# Patient Record
Sex: FEMALE | Race: OTHER | NOT HISPANIC OR LATINO | URBAN - METROPOLITAN AREA
[De-identification: names, ages, dates, MRNs, and addresses within clinical notes are randomized per-mention and may not be internally consistent; named-entity substitution may affect disease eponyms.]

---

## 2018-01-01 ENCOUNTER — OUTPATIENT (OUTPATIENT)
Dept: OUTPATIENT SERVICES | Facility: HOSPITAL | Age: 83
LOS: 1 days | End: 2018-01-01
Payer: MEDICAID

## 2018-01-01 PROCEDURE — G9001: CPT

## 2018-01-02 ENCOUNTER — INPATIENT (INPATIENT)
Facility: HOSPITAL | Age: 83
LOS: 6 days | Discharge: HOME | DRG: 202 | End: 2018-01-09
Attending: GENERAL PRACTICE | Admitting: GENERAL PRACTICE
Payer: MEDICARE

## 2018-01-02 VITALS
HEART RATE: 71 BPM | HEIGHT: 62 IN | OXYGEN SATURATION: 97 % | DIASTOLIC BLOOD PRESSURE: 77 MMHG | TEMPERATURE: 98 F | SYSTOLIC BLOOD PRESSURE: 133 MMHG | WEIGHT: 149.91 LBS | RESPIRATION RATE: 17 BRPM

## 2018-01-02 LAB
ALBUMIN SERPL ELPH-MCNC: 3.8 G/DL — SIGNIFICANT CHANGE UP (ref 3.5–5)
ALP SERPL-CCNC: 93 U/L — SIGNIFICANT CHANGE UP (ref 40–120)
ALT FLD-CCNC: 21 U/L DA — SIGNIFICANT CHANGE UP (ref 10–60)
ANION GAP SERPL CALC-SCNC: 12 MMOL/L — SIGNIFICANT CHANGE UP (ref 5–17)
APPEARANCE UR: CLEAR — SIGNIFICANT CHANGE UP
AST SERPL-CCNC: 19 U/L — SIGNIFICANT CHANGE UP (ref 10–40)
BACTERIA # UR AUTO: ABNORMAL /HPF
BASOPHILS # BLD AUTO: 0.1 K/UL — SIGNIFICANT CHANGE UP (ref 0–0.2)
BASOPHILS NFR BLD AUTO: 1 % — SIGNIFICANT CHANGE UP (ref 0–2)
BILIRUB SERPL-MCNC: 0.3 MG/DL — SIGNIFICANT CHANGE UP (ref 0.2–1.2)
BILIRUB UR-MCNC: NEGATIVE — SIGNIFICANT CHANGE UP
BUN SERPL-MCNC: 18 MG/DL — SIGNIFICANT CHANGE UP (ref 7–18)
CALCIUM SERPL-MCNC: 9.3 MG/DL — SIGNIFICANT CHANGE UP (ref 8.4–10.5)
CHLORIDE SERPL-SCNC: 100 MMOL/L — SIGNIFICANT CHANGE UP (ref 96–108)
CO2 SERPL-SCNC: 25 MMOL/L — SIGNIFICANT CHANGE UP (ref 22–31)
COLOR SPEC: SIGNIFICANT CHANGE UP
CREAT SERPL-MCNC: 0.82 MG/DL — SIGNIFICANT CHANGE UP (ref 0.5–1.3)
DIFF PNL FLD: ABNORMAL
EOSINOPHIL # BLD AUTO: 0.2 K/UL — SIGNIFICANT CHANGE UP (ref 0–0.5)
EOSINOPHIL NFR BLD AUTO: 3.3 % — SIGNIFICANT CHANGE UP (ref 0–6)
EPI CELLS # UR: ABNORMAL /HPF
GLUCOSE SERPL-MCNC: 127 MG/DL — HIGH (ref 70–99)
GLUCOSE UR QL: NEGATIVE — SIGNIFICANT CHANGE UP
HCT VFR BLD CALC: 41.3 % — SIGNIFICANT CHANGE UP (ref 34.5–45)
HGB BLD-MCNC: 12.8 G/DL — SIGNIFICANT CHANGE UP (ref 11.5–15.5)
KETONES UR-MCNC: NEGATIVE — SIGNIFICANT CHANGE UP
LACTATE SERPL-SCNC: 1.8 MMOL/L — SIGNIFICANT CHANGE UP (ref 0.7–2)
LEUKOCYTE ESTERASE UR-ACNC: ABNORMAL
LYMPHOCYTES # BLD AUTO: 2.9 K/UL — SIGNIFICANT CHANGE UP (ref 1–3.3)
LYMPHOCYTES # BLD AUTO: 40.1 % — SIGNIFICANT CHANGE UP (ref 13–44)
MCHC RBC-ENTMCNC: 26.1 PG — LOW (ref 27–34)
MCHC RBC-ENTMCNC: 31 GM/DL — LOW (ref 32–36)
MCV RBC AUTO: 84.2 FL — SIGNIFICANT CHANGE UP (ref 80–100)
MONOCYTES # BLD AUTO: 0.4 K/UL — SIGNIFICANT CHANGE UP (ref 0–0.9)
MONOCYTES NFR BLD AUTO: 5.7 % — SIGNIFICANT CHANGE UP (ref 2–14)
NEUTROPHILS # BLD AUTO: 3.6 K/UL — SIGNIFICANT CHANGE UP (ref 1.8–7.4)
NEUTROPHILS NFR BLD AUTO: 49.9 % — SIGNIFICANT CHANGE UP (ref 43–77)
NITRITE UR-MCNC: NEGATIVE — SIGNIFICANT CHANGE UP
PH UR: 7 — SIGNIFICANT CHANGE UP (ref 5–8)
PLATELET # BLD AUTO: 236 K/UL — SIGNIFICANT CHANGE UP (ref 150–400)
POTASSIUM SERPL-MCNC: 3.6 MMOL/L — SIGNIFICANT CHANGE UP (ref 3.5–5.3)
POTASSIUM SERPL-SCNC: 3.6 MMOL/L — SIGNIFICANT CHANGE UP (ref 3.5–5.3)
PROT SERPL-MCNC: 8.6 G/DL — HIGH (ref 6–8.3)
PROT UR-MCNC: NEGATIVE — SIGNIFICANT CHANGE UP
RBC # BLD: 4.91 M/UL — SIGNIFICANT CHANGE UP (ref 3.8–5.2)
RBC # FLD: 13.7 % — SIGNIFICANT CHANGE UP (ref 10.3–14.5)
RBC CASTS # UR COMP ASSIST: SIGNIFICANT CHANGE UP /HPF (ref 0–2)
SODIUM SERPL-SCNC: 137 MMOL/L — SIGNIFICANT CHANGE UP (ref 135–145)
SP GR SPEC: 1 — LOW (ref 1.01–1.02)
UROBILINOGEN FLD QL: NEGATIVE — SIGNIFICANT CHANGE UP
WBC # BLD: 7.1 K/UL — SIGNIFICANT CHANGE UP (ref 3.8–10.5)
WBC # FLD AUTO: 7.1 K/UL — SIGNIFICANT CHANGE UP (ref 3.8–10.5)
WBC UR QL: >50 /HPF (ref 0–5)

## 2018-01-02 PROCEDURE — 71045 X-RAY EXAM CHEST 1 VIEW: CPT | Mod: 26

## 2018-01-02 RX ORDER — SODIUM CHLORIDE 9 MG/ML
1000 INJECTION INTRAMUSCULAR; INTRAVENOUS; SUBCUTANEOUS ONCE
Qty: 0 | Refills: 0 | Status: COMPLETED | OUTPATIENT
Start: 2018-01-02 | End: 2018-01-02

## 2018-01-02 RX ORDER — ALBUTEROL 90 UG/1
2.5 AEROSOL, METERED ORAL ONCE
Qty: 0 | Refills: 0 | Status: COMPLETED | OUTPATIENT
Start: 2018-01-02 | End: 2018-01-02

## 2018-01-02 RX ORDER — IPRATROPIUM/ALBUTEROL SULFATE 18-103MCG
3 AEROSOL WITH ADAPTER (GRAM) INHALATION ONCE
Qty: 0 | Refills: 0 | Status: COMPLETED | OUTPATIENT
Start: 2018-01-02 | End: 2018-01-02

## 2018-01-02 RX ADMIN — ALBUTEROL 2.5 MILLIGRAM(S): 90 AEROSOL, METERED ORAL at 23:27

## 2018-01-02 RX ADMIN — SODIUM CHLORIDE 1000 MILLILITER(S): 9 INJECTION INTRAMUSCULAR; INTRAVENOUS; SUBCUTANEOUS at 22:10

## 2018-01-02 RX ADMIN — Medication 3 MILLILITER(S): at 23:27

## 2018-01-02 RX ADMIN — Medication 125 MILLIGRAM(S): at 22:10

## 2018-01-02 NOTE — ED PROVIDER NOTE - MEDICAL DECISION MAKING DETAILS
pt with cough and wheezing and feels dizzy / no focal neurological deficits   labs , cxr , hydration

## 2018-01-02 NOTE — ED PROVIDER NOTE - CARE PLAN
Principal Discharge DX:	Moderate persistent asthma with exacerbation Principal Discharge DX:	Moderate persistent asthma with exacerbation  Secondary Diagnosis:	NSTEMI (non-ST elevated myocardial infarction)

## 2018-01-02 NOTE — ED PROVIDER NOTE - PROGRESS NOTE DETAILS
patient signed out to me by Dr. Hutchinson at 10pm. labs show trop 0.37-given ASA, EKG nonischemic, proBNP 10K, CXR shows mild interstitial markings at bases. On reeval pt denies chest pain, wheezing resolved after nebs. Patient stable for admission. OTONIEL Delgadillo MD

## 2018-01-02 NOTE — ED ADULT NURSE NOTE - ED STAT RN HANDOFF DETAILS
Handover report given to Nurse Rosa. Patient resting quietly. Is being nursed on Cardiac Monitor. Awaiting bed availability.

## 2018-01-03 DIAGNOSIS — J45.41 MODERATE PERSISTENT ASTHMA WITH (ACUTE) EXACERBATION: ICD-10-CM

## 2018-01-03 DIAGNOSIS — N39.0 URINARY TRACT INFECTION, SITE NOT SPECIFIED: ICD-10-CM

## 2018-01-03 DIAGNOSIS — Z29.9 ENCOUNTER FOR PROPHYLACTIC MEASURES, UNSPECIFIED: ICD-10-CM

## 2018-01-03 DIAGNOSIS — I10 ESSENTIAL (PRIMARY) HYPERTENSION: ICD-10-CM

## 2018-01-03 LAB
CK MB BLD-MCNC: 2.3 % — SIGNIFICANT CHANGE UP (ref 0–3.5)
CK MB BLD-MCNC: 3.3 % — SIGNIFICANT CHANGE UP (ref 0–3.5)
CK MB CFR SERPL CALC: 1 NG/ML — SIGNIFICANT CHANGE UP (ref 0–3.6)
CK MB CFR SERPL CALC: 1.4 NG/ML — SIGNIFICANT CHANGE UP (ref 0–3.6)
CK SERPL-CCNC: 43 U/L — SIGNIFICANT CHANGE UP (ref 21–215)
CK SERPL-CCNC: 44 U/L — SIGNIFICANT CHANGE UP (ref 21–215)
HBA1C BLD-MCNC: 6.4 % — HIGH (ref 4–5.6)
LACTATE SERPL-SCNC: 3.3 MMOL/L — HIGH (ref 0.7–2)
LACTATE SERPL-SCNC: 3.8 MMOL/L — HIGH (ref 0.7–2)
LACTATE SERPL-SCNC: 4.6 MMOL/L — CRITICAL HIGH (ref 0.7–2)
NT-PROBNP SERPL-SCNC: 173 PG/ML — SIGNIFICANT CHANGE UP (ref 0–450)
RAPID RVP RESULT: SIGNIFICANT CHANGE UP
TROPONIN I SERPL-MCNC: <0.015 NG/ML — SIGNIFICANT CHANGE UP (ref 0–0.04)
TROPONIN I SERPL-MCNC: <0.015 NG/ML — SIGNIFICANT CHANGE UP (ref 0–0.04)

## 2018-01-03 PROCEDURE — 99285 EMERGENCY DEPT VISIT HI MDM: CPT | Mod: 25

## 2018-01-03 RX ORDER — ATORVASTATIN CALCIUM 80 MG/1
20 TABLET, FILM COATED ORAL AT BEDTIME
Qty: 0 | Refills: 0 | Status: DISCONTINUED | OUTPATIENT
Start: 2018-01-03 | End: 2018-01-09

## 2018-01-03 RX ORDER — LOSARTAN POTASSIUM 100 MG/1
25 TABLET, FILM COATED ORAL DAILY
Qty: 0 | Refills: 0 | Status: DISCONTINUED | OUTPATIENT
Start: 2018-01-03 | End: 2018-01-03

## 2018-01-03 RX ORDER — IPRATROPIUM/ALBUTEROL SULFATE 18-103MCG
3 AEROSOL WITH ADAPTER (GRAM) INHALATION EVERY 6 HOURS
Qty: 0 | Refills: 0 | Status: DISCONTINUED | OUTPATIENT
Start: 2018-01-03 | End: 2018-01-09

## 2018-01-03 RX ORDER — ASPIRIN/CALCIUM CARB/MAGNESIUM 324 MG
81 TABLET ORAL DAILY
Qty: 0 | Refills: 0 | Status: DISCONTINUED | OUTPATIENT
Start: 2018-01-03 | End: 2018-01-09

## 2018-01-03 RX ORDER — HYDROCHLOROTHIAZIDE 25 MG
25 TABLET ORAL DAILY
Qty: 0 | Refills: 0 | Status: DISCONTINUED | OUTPATIENT
Start: 2018-01-03 | End: 2018-01-03

## 2018-01-03 RX ORDER — AMLODIPINE BESYLATE 2.5 MG/1
10 TABLET ORAL DAILY
Qty: 0 | Refills: 0 | Status: DISCONTINUED | OUTPATIENT
Start: 2018-01-03 | End: 2018-01-05

## 2018-01-03 RX ORDER — SODIUM CHLORIDE 9 MG/ML
1000 INJECTION INTRAMUSCULAR; INTRAVENOUS; SUBCUTANEOUS ONCE
Qty: 0 | Refills: 0 | Status: COMPLETED | OUTPATIENT
Start: 2018-01-03 | End: 2018-01-03

## 2018-01-03 RX ORDER — ASPIRIN/CALCIUM CARB/MAGNESIUM 324 MG
325 TABLET ORAL ONCE
Qty: 0 | Refills: 0 | Status: COMPLETED | OUTPATIENT
Start: 2018-01-03 | End: 2018-01-03

## 2018-01-03 RX ORDER — SODIUM CHLORIDE 9 MG/ML
1000 INJECTION INTRAMUSCULAR; INTRAVENOUS; SUBCUTANEOUS ONCE
Qty: 0 | Refills: 0 | Status: DISCONTINUED | OUTPATIENT
Start: 2018-01-03 | End: 2018-01-03

## 2018-01-03 RX ORDER — CEFTRIAXONE 500 MG/1
1 INJECTION, POWDER, FOR SOLUTION INTRAMUSCULAR; INTRAVENOUS ONCE
Qty: 0 | Refills: 0 | Status: COMPLETED | OUTPATIENT
Start: 2018-01-03 | End: 2018-01-03

## 2018-01-03 RX ORDER — INFLUENZA VIRUS VACCINE 15; 15; 15; 15 UG/.5ML; UG/.5ML; UG/.5ML; UG/.5ML
0.5 SUSPENSION INTRAMUSCULAR ONCE
Qty: 0 | Refills: 0 | Status: COMPLETED | OUTPATIENT
Start: 2018-01-03 | End: 2018-01-08

## 2018-01-03 RX ADMIN — Medication 50 MILLIGRAM(S): at 12:47

## 2018-01-03 RX ADMIN — Medication 3 MILLILITER(S): at 16:48

## 2018-01-03 RX ADMIN — CEFTRIAXONE 100 GRAM(S): 500 INJECTION, POWDER, FOR SOLUTION INTRAMUSCULAR; INTRAVENOUS at 04:43

## 2018-01-03 RX ADMIN — Medication 325 MILLIGRAM(S): at 04:41

## 2018-01-03 RX ADMIN — Medication 3 MILLILITER(S): at 10:51

## 2018-01-03 RX ADMIN — Medication 81 MILLIGRAM(S): at 14:53

## 2018-01-03 RX ADMIN — SODIUM CHLORIDE 500 MILLILITER(S): 9 INJECTION INTRAMUSCULAR; INTRAVENOUS; SUBCUTANEOUS at 12:47

## 2018-01-03 NOTE — H&P ADULT - NSHPLABSRESULTS_GEN_ALL_CORE
Vital Signs Last 24 Hrs  T(C): 36.7 (03 Jan 2018 10:56), Max: 36.8 (02 Jan 2018 18:50)  T(F): 98.1 (03 Jan 2018 10:56), Max: 98.2 (02 Jan 2018 18:50)  HR: 80 (03 Jan 2018 10:56) (71 - 95)  BP: 128/67 (03 Jan 2018 10:56) (124/57 - 137/58)  BP(mean): --  RR: 18 (03 Jan 2018 10:56) (16 - 18)  SpO2: 100% (03 Jan 2018 10:56) (96% - 100%)

## 2018-01-03 NOTE — H&P ADULT - PROBLEM SELECTOR PLAN 4
-Continue Norvasc. Holding HCTZ and losartan secondary to elevated lactate likely secondary to dehydration.

## 2018-01-03 NOTE — H&P ADULT - PROBLEM SELECTOR PLAN 3
-Patient presented with SOB. BNP is elevated to 10, 891 on previous labs ([facility] lab error as repeat BNP is 173) but patient is not clinically fluid overload. Repeat BNP is 173. Will not started Lasix. Follow ECHO and cardio recommendation Dr astudillo. -Patient presented with SOB. BNP is elevated to 10, 891 on previous labs ([facility] lab error as repeat BNP is 173) but patient is not clinically fluid overload. Repeat BNP is 173. Will not started Lasix. T 1 was initially elevated trended down to normal. EKG: NSR at 84 bpm with no st t wave changes. Follow ECHO and cardio recommendation Dr astudillo.

## 2018-01-03 NOTE — H&P ADULT - ASSESSMENT
89 F from home walks with walker with PMH of Asthma, DM and Htn presented with shortness of breath associated with subjective fever and productive cough producing yellow phlegm.

## 2018-01-03 NOTE — ED ADULT NURSE REASSESSMENT NOTE - NS ED NURSE REASSESS COMMENT FT1
patient awake and alert, breathing not distressed. Is being nursed on cardiac monitor. Vitals charted. Patient is being admitted, awaiting bed availability.

## 2018-01-03 NOTE — H&P ADULT - PROBLEM SELECTOR PLAN 1
-Patient presented with shortness of breath associated with productive cough, fevers and chills. Patient is mildly wheezing on exam. Likely secondary to Asthma exacerbation. RVP negative.  Started Prednisone 50 for 5 days. -Patient presented with shortness of breath associated with productive cough, fevers and chills. Patient is mildly wheezing on exam. Likely secondary to Asthma exacerbation secondary to bronchitis. RVP negative. Started Prednisone 50 for 5 days. -Patient presented with shortness of breath associated with productive cough, fevers and chills. Patient is mildly wheezing on exam. Likely secondary to Asthma exacerbation secondary to Bronchopneumonia. RVP negative. Started Prednisone 50 for 5 days.

## 2018-01-03 NOTE — H&P ADULT - PROBLEM SELECTOR PLAN 2
-UA is positive for WBC >50 with moderate leucocyte esterase. Concern for sepsis. qSofa is 0/4. Lactate is elevated 4.6. S/p 1L bolus. Follow repeat lactate. Continue Levaquin.   -Follow urine cultures -UA is positive for WBC >50 with moderate leucocyte esterase.    Lactate is elevated 4.6. S/p 1L bolus. Follow repeat lactate.   Continue Levaquin.   -Follow urine cultures

## 2018-01-03 NOTE — H&P ADULT - PROBLEM SELECTOR PLAN 5
IMPROVE VTE Individual Risk Assessment          RISK                                                          Points    [  ] Previous VTE                                                3  [  ] Thrombophilia                                             2  [  ] Lower limb paralysis                                    2        (unable to hold up >15 seconds)    [  ] Current Cancer                                             2         (within 6 months)  [x] Immobilization > 24 hrs                              1  [  ] ICU/CCU stay > 24 hours                            1  [x] Age > 60                                                    1    IMPROVE VTE Score 2    -Lovenox for DVt

## 2018-01-03 NOTE — H&P ADULT - HISTORY OF PRESENT ILLNESS
89 F with pmh of Asthma, DM and Htn presented with shortness of breath associated with subjective fever and productive cough producing yellow phlegm. Patient went to PCP and was prescribed Levaquin. Patient also reports associated headache. Denies chest pain, nausea, vomiting, constipation and diarrhea.   SH: Denies smoking, denies drinking, denies illicit drug use. FH: DM in father. 89 F from home walks with walker with PMH of Asthma, DM and Htn presented with shortness of breath associated with subjective fever and productive cough producing yellow phlegm. Patient went to PCP and was prescribed Levaquin. Patient also reports associated headache. Denies chest pain, nausea, vomiting, constipation and diarrhea. Patient denies h/o CHF.   SH: Denies smoking, denies drinking, denies illicit drug use. FH: DM in father. 89 F from home walks with walker with PMH of Asthma, DM and Htn presented with shortness of breath associated with subjective fever and productive cough producing yellow phlegm. Patient went to PCP and was prescribed Levaquin. Patient also reports associated headache. Denies chest pain, nausea, vomiting, constipation and diarrhea. Patient denies h/o CHF.   SH: Denies smoking, denies drinking, denies illicit drug use. FH: DM in father.   Goals of care: Patient is DNR/DNI, dont want to get intubated even in worse case scenario. 89 F from home walks with walker with PMH of Asthma, DM and Htn presented with shortness of breath associated with subjective fever and productive cough producing yellow phlegm. Patient went to PCP and was prescribed Levaquin. Patient also reports associated headache. Denies chest pain, nausea, vomiting, constipation and diarrhea. Patient denies h/o CHF.     SH: Denies smoking, denies drinking, denies illicit drug use. FH: DM in father.   Goals of care: Patient is DNR/DNI, dont want to get intubated even in worse case scenario.

## 2018-01-04 ENCOUNTER — TRANSCRIPTION ENCOUNTER (OUTPATIENT)
Age: 83
End: 2018-01-04

## 2018-01-04 DIAGNOSIS — E11.9 TYPE 2 DIABETES MELLITUS WITHOUT COMPLICATIONS: ICD-10-CM

## 2018-01-04 LAB
24R-OH-CALCIDIOL SERPL-MCNC: 42.4 NG/ML — SIGNIFICANT CHANGE UP (ref 30–80)
ALBUMIN SERPL ELPH-MCNC: 3.3 G/DL — LOW (ref 3.5–5)
ALP SERPL-CCNC: 70 U/L — SIGNIFICANT CHANGE UP (ref 40–120)
ALT FLD-CCNC: 17 U/L DA — SIGNIFICANT CHANGE UP (ref 10–60)
ANION GAP SERPL CALC-SCNC: 10 MMOL/L — SIGNIFICANT CHANGE UP (ref 5–17)
AST SERPL-CCNC: 14 U/L — SIGNIFICANT CHANGE UP (ref 10–40)
BILIRUB SERPL-MCNC: 0.3 MG/DL — SIGNIFICANT CHANGE UP (ref 0.2–1.2)
BUN SERPL-MCNC: 24 MG/DL — HIGH (ref 7–18)
CALCIUM SERPL-MCNC: 9.3 MG/DL — SIGNIFICANT CHANGE UP (ref 8.4–10.5)
CHLORIDE SERPL-SCNC: 107 MMOL/L — SIGNIFICANT CHANGE UP (ref 96–108)
CHOLEST SERPL-MCNC: 218 MG/DL — HIGH (ref 10–199)
CO2 SERPL-SCNC: 24 MMOL/L — SIGNIFICANT CHANGE UP (ref 22–31)
CREAT SERPL-MCNC: 0.8 MG/DL — SIGNIFICANT CHANGE UP (ref 0.5–1.3)
GLUCOSE BLDC GLUCOMTR-MCNC: 128 MG/DL — HIGH (ref 70–99)
GLUCOSE BLDC GLUCOMTR-MCNC: 145 MG/DL — HIGH (ref 70–99)
GLUCOSE BLDC GLUCOMTR-MCNC: 171 MG/DL — HIGH (ref 70–99)
GLUCOSE BLDC GLUCOMTR-MCNC: 233 MG/DL — HIGH (ref 70–99)
GLUCOSE SERPL-MCNC: 141 MG/DL — HIGH (ref 70–99)
HBA1C BLD-MCNC: 6.4 % — HIGH (ref 4–5.6)
HCT VFR BLD CALC: 36.5 % — SIGNIFICANT CHANGE UP (ref 34.5–45)
HDLC SERPL-MCNC: 89 MG/DL — SIGNIFICANT CHANGE UP (ref 40–125)
HGB BLD-MCNC: 11.4 G/DL — LOW (ref 11.5–15.5)
LACTATE SERPL-SCNC: 2.4 MMOL/L — HIGH (ref 0.7–2)
LIPID PNL WITH DIRECT LDL SERPL: 115 MG/DL — SIGNIFICANT CHANGE UP
MAGNESIUM SERPL-MCNC: 2.4 MG/DL — SIGNIFICANT CHANGE UP (ref 1.6–2.6)
MCHC RBC-ENTMCNC: 25.8 PG — LOW (ref 27–34)
MCHC RBC-ENTMCNC: 31.1 GM/DL — LOW (ref 32–36)
MCV RBC AUTO: 83 FL — SIGNIFICANT CHANGE UP (ref 80–100)
PHOSPHATE SERPL-MCNC: 3.2 MG/DL — SIGNIFICANT CHANGE UP (ref 2.5–4.5)
PLATELET # BLD AUTO: 210 K/UL — SIGNIFICANT CHANGE UP (ref 150–400)
POTASSIUM SERPL-MCNC: 3.9 MMOL/L — SIGNIFICANT CHANGE UP (ref 3.5–5.3)
POTASSIUM SERPL-SCNC: 3.9 MMOL/L — SIGNIFICANT CHANGE UP (ref 3.5–5.3)
PROT SERPL-MCNC: 7.3 G/DL — SIGNIFICANT CHANGE UP (ref 6–8.3)
RBC # BLD: 4.4 M/UL — SIGNIFICANT CHANGE UP (ref 3.8–5.2)
RBC # FLD: 13.9 % — SIGNIFICANT CHANGE UP (ref 10.3–14.5)
SODIUM SERPL-SCNC: 141 MMOL/L — SIGNIFICANT CHANGE UP (ref 135–145)
TOTAL CHOLESTEROL/HDL RATIO MEASUREMENT: 2.4 RATIO — LOW (ref 3.3–7.1)
TRIGL SERPL-MCNC: 70 MG/DL — SIGNIFICANT CHANGE UP (ref 10–149)
TSH SERPL-MCNC: 0.84 UU/ML — SIGNIFICANT CHANGE UP (ref 0.34–4.82)
VIT B12 SERPL-MCNC: 1719 PG/ML — HIGH (ref 232–1245)
WBC # BLD: 9.8 K/UL — SIGNIFICANT CHANGE UP (ref 3.8–10.5)
WBC # FLD AUTO: 9.8 K/UL — SIGNIFICANT CHANGE UP (ref 3.8–10.5)

## 2018-01-04 PROCEDURE — 71045 X-RAY EXAM CHEST 1 VIEW: CPT | Mod: 26

## 2018-01-04 PROCEDURE — 71250 CT THORAX DX C-: CPT | Mod: 26

## 2018-01-04 RX ORDER — INSULIN LISPRO 100/ML
VIAL (ML) SUBCUTANEOUS
Qty: 0 | Refills: 0 | Status: DISCONTINUED | OUTPATIENT
Start: 2018-01-04 | End: 2018-01-09

## 2018-01-04 RX ORDER — DOCUSATE SODIUM 100 MG
100 CAPSULE ORAL DAILY
Qty: 0 | Refills: 0 | Status: DISCONTINUED | OUTPATIENT
Start: 2018-01-04 | End: 2018-01-09

## 2018-01-04 RX ORDER — ENOXAPARIN SODIUM 100 MG/ML
40 INJECTION SUBCUTANEOUS DAILY
Qty: 0 | Refills: 0 | Status: DISCONTINUED | OUTPATIENT
Start: 2018-01-04 | End: 2018-01-09

## 2018-01-04 RX ORDER — SENNA PLUS 8.6 MG/1
2 TABLET ORAL AT BEDTIME
Qty: 0 | Refills: 0 | Status: DISCONTINUED | OUTPATIENT
Start: 2018-01-04 | End: 2018-01-09

## 2018-01-04 RX ORDER — ZOLPIDEM TARTRATE 10 MG/1
5 TABLET ORAL AT BEDTIME
Qty: 0 | Refills: 0 | Status: DISCONTINUED | OUTPATIENT
Start: 2018-01-04 | End: 2018-01-09

## 2018-01-04 RX ADMIN — ATORVASTATIN CALCIUM 20 MILLIGRAM(S): 80 TABLET, FILM COATED ORAL at 01:17

## 2018-01-04 RX ADMIN — ENOXAPARIN SODIUM 40 MILLIGRAM(S): 100 INJECTION SUBCUTANEOUS at 12:31

## 2018-01-04 RX ADMIN — Medication 3 MILLILITER(S): at 15:07

## 2018-01-04 RX ADMIN — Medication 81 MILLIGRAM(S): at 12:32

## 2018-01-04 RX ADMIN — AMLODIPINE BESYLATE 10 MILLIGRAM(S): 2.5 TABLET ORAL at 05:38

## 2018-01-04 RX ADMIN — Medication 100 MILLIGRAM(S): at 18:35

## 2018-01-04 RX ADMIN — Medication 50 MILLIGRAM(S): at 05:39

## 2018-01-04 RX ADMIN — Medication 2: at 12:32

## 2018-01-04 RX ADMIN — SENNA PLUS 2 TABLET(S): 8.6 TABLET ORAL at 21:49

## 2018-01-04 RX ADMIN — Medication 20 MILLIGRAM(S): at 16:53

## 2018-01-04 RX ADMIN — ZOLPIDEM TARTRATE 5 MILLIGRAM(S): 10 TABLET ORAL at 21:49

## 2018-01-04 RX ADMIN — Medication 1: at 21:49

## 2018-01-04 NOTE — PROGRESS NOTE ADULT - PROBLEM SELECTOR PLAN 2
UA positive  Lactate still elevated s/p 3L bolus... given h/o CHF... will monitor clinically  c/w PO levaquin  f/u UCx

## 2018-01-04 NOTE — PROGRESS NOTE ADULT - PROBLEM SELECTOR PLAN 1
Likely secondary to Asthma exacerbation secondary to Bronchopneumonia.   RVP negative.   c/w PO Pred 50mg for 5 days.  f/u CT Chest w/o Cont - abnormal CXR

## 2018-01-04 NOTE — DISCHARGE NOTE ADULT - MEDICATION SUMMARY - MEDICATIONS TO STOP TAKING
I will STOP taking the medications listed below when I get home from the hospital:    hydroCHLOROthiazide 25 mg oral tablet  -- 1 tab(s) by mouth once a day    Norvasc 10 mg oral tablet  -- 1 tab(s) by mouth once a day    Levaquin 500 mg oral tablet  -- 1 tab(s) by mouth every 24 hours

## 2018-01-04 NOTE — DISCHARGE NOTE ADULT - NS AS DC HF EDUCATION INSTRUCTIONS
Low salt diet/Call Primary Care Provider for follow-up after discharge/Activities as tolerated/Monitor Weight Daily/Report weight gain of 2 or more pounds in one day or 3 or more pounds in one week, worsening shortness of breath, fatigue, weakness, increased swelling of hands and feet to primary care provider

## 2018-01-04 NOTE — DISCHARGE NOTE ADULT - CARE PLAN
Principal Discharge DX:	Moderate persistent asthma with exacerbation  Secondary Diagnosis:	UTI (urinary tract infection)  Secondary Diagnosis:	CHF (congestive heart failure)  Secondary Diagnosis:	Hypertension Principal Discharge DX:	Moderate persistent asthma with exacerbation  Goal:	avoid future episode of asthma exacerbation  Instructions for follow-up, activity and diet:	You were seen and examined for your asthma exacerbation. You have clinically improved. You finished a course of antibiotics and on a tapering dose of prednisone.     PLEASE CONTINUE PRESCRIBED MEDS, AND FOLLOW UP WITH PCP IN 5 DAYS.  Secondary Diagnosis:	UTI (urinary tract infection)  Instructions for follow-up, activity and diet:	Finished antibiotics course  Secondary Diagnosis:	CHF (congestive heart failure)  Instructions for follow-up, activity and diet:	Cardiology Dr. Patel consulted. Echocardiogram Ejection Fraction 55%  Nuclear stress testing normal.  Secondary Diagnosis:	Hypertension  Goal:	BP< 150/90  Instructions for follow-up, activity and diet:	Stable. Continue prescribed meds Principal Discharge DX:	Moderate persistent asthma with exacerbation  Goal:	avoid future episode of asthma exacerbation  Assessment and plan of treatment:	You were seen and examined for your asthma exacerbation. You have clinically improved. You finished a course of antibiotics and on a tapering dose of prednisone.     PLEASE CONTINUE PRESCRIBED MEDS, AND FOLLOW UP WITH PCP IN 5 DAYS.  Secondary Diagnosis:	UTI (urinary tract infection)  Assessment and plan of treatment:	Finished antibiotics course  Secondary Diagnosis:	CHF (congestive heart failure)  Assessment and plan of treatment:	Cardiology Dr. Patel consulted. Echocardiogram Ejection Fraction 55%  Nuclear stress testing normal.  Secondary Diagnosis:	Hypertension  Goal:	BP< 150/90  Assessment and plan of treatment:	Stable. Continue prescribed meds

## 2018-01-04 NOTE — DISCHARGE NOTE ADULT - CARE PROVIDER_API CALL
Tony Chavez (), Internal Medicine  [address] [address]  Phone: [phone]  Fax: [phone]    Sully Patel), Cardiology; Internal Medicine  [address] [address]  Phone: [phone]  Fax: [phone]

## 2018-01-04 NOTE — DISCHARGE NOTE ADULT - MEDICATION SUMMARY - MEDICATIONS TO TAKE
I will START or STAY ON the medications listed below when I get home from the hospital:    predniSONE 5 mg oral tablet  -- 1 tab(s) by mouth once a day   -- It is very important that you take or use this exactly as directed.  Do not skip doses or discontinue unless directed by your doctor.  Obtain medical advice before taking any non-prescription drugs as some may affect the action of this medication.  Take with food or milk.    -- Indication: For Moderate persistent asthma with acute exacerbation    aspirin 81 mg oral tablet, chewable  -- 1 tab(s) by mouth once a day  -- Indication: For Cardioprotective    losartan 25 mg oral tablet  -- 1 tab(s) by mouth once a day  -- Indication: For HTN (hypertension)    metFORMIN 500 mg oral tablet, extended release  -- 1 tab(s) by mouth once a day  -- Indication: For DM (diabetes mellitus)    atorvastatin 20 mg oral tablet  -- 1 tab(s) by mouth once a day (at bedtime)  -- Indication: For Cardioprotective

## 2018-01-04 NOTE — DISCHARGE NOTE ADULT - HOSPITAL COURSE
89 F from home walks with walker with PMH of Asthma, DM and Htn presented with shortness of breath associated with subjective fever and productive cough producing yellow phlegm. Patient went to PCP and was prescribed Levaquin. Patient also reports associated headache. Denies chest pain, nausea, vomiting, constipation and diarrhea. Patient denies h/o CHF.     SH: Denies smoking, denies drinking, denies illicit drug use. FH: DM in father.   Goals of care: Patient is DNR/DNI, dont want to get intubated even in worse case scenario.         Pt is now medically stable for discharge, continue prescribed meds, and follow up with PCP in 5 days for eval. 89 F from home walks with walker with PMH of Asthma, DM and Htn presented with shortness of breath associated with subjective fever and productive cough producing yellow phlegm. Patient went to PCP and was prescribed Levaquin. Patient also reports associated headache. Denies chest pain, nausea, vomiting, constipation and diarrhea. Patient denies h/o CHF.     SH: Denies smoking, denies drinking, denies illicit drug use. FH: DM in father.   Goals of care: Patient is DNR/DNI, dont want to get intubated even in worse case scenario.     Pt has clinically improved. Pt finished a course of antibiotics and on a tapering dose of prednisone.   UTI treated with levaquin.  CHF - Cardiology Dr. Patel consulted. Echocardiogram Ejection Fraction 55%  Nuclear stress testing normal.  HTN - medically managed and dose adjusted as clinically indicated    Pt is now medically stable for discharge, continue prescribed meds, and follow up with PCP in 5 days for eval.

## 2018-01-04 NOTE — PHYSICAL THERAPY INITIAL EVALUATION ADULT - CRITERIA FOR SKILLED THERAPEUTIC INTERVENTIONS
anticipated discharge recommendation/therapy frequency/predicted duration of therapy intervention/impairments found

## 2018-01-04 NOTE — CONSULT NOTE ADULT - SUBJECTIVE AND OBJECTIVE BOX
CHIEF COMPLAINT:Patient is a 89y old  Female who presents with a chief complaint of Shortness of breath with fever and cough.       HPI:89 yr old  Female  from home walks with walker with PMH of Asthma, DM and HTN presented with shortness of breath associated with subjective fever and productive cough producing yellow phlegm. Patient went to PCP and was prescribed Levaquin. Patient also reports associated headache. Denies chest pain, nausea, vomiting, constipation and diarrhea. Patient denies h/o CHF.         PAST MEDICAL & SURGICAL HISTORY:  DM (diabetes mellitus)  HTN (hypertension)  Asthma      MEDICATIONS  (STANDING):  ALBUTerol/ipratropium for Nebulization 3 milliLiter(s) Nebulizer every 6 hours  amLODIPine   Tablet 10 milliGRAM(s) Oral daily  aspirin  chewable 81 milliGRAM(s) Oral daily  atorvastatin 20 milliGRAM(s) Oral at bedtime  influenza   Vaccine 0.5 milliLiter(s) IntraMuscular once  levoFLOXacin  Tablet 500 milliGRAM(s) Oral every 24 hours  predniSONE   Tablet 50 milliGRAM(s) Oral daily    MEDICATIONS  (PRN):      FAMILY HISTORY: DM in father.       SOCIAL HISTORY:    [X ] Non-smoker  [X ] Alcohol    Allergies    No Known Allergies    Intolerances    	    REVIEW OF SYSTEMS:  CONSTITUTIONAL: + fever, No weight loss, or fatigue  EYES: No eye pain, visual disturbances, or discharge  ENT:  No difficulty hearing, tinnitus, vertigo; No sinus or throat pain  NECK: No pain or stiffness  RESPIRATORY: + cough,+ wheezing, No chills or hemoptysis; + Shortness of Breath  CARDIOVASCULAR: No chest pain, palpitations, passing out, dizziness, or leg swelling  GASTROINTESTINAL: No abdominal or epigastric pain. No nausea, vomiting, or hematemesis; No diarrhea or constipation. No melena or hematochezia.  GENITOURINARY: No dysuria, frequency, hematuria, or incontinence  NEUROLOGICAL: No headaches, memory loss, loss of strength, numbness, or tremors  SKIN: No itching, burning, rashes, or lesions   LYMPH Nodes: No enlarged glands  ENDOCRINE: No heat or cold intolerance; No hair loss  MUSCULOSKELETAL: No joint pain or swelling; No muscle, back, or extremity pain  PSYCHIATRIC: No depression, anxiety, mood swings, or difficulty sleeping  HEME/LYMPH: No easy bruising, or bleeding gums  ALLERGY AND IMMUNOLOGIC: No hives or eczema	      PHYSICAL EXAM:  T(C): 36.6 (01-04-18 @ 07:54), Max: 37.1 (01-04-18 @ 05:28)  HR: 70 (01-04-18 @ 07:54) (58 - 95)  BP: 138/51 (01-04-18 @ 07:54) (126/43 - 154/67)  RR: 18 (01-04-18 @ 07:54) (16 - 19)  SpO2: 98% (01-04-18 @ 07:54) (95% - 100%)    Appearance: Normal	  HEENT:   Normal oral mucosa, PERRL, EOMI	  Lymphatic: No lymphadenopathy  Cardiovascular: Normal S1 S2, No JVD, No murmurs, No edema  Respiratory: B/L ronchi	  Psychiatry: A & O x 3, Mood & affect appropriate  Gastrointestinal:  Soft, Non-tender, + BS	  Skin: No rashes, No ecchymoses, No cyanosis	  Neurologic: Non-focal  Extremities: Normal range of motion, No clubbing, cyanosis or edema  Vascular: Peripheral pulses palpable 2+ bilaterally     	    ECG:  	NSR ,first degree av block,LAD,Poor R wave progression    	  LABS:	 	    CARDIAC MARKERS:  CARDIAC MARKERS ( 03 Jan 2018 10:37 )  <0.015 ng/mL / x     / 43 U/L / x     / 1.4 ng/mL  CARDIAC MARKERS ( 03 Jan 2018 05:01 )  <0.015 ng/mL / x     / 44 U/L / x     / 1.0 ng/mL  CARDIAC MARKERS ( 02 Jan 2018 23:11 )  0.371 ng/mL / x     / x     / x     / x                             12.8   7.1   )-----------( 236      ( 02 Jan 2018 23:11 )             41.3     01-02    137  |  100  |  18  ----------------------------<  127<H>  3.6   |  25  |  0.82    Ca    9.3      02 Jan 2018 23:11    TPro  8.6<H>  /  Alb  3.8  /  TBili  0.3  /  DBili  x   /  AST  19  /  ALT  21  /  AlkPhos  93  01-02    proBNP: Serum Pro-Brain Natriuretic Peptide: 173 pg/mL (01-03 @ 10:37)      HgA1c: Hemoglobin A1C, Whole Blood: 6.4 % (01-03 @ 09:46)        EXAM:  XR CHEST PORTABLE IMMED 1V                            PROCEDURE DATE:  01/02/2018          INTERPRETATION:  CLINICAL STATEMENT: Chest Pain.    TECHNIQUE: AP view of the chest.    COMPARISON: None available.    FINDINGS/  IMPRESSION:  Study limited due to rotation.    Nonspecific density retrocardiac region. Possibly aortic shadow, airspace   consolidation not excluded. PA lateral chest x-ray recommended.    No pleural effusion    Heart size cannot be accurately assessed in this projection.      RVP-.

## 2018-01-04 NOTE — CONSULT NOTE ADULT - ASSESSMENT
89 yr old  Female  from home walks with walker with PMH of Asthma, DM and HTN presented with shortness of breath associated with subjective fever and productive cough producing yellow phlegm, abnormal EKG, borderline troponins.  1.Tele monitoring.  2.CT chest w/o contrast.  3.Echocardiogram.  4.Aasthma-ABX and steroids.  5.DM-insulin.  6.GI and DVT prophylaxis.

## 2018-01-04 NOTE — PROGRESS NOTE ADULT - SUBJECTIVE AND OBJECTIVE BOX
_________________________________________________________________________________________  ========>>  M E D I C A L   A T T E N D I N G    F O L L O W  U P  N O T E  <<=========  -----------------------------------------------------------------------------------------------------    - Patient seen and examined by me earlier today.  - In summary, patient is a 89y year old woman who originally presented with SOB.  - Patient today overall doing ok, comfortable, eating OK. overall feels better, less cough, + c/o "heavy head" at times    ==================>> REVIEW OF SYSTEM <<=================    GEN: no fever, no chills, no pain  RESP: no SOB, + cough, no sputum  CVS: no chest pain, no palpitations, no edema  GI: no abdominal pain, no nausea, no constipation, no diarrhea  : no dysuria, no frequency, no hematuria  Neuro: ? headache at times, no dizziness  Derm : no itching, no rash    ==================>> PHYSICAL EXAM <<=================    GEN: A&O X 3 , NAD , comfortable, appears younger than stated age  HEENT: NCAT, PERRL, MMM, hearing intact  Neck: supple , no JVD  CVS: S1S2 , regular , No M/R/G appreciated  PULM: CTA B/L,  no W/R/R appreciated  ABD.: soft. non tender, non distended,  bowel sounds present  Extrem: intact pulses , no edema   PSYCH : normal mood,  not anxious      ==================>> MEDICATIONS <<====================    MEDICATIONS  (STANDING):  ALBUTerol/ipratropium for Nebulization 3 milliLiter(s) Nebulizer every 6 hours  amLODIPine   Tablet 10 milliGRAM(s) Oral daily  aspirin  chewable 81 milliGRAM(s) Oral daily  atorvastatin 20 milliGRAM(s) Oral at bedtime  enoxaparin Injectable 40 milliGRAM(s) SubCutaneous daily  influenza   Vaccine 0.5 milliLiter(s) IntraMuscular once  insulin lispro (HumaLOG) corrective regimen sliding scale   SubCutaneous Before meals and at bedtime  levoFLOXacin  Tablet 500 milliGRAM(s) Oral every 24 hours  predniSONE   Tablet 50 milliGRAM(s) Oral daily    ==================>> VITAL SIGNS <<==================    T(C): 37.2 (18 @ 11:23), Max: 37.2 (18 @ 11:23)  HR: 72 (18 @ 11:25) (58 - 95)  BP: 133/58 (18 @ 11:25) (126/43 - 154/67)  RR: 18 (18 @ 11:23) (16 - 19)  SpO2: 99% (18 @ 11:25) (95% - 100%)    POCT Blood Glucose.: 233 mg/dL (2018 11:52)  POCT Blood Glucose.: 145 mg/dL (2018 07:54)     ==================>> LAB AND IMAGING <<==================                        11.4   9.8   )-----------( 210      ( 2018 07:53 )             36.5            141  |  107  |  24<H>  ----------------------------<  141<H>  3.9   |  24  |  0.80    Ca    9.3      2018 07:53  Phos  3.2       Mg     2.4         TPro  7.3  /  Alb  3.3<L>  /  TBili  0.3  /  DBili  x   /  AST  14  /  ALT  17  /  AlkPhos  70      CARDIAC MARKERS ( 2018 10:37 )  <0.015 ng/mL / x     / 43 U/L / x     / 1.4 ng/mL  CARDIAC MARKERS ( 2018 05:01 )  <0.015 ng/mL / x     / 44 U/L / x     / 1.0 ng/mL  CARDIAC MARKERS ( 2018 23:11 )  0.371 ng/mL / x     / x     / x     / x        Urinalysis Basic - ( 2018 23:11 )  Color: Pale Yellow / Appearance: Clear / S.005 / pH: x  Gluc: x / Ketone: Negative  / Bili: Negative / Urobili: Negative   Blood: x / Protein: Negative / Nitrite: Negative   Leuk Esterase: Moderate / RBC: 0-2 /HPF / WBC >50 /HPF   Sq Epi: x / Non Sq Epi: Occasional /HPF / Bacteria: Many /HPF    TSH:      0.84   (18)           Lipid profile:  (18)     Total: 218     LDL  : 115     HDL  :89     TG   :70     HgA1C: 6.4  (18)        , 6.4  (18)            < from: CT Chest No Cont (18 @ 13:37) >  IMPRESSION:  No evidence of pneumonia.  < end of copied text >    < from: Transthoracic Echocardiogram (18 @ 07:25) >  CONCLUSIONS:  1. Trace mitral regurgitation.  2. Normal left ventricular internal dimensions and wall  thicknesses.  3. Endocardium not well visualized; grossly normal left  ventricular systolic function. Segmental wall motion could  not be assessed.  4. Normal right ventricular size and function.  < end of copied text >    _______________________  C U L T U R E S :    Culture - Urine (collected 2018 11:08)  Source: .Urine Clean Catch (Midstream)  Preliminary Report (2018 07:34):    Insufficient growth-culture reincubated    Culture - Blood (collected 2018 10:55)  Source: .Blood Blood-Peripheral  Preliminary Report (2018 11:01):    No growth to date.    Culture - Blood (collected 2018 10:55)  Source: .Blood Blood-Peripheral  Preliminary Report (2018 11:01):    No growth to date.    ___________________________________________________________________________________  ===============>>  A S S E S S M E N T   A N D   P L A N <<===============  ------------------------------------------------------------------------------------------    · Assessment		  89 F from home walks with walker with PMH of Asthma, DM and Htn presented with shortness of breath associated with subjective fever and productive cough producing yellow phlegm.    Problem/Plan - 1:  ·  Problem: Moderate persistent asthma with mild exacerbation, possibly due to bronchitis, as no pneumonia on CT scan  RVP negative.   taper Prednisone as ordered  limit abx to 5 day    Problem/Plan - 2:  ·  Problem: UTI   -Continue Levaquin.   -Follow urine cultures.  Problem/Plan - 3:  ·  Problem:  NO CHF on echo  BNP elevatoion [facility] lab error   cardio appreciated   monitor    Problem/Plan - 4:  ·  Problem: Hypertension  -Continue meds as above and monitor   - cardio appreciated    -GI/DVT Prophylaxis.  - Possible discharge planing home in AM on oral abx as able  --------------------------------------------  Case discussed with pt, RN, NP  Education given on plam of care  ___________________________  H. A[address] Chavez.  Pager: [phone] _________________________________________________________________________________________  ========>>  M E D I C A L   A T T E N D I N G    F O L L O W  U P  N O T E  <<=========  -----------------------------------------------------------------------------------------------------    - Patient seen and examined by me earlier today.  - In summary, patient is a 89y year old woman who originally presented with SOB.  - Patient today overall doing ok, comfortable, eating OK. overall feels better, less cough, + c/o "heavy head" at times    ==================>> REVIEW OF SYSTEM <<=================    GEN: no fever, no chills, no pain  RESP: no SOB, + cough, no sputum  CVS: no chest pain, no palpitations, no edema  GI: no abdominal pain, no nausea, no constipation, no diarrhea  : no dysuria, no frequency, no hematuria  Neuro: ? headache at times, no dizziness  Derm : no itching, no rash    ==================>> PHYSICAL EXAM <<=================    GEN: A&O X 3 , NAD , comfortable, appears younger than stated age  HEENT: NCAT, PERRL, MMM, hearing intact  Neck: supple , no JVD  CVS: S1S2 , regular , No M/R/G appreciated  PULM: CTA B/L,  no W/R/R appreciated  ABD.: soft. non tender, non distended,  bowel sounds present  Extrem: intact pulses , no edema   PSYCH : normal mood,  not anxious      ==================>> MEDICATIONS <<====================    MEDICATIONS  (STANDING):  ALBUTerol/ipratropium for Nebulization 3 milliLiter(s) Nebulizer every 6 hours  amLODIPine   Tablet 10 milliGRAM(s) Oral daily  aspirin  chewable 81 milliGRAM(s) Oral daily  atorvastatin 20 milliGRAM(s) Oral at bedtime  enoxaparin Injectable 40 milliGRAM(s) SubCutaneous daily  influenza   Vaccine 0.5 milliLiter(s) IntraMuscular once  insulin lispro (HumaLOG) corrective regimen sliding scale   SubCutaneous Before meals and at bedtime  levoFLOXacin  Tablet 500 milliGRAM(s) Oral every 24 hours  predniSONE   Tablet 50 milliGRAM(s) Oral daily    ==================>> VITAL SIGNS <<==================    T(C): 37.2 (18 @ 11:23), Max: 37.2 (18 @ 11:23)  HR: 72 (18 @ 11:25) (58 - 95)  BP: 133/58 (18 @ 11:25) (126/43 - 154/67)  RR: 18 (18 @ 11:23) (16 - 19)  SpO2: 99% (18 @ 11:25) (95% - 100%)    POCT Blood Glucose.: 233 mg/dL (2018 11:52)  POCT Blood Glucose.: 145 mg/dL (2018 07:54)     ==================>> LAB AND IMAGING <<==================                        11.4   9.8   )-----------( 210      ( 2018 07:53 )             36.5            141  |  107  |  24<H>  ----------------------------<  141<H>  3.9   |  24  |  0.80    Ca    9.3      2018 07:53  Phos  3.2       Mg     2.4         TPro  7.3  /  Alb  3.3<L>  /  TBili  0.3  /  DBili  x   /  AST  14  /  ALT  17  /  AlkPhos  70      CARDIAC MARKERS ( 2018 10:37 )  <0.015 ng/mL / x     / 43 U/L / x     / 1.4 ng/mL  CARDIAC MARKERS ( 2018 05:01 )  <0.015 ng/mL / x     / 44 U/L / x     / 1.0 ng/mL  CARDIAC MARKERS ( 2018 23:11 )  0.371 ng/mL / x     / x     / x     / x        Urinalysis Basic - ( 2018 23:11 )  Color: Pale Yellow / Appearance: Clear / S.005 / pH: x  Gluc: x / Ketone: Negative  / Bili: Negative / Urobili: Negative   Blood: x / Protein: Negative / Nitrite: Negative   Leuk Esterase: Moderate / RBC: 0-2 /HPF / WBC >50 /HPF   Sq Epi: x / Non Sq Epi: Occasional /HPF / Bacteria: Many /HPF    TSH:      0.84   (18)           Lipid profile:  (18)     Total: 218     LDL  : 115     HDL  :89     TG   :70     HgA1C: 6.4  (18)        , 6.4  (18)            < from: CT Chest No Cont (18 @ 13:37) >  IMPRESSION:  No evidence of pneumonia.  < end of copied text >    < from: Transthoracic Echocardiogram (18 @ 07:25) >  CONCLUSIONS:  1. Trace mitral regurgitation.  2. Normal left ventricular internal dimensions and wall  thicknesses.  3. Endocardium not well visualized; grossly normal left  ventricular systolic function. Segmental wall motion could  not be assessed.  4. Normal right ventricular size and function.  < end of copied text >    _______________________  C U L T U R E S :    Culture - Urine (collected 2018 11:08)  Source: .Urine Clean Catch (Midstream)  Preliminary Report (2018 07:34):    Insufficient growth-culture reincubated    Culture - Blood (collected 2018 10:55)  Source: .Blood Blood-Peripheral  Preliminary Report (2018 11:01):    No growth to date.    Culture - Blood (collected 2018 10:55)  Source: .Blood Blood-Peripheral  Preliminary Report (2018 11:01):    No growth to date.    ___________________________________________________________________________________  ===============>>  A S S E S S M E N T   A N D   P L A N <<===============  ------------------------------------------------------------------------------------------    · Assessment		  89 F from home walks with walker with PMH of Asthma, DM and Htn presented with shortness of breath associated with subjective fever and productive cough producing yellow phlegm.    Problem/Plan - 1:  ·  Problem: Moderate persistent asthma with mild exacerbation, possibly due to bronchitis, as no pneumonia on CT scan  RVP negative.   taper Prednisone as ordered  limit abx to 5 day    Problem/Plan - 2:  ·  Problem: UTI   -Continue Levaquin.   -Follow urine cultures.  Problem/Plan - 3:  ·  Problem:  NO CHF on echo  BNP elevatoion [facility] lab error   cardio appreciated   monitor    Problem/Plan - 4:  ·  Problem: Hypertension  -Continue meds as above and monitor   - cardio appreciated    -GI/DVT Prophylaxis.  - Possible discharge planing home Monday ? (has HHA which needs to be set up)   --------------------------------------------	  Case discussed with pt, RN, NP  Education given on plam of care  ___________________________  H. AVE Chavez.  Pager: [phone]

## 2018-01-04 NOTE — PROGRESS NOTE ADULT - SUBJECTIVE AND OBJECTIVE BOX
PGY 1 Note discussed with supervising resident and primary attending    Patient is a 89y old  Female who presents with a chief complaint of Shortness of breath with fever and cough. (2018 11:03)      INTERVAL HPI/OVERNIGHT EVENTS: Pt was seen and examined at bedside. No acute events overnight. Pt offers no new complaints; current symptoms resolving. Pt's breathing is much better today - all questions/concerns addressed.     MEDICATIONS  (STANDING):  ALBUTerol/ipratropium for Nebulization 3 milliLiter(s) Nebulizer every 6 hours  amLODIPine   Tablet 10 milliGRAM(s) Oral daily  aspirin  chewable 81 milliGRAM(s) Oral daily  atorvastatin 20 milliGRAM(s) Oral at bedtime  enoxaparin Injectable 40 milliGRAM(s) SubCutaneous daily  influenza   Vaccine 0.5 milliLiter(s) IntraMuscular once  insulin lispro (HumaLOG) corrective regimen sliding scale   SubCutaneous Before meals and at bedtime  levoFLOXacin  Tablet 500 milliGRAM(s) Oral every 24 hours  predniSONE   Tablet 50 milliGRAM(s) Oral daily    MEDICATIONS  (PRN):      __________________________________________________  REVIEW OF SYSTEMS:    CONSTITUTIONAL: No fever, chills, night sweats  RESPIRATORY: No cough; No shortness of breath  CARDIOVASCULAR: No chest pain, no palpitations  GASTROINTESTINAL: No pain. No nausea or vomiting; No diarrhea        Vital Signs Last 24 Hrs  T(C): 37.2 (2018 11:23), Max: 37.2 (2018 11:23)  T(F): 99 (2018 11:23), Max: 99 (2018 11:23)  HR: 72 (2018 11:25) (58 - 95)  BP: 133/58 (2018 11:25) (126/43 - 154/67)  BP(mean): --  RR: 18 (2018 11:23) (16 - 19)  SpO2: 99% (2018 11:25) (95% - 100%)    ________________________________________________  PHYSICAL EXAM:  GENERAL: NAD. Well appearing, well nourished. Normal mood & affect.   HEENT: Normocephalic;  conjunctivae and sclerae clear; moist mucous membranes;   NECK : supple  CHEST/LUNG: Clear to auscultation bilaterally with good air entry   HEART: S1 S2  regular; no murmurs, gallops or rubs  ABDOMEN: Soft, Nontender, Nondistended; Bowel sounds present  EXTREMITIES: no cyanosis; no edema; no calf tenderness; peripheral pulses intact  SKIN: warm and dry; no rash  NERVOUS SYSTEM:  Awake and alert; Oriented  to place, person and time ; no new deficits    _________________________________________________  LABS:                        11.4   9.8   )-----------( 210      ( 2018 07:53 )             36.5     -    141  |  107  |  24<H>  ----------------------------<  141<H>  3.9   |  24  |  0.80    Ca    9.3      2018 07:53  Phos  3.2     -  Mg     2.4     -    TPro  7.3  /  Alb  3.3<L>  /  TBili  0.3  /  DBili  x   /  AST  14  /  ALT  17  /  AlkPhos  70  -      Urinalysis Basic - ( 2018 23:11 )    Color: Pale Yellow / Appearance: Clear / S.005 / pH: x  Gluc: x / Ketone: Negative  / Bili: Negative / Urobili: Negative   Blood: x / Protein: Negative / Nitrite: Negative   Leuk Esterase: Moderate / RBC: 0-2 /HPF / WBC >50 /HPF   Sq Epi: x / Non Sq Epi: Occasional /HPF / Bacteria: Many /HPF      CAPILLARY BLOOD GLUCOSE      POCT Blood Glucose.: 145 mg/dL (2018 07:54)      Consultant(s) Notes Reviewed:   YES    Care Discussed with PGY-2/Attending/Consultants :  YES    Plan of care was discussed with patient and /or primary care giver; all questions and concerns were addressed and care was aligned with patient's wishes.

## 2018-01-04 NOTE — DISCHARGE NOTE ADULT - PATIENT PORTAL LINK FT
“You can access the [employer] Patient Portal, offered by [facility], by registering with the following website: [email or web address]”

## 2018-01-04 NOTE — PHYSICAL THERAPY INITIAL EVALUATION ADULT - LIVES WITH, PROFILE
with handicapprd daughter. Has HHA 4 [id number]. Lives in first floor with 4 steps to get in with bilateral railings.

## 2018-01-04 NOTE — PHYSICAL THERAPY INITIAL EVALUATION ADULT - ADDITIONAL COMMENTS
patient uses [facility].  Patient stats that she only goes out for doctors appt and needs assist with stairs

## 2018-01-05 LAB
ANION GAP SERPL CALC-SCNC: 8 MMOL/L — SIGNIFICANT CHANGE UP (ref 5–17)
BUN SERPL-MCNC: 29 MG/DL — HIGH (ref 7–18)
CALCIUM SERPL-MCNC: 9.2 MG/DL — SIGNIFICANT CHANGE UP (ref 8.4–10.5)
CHLORIDE SERPL-SCNC: 105 MMOL/L — SIGNIFICANT CHANGE UP (ref 96–108)
CO2 SERPL-SCNC: 25 MMOL/L — SIGNIFICANT CHANGE UP (ref 22–31)
CREAT SERPL-MCNC: 0.96 MG/DL — SIGNIFICANT CHANGE UP (ref 0.5–1.3)
CULTURE RESULTS: SIGNIFICANT CHANGE UP
GLUCOSE BLDC GLUCOMTR-MCNC: 118 MG/DL — HIGH (ref 70–99)
GLUCOSE BLDC GLUCOMTR-MCNC: 144 MG/DL — HIGH (ref 70–99)
GLUCOSE BLDC GLUCOMTR-MCNC: 220 MG/DL — HIGH (ref 70–99)
GLUCOSE BLDC GLUCOMTR-MCNC: 94 MG/DL — SIGNIFICANT CHANGE UP (ref 70–99)
GLUCOSE SERPL-MCNC: 132 MG/DL — HIGH (ref 70–99)
HCT VFR BLD CALC: 38.1 % — SIGNIFICANT CHANGE UP (ref 34.5–45)
HGB BLD-MCNC: 12.1 G/DL — SIGNIFICANT CHANGE UP (ref 11.5–15.5)
MAGNESIUM SERPL-MCNC: 2.3 MG/DL — SIGNIFICANT CHANGE UP (ref 1.6–2.6)
MCHC RBC-ENTMCNC: 26.5 PG — LOW (ref 27–34)
MCHC RBC-ENTMCNC: 31.7 GM/DL — LOW (ref 32–36)
MCV RBC AUTO: 83.7 FL — SIGNIFICANT CHANGE UP (ref 80–100)
PHOSPHATE SERPL-MCNC: 3.7 MG/DL — SIGNIFICANT CHANGE UP (ref 2.5–4.5)
PLATELET # BLD AUTO: 244 K/UL — SIGNIFICANT CHANGE UP (ref 150–400)
POTASSIUM SERPL-MCNC: 3.8 MMOL/L — SIGNIFICANT CHANGE UP (ref 3.5–5.3)
POTASSIUM SERPL-SCNC: 3.8 MMOL/L — SIGNIFICANT CHANGE UP (ref 3.5–5.3)
RBC # BLD: 4.55 M/UL — SIGNIFICANT CHANGE UP (ref 3.8–5.2)
RBC # FLD: 13.9 % — SIGNIFICANT CHANGE UP (ref 10.3–14.5)
SODIUM SERPL-SCNC: 138 MMOL/L — SIGNIFICANT CHANGE UP (ref 135–145)
SPECIMEN SOURCE: SIGNIFICANT CHANGE UP
WBC # BLD: 9.8 K/UL — SIGNIFICANT CHANGE UP (ref 3.8–10.5)
WBC # FLD AUTO: 9.8 K/UL — SIGNIFICANT CHANGE UP (ref 3.8–10.5)

## 2018-01-05 RX ORDER — MAGNESIUM HYDROXIDE 400 MG/1
30 TABLET, CHEWABLE ORAL DAILY
Qty: 0 | Refills: 0 | Status: DISCONTINUED | OUTPATIENT
Start: 2018-01-05 | End: 2018-01-09

## 2018-01-05 RX ORDER — LOSARTAN POTASSIUM 100 MG/1
25 TABLET, FILM COATED ORAL DAILY
Qty: 0 | Refills: 0 | Status: DISCONTINUED | OUTPATIENT
Start: 2018-01-05 | End: 2018-01-09

## 2018-01-05 RX ADMIN — Medication 1 ENEMA: at 17:02

## 2018-01-05 RX ADMIN — Medication 2: at 16:59

## 2018-01-05 RX ADMIN — Medication 100 MILLIGRAM(S): at 11:53

## 2018-01-05 RX ADMIN — Medication 20 MILLIGRAM(S): at 06:31

## 2018-01-05 RX ADMIN — Medication 81 MILLIGRAM(S): at 11:53

## 2018-01-05 RX ADMIN — ENOXAPARIN SODIUM 40 MILLIGRAM(S): 100 INJECTION SUBCUTANEOUS at 11:53

## 2018-01-05 RX ADMIN — LOSARTAN POTASSIUM 25 MILLIGRAM(S): 100 TABLET, FILM COATED ORAL at 21:45

## 2018-01-05 RX ADMIN — ZOLPIDEM TARTRATE 5 MILLIGRAM(S): 10 TABLET ORAL at 22:33

## 2018-01-05 RX ADMIN — AMLODIPINE BESYLATE 10 MILLIGRAM(S): 2.5 TABLET ORAL at 06:31

## 2018-01-05 RX ADMIN — MAGNESIUM HYDROXIDE 30 MILLILITER(S): 400 TABLET, CHEWABLE ORAL at 10:04

## 2018-01-05 NOTE — PROGRESS NOTE ADULT - SUBJECTIVE AND OBJECTIVE BOX
PGY 1 Note discussed with supervising resident and primary attending    Patient is a 89y old  Female who presents with a chief complaint of Shortness of breath with fever and cough. (04 Jan 2018 15:31)      INTERVAL HPI/OVERNIGHT EVENTS: Pt was seen and examined at bedside. No acute events overnight. Pt offers no new complaints; current symptoms resolving. Pt's breathing has improved - all questions/concerns addressed.     MEDICATIONS  (STANDING):  ALBUTerol/ipratropium for Nebulization 3 milliLiter(s) Nebulizer every 6 hours  aspirin  chewable 81 milliGRAM(s) Oral daily  atorvastatin 20 milliGRAM(s) Oral at bedtime  docusate sodium 100 milliGRAM(s) Oral daily  enoxaparin Injectable 40 milliGRAM(s) SubCutaneous daily  influenza   Vaccine 0.5 milliLiter(s) IntraMuscular once  insulin lispro (HumaLOG) corrective regimen sliding scale   SubCutaneous Before meals and at bedtime  levoFLOXacin  Tablet 500 milliGRAM(s) Oral every 24 hours  losartan 25 milliGRAM(s) Oral daily  predniSONE   Tablet   Oral   predniSONE   Tablet 20 milliGRAM(s) Oral daily  senna 2 Tablet(s) Oral at bedtime    MEDICATIONS  (PRN):  LORazepam   Injectable 2 milliGRAM(s) IV Push every 4 hours PRN Anxiety  magnesium hydroxide Suspension 30 milliLiter(s) Oral daily PRN Constipation  zolpidem 5 milliGRAM(s) Oral at bedtime PRN Insomnia      __________________________________________________  REVIEW OF SYSTEMS:    CONSTITUTIONAL: No fever, chills, night sweats  RESPIRATORY: No cough; No shortness of breath  CARDIOVASCULAR: No chest pain, no palpitations  GASTROINTESTINAL: No pain. No nausea or vomiting; No diarrhea  NEUROLOGICAL: No headache or numbness, no tremors  MUSCULOSKELETAL: No joint pain, no muscle pain  GENITOURINARY: no dysuria, no frequency, no hesitancy  PSYCHIATRY: no depression , no anxiety  ALL OTHER ROS negative        Vital Signs Last 24 Hrs  T(C): 36.6 (05 Jan 2018 07:41), Max: 37.1 (04 Jan 2018 15:45)  T(F): 97.9 (05 Jan 2018 07:41), Max: 98.8 (04 Jan 2018 15:45)  HR: 63 (05 Jan 2018 07:41) (63 - 101)  BP: 138/59 (05 Jan 2018 07:41) (138/59 - 159/63)  BP(mean): --  RR: 18 (05 Jan 2018 07:41) (17 - 18)  SpO2: 99% (05 Jan 2018 07:41) (95% - 99%)    ________________________________________________  PHYSICAL EXAM:  GENERAL: NAD.   HEENT: Normocephalic;  conjunctivae and sclerae clear; moist mucous membranes;   NECK : supple  CHEST/LUNG: Clear to auscultation bilaterally with good air entry   HEART: S1 S2  regular; no murmurs, gallops or rubs  ABDOMEN: Soft, Nontender, Nondistended; Bowel sounds present  EXTREMITIES: no cyanosis; no edema; no calf tenderness; peripheral pulses intact  SKIN: warm and dry; no rash  NERVOUS SYSTEM:  Awake and alert; Oriented  to place, person and time ; no new deficits    _________________________________________________  LABS:                        12.1   9.8   )-----------( 244      ( 05 Jan 2018 05:35 )             38.1     01-05    138  |  105  |  29<H>  ----------------------------<  132<H>  3.8   |  25  |  0.96    Ca    9.2      05 Jan 2018 05:35  Phos  3.7     01-05  Mg     2.3     01-05    TPro  7.3  /  Alb  3.3<L>  /  TBili  0.3  /  DBili  x   /  AST  14  /  ALT  17  /  AlkPhos  70  01-04        CAPILLARY BLOOD GLUCOSE      POCT Blood Glucose.: 118 mg/dL (05 Jan 2018 07:40)  POCT Blood Glucose.: 171 mg/dL (04 Jan 2018 21:22)  POCT Blood Glucose.: 128 mg/dL (04 Jan 2018 16:33)  POCT Blood Glucose.: 233 mg/dL (04 Jan 2018 11:52)        RADIOLOGY & ADDITIONAL TESTS:    < from: Transthoracic Echocardiogram (01.03.18 @ 07:25) >  CONCLUSIONS:  1. Trace mitral regurgitation.  2. Normal left ventricular internal dimensions and wall  thicknesses.  3. Endocardium not well visualized; grossly normal left  ventricular systolic function. Segmental wall motion could  not be assessed.  4. Normal right ventricular size and function.    < end of copied text >      < from: CT Chest No Cont (01.04.18 @ 13:37) >  IMPRESSION:    No evidenceof pneumonia.    < end of copied text >      Consultant(s) Notes Reviewed:   YES    Care Discussed with PGY-2/Attending/Consultants :  YES    Plan of care was discussed with patient and /or primary care giver; all questions and concerns were addressed and care was aligned with patient's wishes.

## 2018-01-05 NOTE — PROGRESS NOTE ADULT - PROBLEM SELECTOR PLAN 2
UA positive  Lactate still elevated s/p 3L bolus... given h/o CHF... will monitor clinically  c/w PO levaquin DAY 2  UCx contaminated

## 2018-01-05 NOTE — PROGRESS NOTE ADULT - PROBLEM SELECTOR PLAN 1
Likely secondary to Asthma exacerbation secondary to Bronchopneumonia.   RVP negative.   c/w PO Pred taper  CT Chest w/o Cont -   No evidence of pneumonia.

## 2018-01-05 NOTE — PROGRESS NOTE ADULT - PROBLEM SELECTOR PLAN 3
Cardiology Dr. Patel  ECHO EF 55%  abnormal EKG... borderline 1 elevated troponin  NST when pulmonary symptoms improve

## 2018-01-05 NOTE — PROGRESS NOTE ADULT - SUBJECTIVE AND OBJECTIVE BOX
_________________________________________________________________________________________  ========>>  M E D I C A L   A T T E N D I N G    F O L L O W  U P  N O T E  <<=========  -----------------------------------------------------------------------------------------------------    - Patient seen and examined by me earlier today.  - In summary, patient is a 89y year old woman who originally presented with SOB.  - Patient today overall doing ok, comfortable, eating OK. overall feels better, less cough, + c/o constipation     ==================>> REVIEW OF SYSTEM <<=================    GEN: no fever, no chills, no pain  RESP: no SOB, + cough improved , no sputum  CVS: no chest pain, no palpitations, no edema  GI: no abdominal pain, no nausea, + constipation  : no dysuria, no frequency, no hematuria  Neuro: ? headache at times, no dizziness  Derm : no itching, no rash    ==================>> PHYSICAL EXAM <<=================    GEN: A&O X 3 , NAD , comfortable, appears younger than stated age  HEENT: NCAT, PERRL, MMM, hearing intact  Neck: supple , no JVD  CVS: S1S2 , regular , No M/R/G appreciated  PULM: CTA B/L,  no W/R/R appreciated  ABD.: soft. non tender, non distended,  bowel sounds present  Extrem: intact pulses , no edema   PSYCH : normal mood,  not anxious       ==================>> MEDICATIONS <<====================    ALBUTerol/ipratropium for Nebulization 3 milliLiter(s) Nebulizer every 6 hours  aspirin  chewable 81 milliGRAM(s) Oral daily  atorvastatin 20 milliGRAM(s) Oral at bedtime  docusate sodium 100 milliGRAM(s) Oral daily  enoxaparin Injectable 40 milliGRAM(s) SubCutaneous daily  influenza   Vaccine 0.5 milliLiter(s) IntraMuscular once  insulin lispro (HumaLOG) corrective regimen sliding scale   SubCutaneous Before meals and at bedtime  levoFLOXacin  Tablet 500 milliGRAM(s) Oral every 24 hours  losartan 25 milliGRAM(s) Oral daily  predniSONE   Tablet   Oral   predniSONE   Tablet 20 milliGRAM(s) Oral daily  senna 2 Tablet(s) Oral at bedtime    MEDICATIONS  (PRN):  LORazepam   Injectable 2 milliGRAM(s) IV Push every 4 hours PRN Anxiety  magnesium hydroxide Suspension 30 milliLiter(s) Oral daily PRN Constipation  zolpidem 5 milliGRAM(s) Oral at bedtime PRN Insomnia    ==================>> VITAL SIGNS <<==================    Vital Signs Last 24 Hrs  T(C): 36.8 (01-05-18 @ 15:15)  T(F): 98.2 (01-05-18 @ 15:15), Max: 98.2 (01-05-18 @ 15:15)  HR: 80 (01-05-18 @ 15:15) (63 - 101)  BP: 167/77 (01-05-18 @ 15:15)  BP(mean): --  RR: 18 (01-05-18 @ 15:15) (18 - 18)  SpO2: 98% (01-05-18 @ 15:15) (95% - 99%)      POCT Blood Glucose.: 220 mg/dL (05 Jan 2018 16:03)  POCT Blood Glucose.: 144 mg/dL (05 Jan 2018 11:56)  POCT Blood Glucose.: 118 mg/dL (05 Jan 2018 07:40)  POCT Blood Glucose.: 171 mg/dL (04 Jan 2018 21:22)     ==================>> LAB AND IMAGING <<==================                        12.1   9.8   )-----------( 244      ( 05 Jan 2018 05:35 )             38.1        01-05    138  |  105  |  29<H>  ----------------------------<  132<H>  3.8   |  25  |  0.96    Ca    9.2      05 Jan 2018 05:35  Phos  3.7     01-05  Mg     2.3     01-05    TPro  7.3  /  Alb  3.3<L>  /  TBili  0.3  /  DBili  x   /  AST  14  /  ALT  17  /  AlkPhos  70  01-04    _______________________  C U L T U R E S :    Culture - Urine (collected 03 Jan 2018 11:08)  Source: .Urine Clean Catch (Midstream)  Final Report (05 Jan 2018 09:26):    Culture grew 3 or more types of organisms which indicate    collection contamination; consider recollection only if clinically    indicated.    Culture - Blood (collected 03 Jan 2018 10:55)  Source: .Blood Blood-Peripheral  Preliminary Report (04 Jan 2018 11:01):    No growth to date.    Culture - Blood (collected 03 Jan 2018 10:55)  Source: .Blood Blood-Peripheral  Preliminary Report (04 Jan 2018 11:01):    No growth to date.    ___________________________________________________________________________________  ===============>>  A S S E S S M E N T   A N D   P L A N <<===============  ------------------------------------------------------------------------------------------    · Assessment		  89 F from home walks with walker with PMH of Asthma, DM and Htn presented with shortness of breath associated with subjective fever and productive cough producing yellow phlegm.    Problem/Plan - 1:  ·  Problem: Moderate persistent asthma with mild exacerbation, possibly due to bronchitis, as no pneumonia on CT scan  RVP negative.   taper Prednisone as ordered  limit abx to 5 day and DC    Problem/Plan - 2:  ·  Problem: UTI   -Continue Levaquin.   -Follow urine cultures.    Problem/Plan - 3:  ·  Problem:  NO CHF on echo  BNP elevation likely lab error   cardio appreciated   monitor    Problem/Plan - 4:  ·  Problem: Hypertension  -Continue meds as above and monitor   - cardio appreciated    -GI/DVT Prophylaxis.  - Bowel regimen + enema as needed  - Possible discharge planing home Monday ? (has A which needs to be set up)   --------------------------------------------	  Case discussed with pt, RN, NP  Education given on plam of care  ___________________________  H. A[address] Chavez.  Pager: [phone]

## 2018-01-05 NOTE — PROGRESS NOTE ADULT - SUBJECTIVE AND OBJECTIVE BOX
CHIEF COMPLAINT:Patient is a 89y old  Female who presents with a chief complaint of Shortness of breath with fever and cough. Pt feeling better.    	  REVIEW OF SYSTEMS:  CONSTITUTIONAL: No fever, weight loss, or fatigue  EYES: No eye pain, visual disturbances, or discharge  ENT:  No difficulty hearing, tinnitus, vertigo; No sinus or throat pain  NECK: No pain or stiffness  RESPIRATORY: No cough, wheezing, chills or hemoptysis; No Shortness of Breath  CARDIOVASCULAR: No chest pain, palpitations, passing out, dizziness, or leg swelling  GASTROINTESTINAL: No abdominal or epigastric pain. No nausea, vomiting, or hematemesis; No diarrhea or constipation. No melena or hematochezia.  GENITOURINARY: No dysuria, frequency, hematuria, or incontinence  NEUROLOGICAL: No headaches, memory loss, loss of strength, numbness, or tremors  SKIN: No itching, burning, rashes, or lesions   LYMPH Nodes: No enlarged glands  ENDOCRINE: No heat or cold intolerance; No hair loss  MUSCULOSKELETAL: No joint pain or swelling; No muscle, back, or extremity pain  PSYCHIATRIC: No depression, anxiety, mood swings, or difficulty sleeping  HEME/LYMPH: No easy bruising, or bleeding gums  ALLERGY AND IMMUNOLOGIC: No hives or eczema	      PHYSICAL EXAM:  T(C): 36.6 (01-05-18 @ 07:41), Max: 37.2 (01-04-18 @ 11:23)  HR: 63 (01-05-18 @ 07:41) (63 - 101)  BP: 138/59 (01-05-18 @ 07:41) (133/58 - 159/63)  RR: 18 (01-05-18 @ 07:41) (17 - 18)  SpO2: 99% (01-05-18 @ 07:41) (95% - 100%)    I&O's Summary    04 Jan 2018 07:01  -  05 Jan 2018 07:00  --------------------------------------------------------  IN: 0 mL / OUT: 300 mL / NET: -300 mL        Appearance: Normal	  HEENT:   Normal oral mucosa, PERRL, EOMI	  Lymphatic: No lymphadenopathy  Cardiovascular: Normal S1 S2, No JVD, No murmurs, No edema  Respiratory: B/L [facility]  Psychiatry: A & O x 3, Mood & affect appropriate  Gastrointestinal:  Soft, Non-tender, + BS	  Skin: No rashes, No ecchymoses, No cyanosis	  Neurologic: Non-focal  Extremities: Normal range of motion, No clubbing, cyanosis or edema  Vascular: Peripheral pulses palpable 2+ bilaterally    MEDICATIONS  (STANDING):  ALBUTerol/ipratropium for Nebulization 3 milliLiter(s) Nebulizer every 6 hours  amLODIPine   Tablet 10 milliGRAM(s) Oral daily  aspirin  chewable 81 milliGRAM(s) Oral daily  atorvastatin 20 milliGRAM(s) Oral at bedtime  docusate sodium 100 milliGRAM(s) Oral daily  enoxaparin Injectable 40 milliGRAM(s) SubCutaneous daily  influenza   Vaccine 0.5 milliLiter(s) IntraMuscular once  insulin lispro (HumaLOG) corrective regimen sliding scale   SubCutaneous Before meals and at bedtime  levoFLOXacin  Tablet 500 milliGRAM(s) Oral every 24 hours  predniSONE   Tablet   Oral   predniSONE   Tablet 20 milliGRAM(s) Oral daily  senna 2 Tablet(s) Oral at bedtime      	  LABS:	 	    CARDIAC MARKERS:  CARDIAC MARKERS ( 03 Jan 2018 10:37 )  <0.015 ng/mL / x     / 43 U/L / x     / 1.4 ng/mL                         12.1   9.8   )-----------( 244      ( 05 Jan 2018 05:35 )             38.1     01-05    138  |  105  |  29<H>  ----------------------------<  132<H>  3.8   |  25  |  0.96    Ca    9.2      05 Jan 2018 05:35  Phos  3.7     01-05  Mg     2.3     01-05    TPro  7.3  /  Alb  3.3<L>  /  TBili  0.3  /  DBili  x   /  AST  14  /  ALT  17  /  AlkPhos  70  01-04    proBNP: Serum Pro-Brain Natriuretic Peptide: 173 pg/mL (01-03 @ 10:37)  Serum Pro-Brain Natriuretic Peptide: 95194 pg/mL (01-02 @ 23:11)    Lipid Profile: Cholesterol 218    HDL 89  TG 70    HgA1c: Hemoglobin A1C, Whole Blood: 6.4 % (01-04 @ 09:29)  Hemoglobin A1C, Whole Blood: 6.4 % (01-03 @ 09:46)    TSH: Thyroid Stimulating Hormone, Serum: 0.84 uU/mL (01-04 @ 07:53)    INTERPRETATION:  CLINICAL INFORMATION: Chest pain. Abnormal chest x-ray.    COMPARISON: None. Correlation is made to chest radiographs from January 4, 2018 and January 2, 2018.    PROCEDURE:   CT of the Chest was performed without intravenous contrast.  Sagittal and coronal reformats were performed as well as 3D (MIP)   reconstructions.    FINDINGS:    LUNGS AND LARGE AIRWAYS: Patent central airways.  No pulmonarynodules.    PLEURA: No pleural effusion.    VESSELS: Atheromatous disease of the thoracic aorta. No thoracic aortic   aneurysm.    HEART: Heart size is normal. No pericardial effusion. Aortic valve   calcifications.    MEDIASTINUM AND AMANDA: No lymphadenopathy.    CHEST WALL AND LOWER NECK: Within normal limits.    VISUALIZED UPPER ABDOMEN: Partially visualized 1.4 cm right renal cyst.    BONES: Vertebral augmentation at T11 and T12. Probable ankylosing   spondylitis.    IMPRESSION:    No evidence of pneumonia.    Echocardiogram:  	  OBSERVATIONS:  Mitral Valve: Normal mitral valve. Trace mitral  regurgitation.  Aortic Root: Normal aortic root.  Aortic Valve: Calcified aortic valve with normal opening.  Left Atrium: Normal left atrium.  LA volume index = 24  cc/m2.  Left Ventricle: Endocardium not well visualized; grossly  normal left ventricular systolic function. Segmental wall  motion could not be assessed. Normal left ventricular  internal dimensions and wall thicknesses.  Right Heart: Normal right atrium. Normal right ventricular  size and function. There is mild tricuspid regurgitation.  There is mild pulmonic regurgitation.  Pericardium/PleuraNormal pericardium with no pericardial  effusion.  Hemodynamic: RA Pressure is 8 mm Hg. RV systolic pressure  is33 mm Hg.

## 2018-01-06 LAB
GLUCOSE BLDC GLUCOMTR-MCNC: 101 MG/DL — HIGH (ref 70–99)
GLUCOSE BLDC GLUCOMTR-MCNC: 109 MG/DL — HIGH (ref 70–99)
GLUCOSE BLDC GLUCOMTR-MCNC: 204 MG/DL — HIGH (ref 70–99)
GLUCOSE BLDC GLUCOMTR-MCNC: 224 MG/DL — HIGH (ref 70–99)

## 2018-01-06 RX ADMIN — ENOXAPARIN SODIUM 40 MILLIGRAM(S): 100 INJECTION SUBCUTANEOUS at 12:29

## 2018-01-06 RX ADMIN — SENNA PLUS 2 TABLET(S): 8.6 TABLET ORAL at 21:17

## 2018-01-06 RX ADMIN — Medication 81 MILLIGRAM(S): at 12:29

## 2018-01-06 RX ADMIN — LOSARTAN POTASSIUM 25 MILLIGRAM(S): 100 TABLET, FILM COATED ORAL at 06:24

## 2018-01-06 RX ADMIN — Medication 5 MILLIGRAM(S): at 12:31

## 2018-01-06 RX ADMIN — Medication 20 MILLIGRAM(S): at 06:24

## 2018-01-06 RX ADMIN — Medication 2: at 12:29

## 2018-01-06 RX ADMIN — Medication 2: at 17:12

## 2018-01-06 RX ADMIN — Medication 5 MILLIGRAM(S): at 21:17

## 2018-01-06 RX ADMIN — Medication 100 MILLIGRAM(S): at 12:29

## 2018-01-06 RX ADMIN — ZOLPIDEM TARTRATE 5 MILLIGRAM(S): 10 TABLET ORAL at 22:13

## 2018-01-06 NOTE — PROGRESS NOTE ADULT - SUBJECTIVE AND OBJECTIVE BOX
PGY 1 Note discussed with supervising resident and primary attending    Patient is a 89y old  Female who presents with a chief complaint of Shortness of breath with fever and cough. (04 Jan 2018 15:31)      INTERVAL HPI/OVERNIGHT EVENTS: Pt was seen and examined at bedside. No acute events overnight. Pt offers no new complaints; current symptoms resolving    MEDICATIONS  (STANDING):  ALBUTerol/ipratropium for Nebulization 3 milliLiter(s) Nebulizer every 6 hours  aspirin  chewable 81 milliGRAM(s) Oral daily  atorvastatin 20 milliGRAM(s) Oral at bedtime  bisacodyl 5 milliGRAM(s) Oral at bedtime  bisacodyl 5 milliGRAM(s) Oral daily  docusate sodium 100 milliGRAM(s) Oral daily  enoxaparin Injectable 40 milliGRAM(s) SubCutaneous daily  influenza   Vaccine 0.5 milliLiter(s) IntraMuscular once  insulin lispro (HumaLOG) corrective regimen sliding scale   SubCutaneous Before meals and at bedtime  levoFLOXacin  Tablet 500 milliGRAM(s) Oral every 24 hours  losartan 25 milliGRAM(s) Oral daily  predniSONE   Tablet   Oral   senna 2 Tablet(s) Oral at bedtime    MEDICATIONS  (PRN):  LORazepam   Injectable 2 milliGRAM(s) IV Push every 4 hours PRN Anxiety  magnesium hydroxide Suspension 30 milliLiter(s) Oral daily PRN Constipation  zolpidem 5 milliGRAM(s) Oral at bedtime PRN Insomnia      __________________________________________________  REVIEW OF SYSTEMS:    CONSTITUTIONAL: No fever, chills, night sweats  EYES: no acute visual disturbances  NECK: No pain or stiffness  RESPIRATORY: No cough; No shortness of breath  CARDIOVASCULAR: No chest pain, no palpitations  GASTROINTESTINAL: No pain. No nausea or vomiting; No diarrhea  NEUROLOGICAL: No headache or numbness, no tremors  MUSCULOSKELETAL: No joint pain, no muscle pain  GENITOURINARY: no dysuria, no frequency, no hesitancy  PSYCHIATRY: no depression , no anxiety  ALL OTHER ROS negative        Vital Signs Last 24 Hrs  T(C): 37 (06 Jan 2018 08:15), Max: 37 (05 Jan 2018 23:50)  T(F): 98.6 (06 Jan 2018 08:15), Max: 98.6 (05 Jan 2018 23:50)  HR: 91 (06 Jan 2018 08:15) (68 - 91)  BP: 134/72 (06 Jan 2018 08:15) (129/57 - 167/77)  BP(mean): --  RR: 18 (06 Jan 2018 08:15) (18 - 18)  SpO2: 98% (06 Jan 2018 08:15) (97% - 99%)    ________________________________________________  PHYSICAL EXAM:  GENERAL: NAD. Well appearing, well nourished. Normal mood & affect. Ambulating w/o difficulty.  HEENT: Normocephalic;  conjunctivae and sclerae clear; moist mucous membranes;   NECK : supple  CHEST/LUNG: Clear to auscultation bilaterally with good air entry   HEART: S1 S2  regular; no murmurs, gallops or rubs  ABDOMEN: Soft, Nontender, Nondistended; Bowel sounds present  EXTREMITIES: no cyanosis; no edema; no calf tenderness; peripheral pulses intact  SKIN: warm and dry; no rash  NERVOUS SYSTEM:  Awake and alert; Oriented  to place, person and time ; no new deficits    _________________________________________________  LABS:                        12.1   9.8   )-----------( 244      ( 05 Jan 2018 05:35 )             38.1     01-05    138  |  105  |  29<H>  ----------------------------<  132<H>  3.8   |  25  |  0.96    Ca    9.2      05 Jan 2018 05:35  Phos  3.7     01-05  Mg     2.3     01-05          CAPILLARY BLOOD GLUCOSE      POCT Blood Glucose.: 109 mg/dL (06 Jan 2018 08:04)  POCT Blood Glucose.: 94 mg/dL (05 Jan 2018 21:03)  POCT Blood Glucose.: 220 mg/dL (05 Jan 2018 16:03)  POCT Blood Glucose.: 144 mg/dL (05 Jan 2018 11:56)        RADIOLOGY & ADDITIONAL TESTS:    Imaging Personally Reviewed:  YES    Consultant(s) Notes Reviewed:   YES    Care Discussed with PGY-2/Attending/Consultants :  YES    Plan of care was discussed with patient and /or primary care giver; all questions and concerns were addressed and care was aligned with patient's wishes. PGY 1 Note discussed with supervising resident and primary attending    Patient is a 89y old  Female who presents with a chief complaint of Shortness of breath with fever and cough. (04 Jan 2018 15:31)      INTERVAL HPI/OVERNIGHT EVENTS: Pt was seen and examined at bedside. No acute events overnight. Pt offers no new complaints; current symptoms resolving. Pt said cozaar is giving her coughing episodes and she has tried it in the past.    MEDICATIONS  (STANDING):  ALBUTerol/ipratropium for Nebulization 3 milliLiter(s) Nebulizer every 6 hours  aspirin  chewable 81 milliGRAM(s) Oral daily  atorvastatin 20 milliGRAM(s) Oral at bedtime  bisacodyl 5 milliGRAM(s) Oral at bedtime  bisacodyl 5 milliGRAM(s) Oral daily  docusate sodium 100 milliGRAM(s) Oral daily  enoxaparin Injectable 40 milliGRAM(s) SubCutaneous daily  influenza   Vaccine 0.5 milliLiter(s) IntraMuscular once  insulin lispro (HumaLOG) corrective regimen sliding scale   SubCutaneous Before meals and at bedtime  levoFLOXacin  Tablet 500 milliGRAM(s) Oral every 24 hours  losartan 25 milliGRAM(s) Oral daily  predniSONE   Tablet   Oral   senna 2 Tablet(s) Oral at bedtime    MEDICATIONS  (PRN):  LORazepam   Injectable 2 milliGRAM(s) IV Push every 4 hours PRN Anxiety  magnesium hydroxide Suspension 30 milliLiter(s) Oral daily PRN Constipation  zolpidem 5 milliGRAM(s) Oral at bedtime PRN Insomnia      __________________________________________________  REVIEW OF SYSTEMS:    CONSTITUTIONAL: No fever, chills, night sweats  RESPIRATORY: No cough; No shortness of breath  CARDIOVASCULAR: No chest pain, no palpitations  GASTROINTESTINAL: No pain. No nausea or vomiting; No diarrhea        Vital Signs Last 24 Hrs  T(C): 37 (06 Jan 2018 08:15), Max: 37 (05 Jan 2018 23:50)  T(F): 98.6 (06 Jan 2018 08:15), Max: 98.6 (05 Jan 2018 23:50)  HR: 91 (06 Jan 2018 08:15) (68 - 91)  BP: 134/72 (06 Jan 2018 08:15) (129/57 - 167/77)  BP(mean): --  RR: 18 (06 Jan 2018 08:15) (18 - 18)  SpO2: 98% (06 Jan 2018 08:15) (97% - 99%)    ________________________________________________  PHYSICAL EXAM:  GENERAL: NAD. Well appearing, well nourished. Normal mood & affect. Ambulating w/o difficulty.  HEENT: Normocephalic;  conjunctivae and sclerae clear; moist mucous membranes;   NECK : supple  CHEST/LUNG: Clear to auscultation bilaterally with good air entry   HEART: S1 S2  regular; no murmurs, gallops or rubs  ABDOMEN: Soft, Nontender, Nondistended; Bowel sounds present  EXTREMITIES: no cyanosis; no edema; no calf tenderness; peripheral pulses intact  SKIN: warm and dry; no rash  NERVOUS SYSTEM:  Awake and alert; Oriented  to place, person and time ; no new deficits    _________________________________________________  LABS:                        12.1   9.8   )-----------( 244      ( 05 Jan 2018 05:35 )             38.1     01-05    138  |  105  |  29<H>  ----------------------------<  132<H>  3.8   |  25  |  0.96    Ca    9.2      05 Jan 2018 05:35  Phos  3.7     01-05  Mg     2.3     01-05          CAPILLARY BLOOD GLUCOSE      POCT Blood Glucose.: 109 mg/dL (06 Jan 2018 08:04)  POCT Blood Glucose.: 94 mg/dL (05 Jan 2018 21:03)  POCT Blood Glucose.: 220 mg/dL (05 Jan 2018 16:03)  POCT Blood Glucose.: 144 mg/dL (05 Jan 2018 11:56)      Consultant(s) Notes Reviewed:   YES    Care Discussed with PGY-2/Attending/Consultants :  YES    Plan of care was discussed with patient and /or primary care giver; all questions and concerns were addressed and care was aligned with patient's wishes.

## 2018-01-06 NOTE — PROGRESS NOTE ADULT - SUBJECTIVE AND OBJECTIVE BOX
_________________________________________________________________________________________  ========>>  M E D I C A L   A T T E N D I N G    F O L L O W  U P  N O T E  <<=========  -----------------------------------------------------------------------------------------------------    - Patient seen and examined by me earlier today.  - In summary, patient is a 89y year old woman who originally presented with SOB.  - Patient today overall doing ok, comfortable, eating OK. overall feels better, less cough, constipation resolved post enema    ==================>> REVIEW OF SYSTEM <<=================    GEN: no fever, no chills, no pain  RESP: no SOB, + cough improved , no sputum  CVS: no chest pain, no palpitations, no edema  GI: no abdominal pain, no nausea,  constipation resolved  : no dysuria, no frequency, no hematuria  Neuro: ? headache /heaviness at times, no dizziness  Derm : no itching, no rash    ==================>> PHYSICAL EXAM <<=================    GEN: A&O X 3 , NAD , comfortable, appears younger than stated age  HEENT: NCAT, PERRL, MMM, hearing intact  Neck: supple , no JVD  CVS: S1S2 , regular , No M/R/G appreciated  PULM: CTA B/L,  no W/R/R appreciated  ABD.: soft. non tender, non distended,  bowel sounds present  Extrem: intact pulses , no edema   PSYCH : normal mood,  not anxious       ==================>> MEDICATIONS <<====================    ALBUTerol/ipratropium for Nebulization 3 milliLiter(s) Nebulizer every 6 hours  aspirin  chewable 81 milliGRAM(s) Oral daily  atorvastatin 20 milliGRAM(s) Oral at bedtime  bisacodyl 5 milliGRAM(s) Oral at bedtime  bisacodyl 5 milliGRAM(s) Oral daily  docusate sodium 100 milliGRAM(s) Oral daily  enoxaparin Injectable 40 milliGRAM(s) SubCutaneous daily  influenza   Vaccine 0.5 milliLiter(s) IntraMuscular once  insulin lispro (HumaLOG) corrective regimen sliding scale   SubCutaneous Before meals and at bedtime  levoFLOXacin  Tablet 500 milliGRAM(s) Oral every 24 hours  losartan 25 milliGRAM(s) Oral daily  predniSONE   Tablet   Oral   senna 2 Tablet(s) Oral at bedtime    MEDICATIONS  (PRN):  LORazepam   Injectable 2 milliGRAM(s) IV Push every 4 hours PRN Anxiety  magnesium hydroxide Suspension 30 milliLiter(s) Oral daily PRN Constipation  zolpidem 5 milliGRAM(s) Oral at bedtime PRN Insomnia    ==================>> VITAL SIGNS <<==================    Vital Signs Last 24 Hrs  T(C): 37 (01-06-18 @ 08:15)  T(F): 98.6 (01-06-18 @ 08:15), Max: 98.6 (01-05-18 @ 23:50)  HR: 91 (01-06-18 @ 08:15) (68 - 91)  BP: 134/72 (01-06-18 @ 08:15)  BP(mean): --  RR: 18 (01-06-18 @ 08:15) (18 - 18)  SpO2: 98% (01-06-18 @ 08:15) (97% - 99%)    CAPILLARY BLOOD GLUCOSE      POCT Blood Glucose.: 204 mg/dL (06 Jan 2018 11:38)  POCT Blood Glucose.: 109 mg/dL (06 Jan 2018 08:04)  POCT Blood Glucose.: 94 mg/dL (05 Jan 2018 21:03)  POCT Blood Glucose.: 220 mg/dL (05 Jan 2018 16:03)     ==================>> LAB AND IMAGING <<==================                        12.1   9.8   )-----------( 244      ( 05 Jan 2018 05:35 )             38.1        01-05    138  |  105  |  29<H>  ----------------------------<  132<H>  3.8   |  25  |  0.96    Ca    9.2      05 Jan 2018 05:35  Phos  3.7     01-05  Mg     2.3     01-05      _______________________  C U L T U R E S :    Culture - Urine (collected 03 Jan 2018 11:08)  Source: .Urine Clean Catch (Midstream)  Final Report (05 Jan 2018 09:26):    Culture grew 3 or more types of organisms which indicate    collection contamination; consider recollection only if clinically    indicated.    Culture - Blood (collected 03 Jan 2018 10:55)  Source: .Blood Blood-Peripheral  Preliminary Report (04 Jan 2018 11:01):    No growth to date.    Culture - Blood (collected 03 Jan 2018 10:55)  Source: .Blood Blood-Peripheral  Preliminary Report (04 Jan 2018 11:01):    No growth to date.    ___________________________________________________________________________________  ===============>>  A S S E S S M E N T   A N D   P L A N <<===============  ------------------------------------------------------------------------------------------    · Assessment		  89 F from home walks with walker with PMH of Asthma, DM and Htn presented with shortness of breath associated with subjective fever and productive cough producing yellow phlegm.    Problem/Plan - 1:  ·  Problem: Moderate persistent asthma with mild exacerbation, possibly due to bronchitis, as no pneumonia on CT scan  RVP negative.   taper Prednisone as ordered  limit abx to 5 day and DC    Problem/Plan - 2:  ·  Problem: UTI   -Continue Levaquin: duration as above  -Follow urine cultures.    Problem/Plan - 3:  ·  Problem:  NO CHF on echo  BNP elevation likely lab error   cardio appreciated   monitor  stress Monday    Problem/Plan - 4:  ·  Problem: Hypertension  -Continue meds as above and monitor   - cardio appreciated: stress monday    -GI/DVT Prophylaxis.  - Bowel regimen + enema as needed  - Possible discharge planing home Monday ? (has A which needs to be set up)   --------------------------------------------	  Case discussed with pt, RN, cardio  Education given on [facility] of care  ___________________________  [address] Chavez.  Pager: [phone]

## 2018-01-06 NOTE — PROGRESS NOTE ADULT - SUBJECTIVE AND OBJECTIVE BOX
CHIEF COMPLAINT:Patient is a 89y old  Female who presents with a chief complaint of Shortness of breath with fever and cough. Pt appears comfortable.    	  REVIEW OF SYSTEMS:  CONSTITUTIONAL: No fever, weight loss, or fatigue  EYES: No eye pain, visual disturbances, or discharge  ENT:  No difficulty hearing, tinnitus, vertigo; No sinus or throat pain  NECK: No pain or stiffness  RESPIRATORY: No cough, wheezing, chills or hemoptysis; No Shortness of Breath  CARDIOVASCULAR: No chest pain, palpitations, passing out, dizziness, or leg swelling  GASTROINTESTINAL: No abdominal or epigastric pain. No nausea, vomiting, or hematemesis; No diarrhea or constipation. No melena or hematochezia.  GENITOURINARY: No dysuria, frequency, hematuria, or incontinence  NEUROLOGICAL: No headaches, memory loss, loss of strength, numbness, or tremors  SKIN: No itching, burning, rashes, or lesions   LYMPH Nodes: No enlarged glands  ENDOCRINE: No heat or cold intolerance; No hair loss  MUSCULOSKELETAL: No joint pain or swelling; No muscle, back, or extremity pain  PSYCHIATRIC: No depression, anxiety, mood swings, or difficulty sleeping  HEME/LYMPH: No easy bruising, or bleeding gums  ALLERGY AND IMMUNOLOGIC: No hives or eczema	      PHYSICAL EXAM:  T(C): 37 (01-06-18 @ 08:15), Max: 37 (01-05-18 @ 23:50)  HR: 91 (01-06-18 @ 08:15) (68 - 91)  BP: 134/72 (01-06-18 @ 08:15) (129/57 - 167/77)  RR: 18 (01-06-18 @ 08:15) (18 - 18)  SpO2: 98% (01-06-18 @ 08:15) (97% - 99%)  Wt(kg): --  I&O's Summary      Appearance: Normal	  HEENT:   Normal oral mucosa, PERRL, EOMI	  Lymphatic: No lymphadenopathy  Cardiovascular: Normal S1 S2, No JVD, No murmurs, No edema  Respiratory: Lungs clear to auscultation	  Psychiatry: A & O x 3, Mood & affect appropriate  Gastrointestinal:  Soft, Non-tender, + BS	  Skin: No rashes, No ecchymoses, No cyanosis	  Neurologic: Non-focal  Extremities: Normal range of motion, No clubbing, cyanosis or edema  Vascular: Peripheral pulses palpable 2+ bilaterally    MEDICATIONS  (STANDING):  ALBUTerol/ipratropium for Nebulization 3 milliLiter(s) Nebulizer every 6 hours  aspirin  chewable 81 milliGRAM(s) Oral daily  atorvastatin 20 milliGRAM(s) Oral at bedtime  bisacodyl 5 milliGRAM(s) Oral at bedtime  bisacodyl 5 milliGRAM(s) Oral daily  docusate sodium 100 milliGRAM(s) Oral daily  enoxaparin Injectable 40 milliGRAM(s) SubCutaneous daily  influenza   Vaccine 0.5 milliLiter(s) IntraMuscular once  insulin lispro (HumaLOG) corrective regimen sliding scale   SubCutaneous Before meals and at bedtime  levoFLOXacin  Tablet 500 milliGRAM(s) Oral every 24 hours  losartan 25 milliGRAM(s) Oral daily  predniSONE   Tablet   Oral   senna 2 Tablet(s) Oral at bedtime      LABS:	 	                       12.1   9.8   )-----------( 244      ( 05 Jan 2018 05:35 )             38.1     01-05    138  |  105  |  29<H>  ----------------------------<  132<H>  3.8   |  25  |  0.96    Ca    9.2      05 Jan 2018 05:35  Phos  3.7     01-05  Mg     2.3     01-05      proBNP: Serum Pro-Brain Natriuretic Peptide: 173 pg/mL (01-03 @ 10:37)  Serum Pro-Brain Natriuretic Peptide: 08137 pg/mL (01-02 @ 23:11)    Lipid Profile: Cholesterol 218    HDL 89  TG 70    HgA1c: Hemoglobin A1C, Whole Blood: 6.4 % (01-04 @ 09:29)  Hemoglobin A1C, Whole Blood: 6.4 % (01-03 @ 09:46)    TSH: Thyroid Stimulating Hormone, Serum: 0.84 uU/mL (01-04 @ 07:53)

## 2018-01-06 NOTE — PROGRESS NOTE ADULT - PROBLEM SELECTOR PLAN 2
UA positive  Lactate still elevated s/p 3L bolus... given h/o CHF... will monitor clinically  c/w PO levaquin DAY 3  UCx contaminated

## 2018-01-07 DIAGNOSIS — J20.9 ACUTE BRONCHITIS, UNSPECIFIED: ICD-10-CM

## 2018-01-07 LAB
GLUCOSE BLDC GLUCOMTR-MCNC: 109 MG/DL — HIGH (ref 70–99)
GLUCOSE BLDC GLUCOMTR-MCNC: 138 MG/DL — HIGH (ref 70–99)
GLUCOSE BLDC GLUCOMTR-MCNC: 286 MG/DL — HIGH (ref 70–99)
GLUCOSE BLDC GLUCOMTR-MCNC: 98 MG/DL — SIGNIFICANT CHANGE UP (ref 70–99)

## 2018-01-07 RX ADMIN — Medication 10 MILLIGRAM(S): at 06:19

## 2018-01-07 RX ADMIN — ATORVASTATIN CALCIUM 20 MILLIGRAM(S): 80 TABLET, FILM COATED ORAL at 22:39

## 2018-01-07 RX ADMIN — Medication 3: at 17:25

## 2018-01-07 RX ADMIN — ENOXAPARIN SODIUM 40 MILLIGRAM(S): 100 INJECTION SUBCUTANEOUS at 11:31

## 2018-01-07 RX ADMIN — LOSARTAN POTASSIUM 25 MILLIGRAM(S): 100 TABLET, FILM COATED ORAL at 06:19

## 2018-01-07 RX ADMIN — Medication 81 MILLIGRAM(S): at 11:31

## 2018-01-07 RX ADMIN — Medication 100 MILLIGRAM(S): at 11:31

## 2018-01-07 RX ADMIN — Medication 5 MILLIGRAM(S): at 22:40

## 2018-01-07 RX ADMIN — SENNA PLUS 2 TABLET(S): 8.6 TABLET ORAL at 22:40

## 2018-01-07 RX ADMIN — Medication 5 MILLIGRAM(S): at 11:31

## 2018-01-07 RX ADMIN — Medication 2 MILLIGRAM(S): at 12:26

## 2018-01-07 RX ADMIN — Medication 3 MILLILITER(S): at 21:21

## 2018-01-07 RX ADMIN — ZOLPIDEM TARTRATE 5 MILLIGRAM(S): 10 TABLET ORAL at 22:39

## 2018-01-07 NOTE — PROGRESS NOTE ADULT - SUBJECTIVE AND OBJECTIVE BOX
CHIEF COMPLAINT:Patient is a 89y old  Female who presents with a chief complaint of Shortness of breath with fever and cough. Pt appears comfortable.    	  REVIEW OF SYSTEMS:  CONSTITUTIONAL: No fever, weight loss, or fatigue  EYES: No eye pain, visual disturbances, or discharge  ENT:  No difficulty hearing, tinnitus, vertigo; No sinus or throat pain  NECK: No pain or stiffness  RESPIRATORY: No cough, wheezing, chills or hemoptysis; No Shortness of Breath  CARDIOVASCULAR: No chest pain, palpitations, passing out, dizziness, or leg swelling  GASTROINTESTINAL: No abdominal or epigastric pain. No nausea, vomiting, or hematemesis; No diarrhea or constipation. No melena or hematochezia.  GENITOURINARY: No dysuria, frequency, hematuria, or incontinence  NEUROLOGICAL: No headaches, memory loss, loss of strength, numbness, or tremors  SKIN: No itching, burning, rashes, or lesions   LYMPH Nodes: No enlarged glands  ENDOCRINE: No heat or cold intolerance; No hair loss  MUSCULOSKELETAL: No joint pain or swelling; No muscle, back, or extremity pain  PSYCHIATRIC: No depression, anxiety, mood swings, or difficulty sleeping  HEME/LYMPH: No easy bruising, or bleeding gums  ALLERGY AND IMMUNOLOGIC: No hives or eczema	      PHYSICAL EXAM:  T(C): 36.2 (01-07-18 @ 08:23), Max: 36.9 (01-06-18 @ 12:16)  HR: 62 (01-07-18 @ 08:23) (62 - 93)  BP: 127/48 (01-07-18 @ 08:23) (111/63 - 151/72)  RR: 18 (01-07-18 @ 08:23) (18 - 18)  SpO2: 100% (01-07-18 @ 08:23) (96% - 100%)    Appearance: Normal	  HEENT:   Normal oral mucosa, PERRL, EOMI	  Lymphatic: No lymphadenopathy  Cardiovascular: Normal S1 S2, No JVD, No murmurs, No edema  Respiratory: Lungs clear to auscultation	  Psychiatry: A & O x 3, Mood & affect appropriate  Gastrointestinal:  Soft, Non-tender, + BS	  Skin: No rashes, No ecchymoses, No cyanosis	  Neurologic: Non-focal  Extremities: Normal range of motion, No clubbing, cyanosis or edema  Vascular: Peripheral pulses palpable 2+ bilaterally    MEDICATIONS  (STANDING):  ALBUTerol/ipratropium for Nebulization 3 milliLiter(s) Nebulizer every 6 hours  aspirin  chewable 81 milliGRAM(s) Oral daily  atorvastatin 20 milliGRAM(s) Oral at bedtime  bisacodyl 5 milliGRAM(s) Oral at bedtime  bisacodyl 5 milliGRAM(s) Oral daily  docusate sodium 100 milliGRAM(s) Oral daily  enoxaparin Injectable 40 milliGRAM(s) SubCutaneous daily  influenza   Vaccine 0.5 milliLiter(s) IntraMuscular once  insulin lispro (HumaLOG) corrective regimen sliding scale   SubCutaneous Before meals and at bedtime  levoFLOXacin  Tablet 500 milliGRAM(s) Oral every 24 hours  losartan 25 milliGRAM(s) Oral daily  predniSONE   Tablet   Oral   predniSONE   Tablet 10 milliGRAM(s) Oral daily  senna 2 Tablet(s) Oral at bedtime      	  LABS:	 	      proBNP: Serum Pro-Brain Natriuretic Peptide: 173 pg/mL (01-03 @ 10:37)  Serum Pro-Brain Natriuretic Peptide: 89746 pg/mL (01-02 @ 23:11)    Lipid Profile: Cholesterol 218    HDL 89  TG 70    HgA1c: Hemoglobin A1C, Whole Blood: 6.4 % (01-04 @ 09:29)  Hemoglobin A1C, Whole Blood: 6.4 % (01-03 @ 09:46)    TSH: Thyroid Stimulating Hormone, Serum: 0.84 uU/mL (01-04 @ 07:53)

## 2018-01-07 NOTE — PROGRESS NOTE ADULT - SUBJECTIVE AND OBJECTIVE BOX
_________________________________________________________________________________________  ========>>  M E D I C A L   A T T E N D I N G    F O L L O W  U P  N O T E  <<=========  -----------------------------------------------------------------------------------------------------    - Patient seen and examined by me earlier today.  - In summary, patient is a 89y year old woman who originally presented with SOB.  - Patient today overall doing ok, comfortable, eating OK. overall feels better, less cough, constipation resolved post enema    ==================>> REVIEW OF SYSTEM <<=================    GEN: no fever, no chills, no pain  RESP: no SOB, + cough resolving, no sputum  CVS: no chest pain, no palpitations, no edema  GI: no abdominal pain, no nausea,  constipation resolved  : no dysuria, no frequency, no hematuria  Neuro: ? headache /heaviness at times, no dizziness  Derm : no itching, no rash    ==================>> PHYSICAL EXAM <<=================    GEN: A&O X 3 , NAD , comfortable, appears younger than stated age  HEENT: NCAT, PERRL, MMM, hearing intact  Neck: supple , no JVD  CVS: S1S2 , regular , No M/R/G appreciated  PULM: CTA B/L,  no W/R/R appreciated  ABD.: soft. non tender, non distended,  bowel sounds present  Extrem: intact pulses , no edema   PSYCH : normal mood,  not anxious      ==================>> MEDICATIONS <<====================    ALBUTerol/ipratropium for Nebulization 3 milliLiter(s) Nebulizer every 6 hours  aspirin  chewable 81 milliGRAM(s) Oral daily  atorvastatin 20 milliGRAM(s) Oral at bedtime  bisacodyl 5 milliGRAM(s) Oral at bedtime  bisacodyl 5 milliGRAM(s) Oral daily  docusate sodium 100 milliGRAM(s) Oral daily  enoxaparin Injectable 40 milliGRAM(s) SubCutaneous daily  influenza   Vaccine 0.5 milliLiter(s) IntraMuscular once  insulin lispro (HumaLOG) corrective regimen sliding scale   SubCutaneous Before meals and at bedtime  levoFLOXacin  Tablet 500 milliGRAM(s) Oral every 24 hours  losartan 25 milliGRAM(s) Oral daily  predniSONE   Tablet   Oral   predniSONE   Tablet 10 milliGRAM(s) Oral daily  senna 2 Tablet(s) Oral at bedtime    MEDICATIONS  (PRN):  LORazepam     Tablet 2 milliGRAM(s) Oral every 6 hours PRN Anxiety  magnesium hydroxide Suspension 30 milliLiter(s) Oral daily PRN Constipation  zolpidem 5 milliGRAM(s) Oral at bedtime PRN Insomnia    ==================>> VITAL SIGNS <<==================    Vital Signs Last 24 Hrs  T(C): 36.4 (01-07-18 @ 11:37)  T(F): 97.5 (01-07-18 @ 11:37), Max: 98.2 (01-06-18 @ 15:15)  HR: 66 (01-07-18 @ 11:37) (62 - 93)  BP: 124/58 (01-07-18 @ 11:37)  BP(mean): --  RR: 18 (01-07-18 @ 11:37) (18 - 18)  SpO2: 99% (01-07-18 @ 11:37) (96% - 100%)    CAPILLARY BLOOD GLUCOSE      POCT Blood Glucose.: 138 mg/dL (07 Jan 2018 11:10)  POCT Blood Glucose.: 109 mg/dL (07 Jan 2018 08:07)  POCT Blood Glucose.: 101 mg/dL (06 Jan 2018 21:04)  POCT Blood Glucose.: 224 mg/dL (06 Jan 2018 16:30)     ==================>> LAB AND IMAGING <<==================       no labs today    ___________________________________________________________________________________  ===============>>  A S S E S S M E N T   A N D   P L A N <<===============  ------------------------------------------------------------------------------------------    · Assessment		  89 F from home walks with walker with PMH of Asthma, DM and Htn presented with shortness of breath associated with subjective fever and productive cough producing yellow phlegm.    Problem/Plan - 1:  ·  Problem: Moderate persistent asthma with mild exacerbation, possibly due to bronchitis, as no pneumonia on CT scan  RVP negative.   taper Prednisone as ordered    Problem/Plan - 2:  ·  Problem: UTI   -Continue Levaquin: 5 days> DCed  -Follow urine cultures.    Problem/Plan - 3:  ·  Problem:  NO CHF on echo  BNP elevation likely lab error   cardio appreciated   monitor  stress tomorrow    Problem/Plan - 4:  ·  Problem: Hypertension  -Continue meds as above and monitor   - cardio appreciated: stress monday    -GI/DVT Prophylaxis.  - Bowel regimen + enema as needed  - Possible discharge planing home tomorrow post stress (has HHA which needs to be set up)   --------------------------------------------	  Case discussed with pt, RN, cardio  Education given on plam of care  ___________________________  [address].  Pager: [phone]

## 2018-01-08 DIAGNOSIS — R69 ILLNESS, UNSPECIFIED: ICD-10-CM

## 2018-01-08 LAB
ALBUMIN SERPL ELPH-MCNC: 3.1 G/DL — LOW (ref 3.5–5)
ALP SERPL-CCNC: 71 U/L — SIGNIFICANT CHANGE UP (ref 40–120)
ALT FLD-CCNC: 25 U/L DA — SIGNIFICANT CHANGE UP (ref 10–60)
ANION GAP SERPL CALC-SCNC: 7 MMOL/L — SIGNIFICANT CHANGE UP (ref 5–17)
AST SERPL-CCNC: 12 U/L — SIGNIFICANT CHANGE UP (ref 10–40)
BILIRUB SERPL-MCNC: 0.2 MG/DL — SIGNIFICANT CHANGE UP (ref 0.2–1.2)
BUN SERPL-MCNC: 35 MG/DL — HIGH (ref 7–18)
CALCIUM SERPL-MCNC: 8.8 MG/DL — SIGNIFICANT CHANGE UP (ref 8.4–10.5)
CHLORIDE SERPL-SCNC: 105 MMOL/L — SIGNIFICANT CHANGE UP (ref 96–108)
CO2 SERPL-SCNC: 27 MMOL/L — SIGNIFICANT CHANGE UP (ref 22–31)
CREAT SERPL-MCNC: 0.83 MG/DL — SIGNIFICANT CHANGE UP (ref 0.5–1.3)
CULTURE RESULTS: SIGNIFICANT CHANGE UP
CULTURE RESULTS: SIGNIFICANT CHANGE UP
GLUCOSE BLDC GLUCOMTR-MCNC: 107 MG/DL — HIGH (ref 70–99)
GLUCOSE BLDC GLUCOMTR-MCNC: 118 MG/DL — HIGH (ref 70–99)
GLUCOSE BLDC GLUCOMTR-MCNC: 151 MG/DL — HIGH (ref 70–99)
GLUCOSE SERPL-MCNC: 108 MG/DL — HIGH (ref 70–99)
HCT VFR BLD CALC: 35.6 % — SIGNIFICANT CHANGE UP (ref 34.5–45)
HGB BLD-MCNC: 11.4 G/DL — LOW (ref 11.5–15.5)
MCHC RBC-ENTMCNC: 27 PG — SIGNIFICANT CHANGE UP (ref 27–34)
MCHC RBC-ENTMCNC: 32.1 GM/DL — SIGNIFICANT CHANGE UP (ref 32–36)
MCV RBC AUTO: 84.1 FL — SIGNIFICANT CHANGE UP (ref 80–100)
PLATELET # BLD AUTO: 219 K/UL — SIGNIFICANT CHANGE UP (ref 150–400)
POTASSIUM SERPL-MCNC: 3.6 MMOL/L — SIGNIFICANT CHANGE UP (ref 3.5–5.3)
POTASSIUM SERPL-SCNC: 3.6 MMOL/L — SIGNIFICANT CHANGE UP (ref 3.5–5.3)
PROT SERPL-MCNC: 6.7 G/DL — SIGNIFICANT CHANGE UP (ref 6–8.3)
RBC # BLD: 4.24 M/UL — SIGNIFICANT CHANGE UP (ref 3.8–5.2)
RBC # FLD: 14 % — SIGNIFICANT CHANGE UP (ref 10.3–14.5)
SODIUM SERPL-SCNC: 139 MMOL/L — SIGNIFICANT CHANGE UP (ref 135–145)
SPECIMEN SOURCE: SIGNIFICANT CHANGE UP
SPECIMEN SOURCE: SIGNIFICANT CHANGE UP
WBC # BLD: 8.7 K/UL — SIGNIFICANT CHANGE UP (ref 3.8–10.5)
WBC # FLD AUTO: 8.7 K/UL — SIGNIFICANT CHANGE UP (ref 3.8–10.5)

## 2018-01-08 RX ORDER — ASPIRIN/CALCIUM CARB/MAGNESIUM 324 MG
1 TABLET ORAL
Qty: 30 | Refills: 0 | OUTPATIENT
Start: 2018-01-08 | End: 2018-02-06

## 2018-01-08 RX ORDER — ATORVASTATIN CALCIUM 80 MG/1
1 TABLET, FILM COATED ORAL
Qty: 30 | Refills: 0 | OUTPATIENT
Start: 2018-01-08 | End: 2018-02-06

## 2018-01-08 RX ORDER — CIPROFLOXACIN LACTATE 400MG/40ML
1 VIAL (ML) INTRAVENOUS
Qty: 0 | Refills: 0 | COMMUNITY

## 2018-01-08 RX ORDER — AMLODIPINE BESYLATE 2.5 MG/1
1 TABLET ORAL
Qty: 0 | Refills: 0 | COMMUNITY

## 2018-01-08 RX ADMIN — Medication 3 MILLILITER(S): at 14:37

## 2018-01-08 RX ADMIN — Medication 10 MILLIGRAM(S): at 05:40

## 2018-01-08 RX ADMIN — INFLUENZA VIRUS VACCINE 0.5 MILLILITER(S): 15; 15; 15; 15 SUSPENSION INTRAMUSCULAR at 15:59

## 2018-01-08 RX ADMIN — Medication 5 MILLIGRAM(S): at 22:53

## 2018-01-08 RX ADMIN — ATORVASTATIN CALCIUM 20 MILLIGRAM(S): 80 TABLET, FILM COATED ORAL at 22:53

## 2018-01-08 RX ADMIN — ENOXAPARIN SODIUM 40 MILLIGRAM(S): 100 INJECTION SUBCUTANEOUS at 13:58

## 2018-01-08 RX ADMIN — Medication 2 MILLIGRAM(S): at 15:56

## 2018-01-08 RX ADMIN — ZOLPIDEM TARTRATE 5 MILLIGRAM(S): 10 TABLET ORAL at 23:12

## 2018-01-08 RX ADMIN — Medication 81 MILLIGRAM(S): at 13:58

## 2018-01-08 RX ADMIN — Medication 1: at 16:30

## 2018-01-08 RX ADMIN — LOSARTAN POTASSIUM 25 MILLIGRAM(S): 100 TABLET, FILM COATED ORAL at 05:40

## 2018-01-08 RX ADMIN — Medication 100 MILLIGRAM(S): at 14:03

## 2018-01-08 RX ADMIN — Medication 3 MILLILITER(S): at 20:40

## 2018-01-08 RX ADMIN — SENNA PLUS 2 TABLET(S): 8.6 TABLET ORAL at 22:53

## 2018-01-08 NOTE — PROGRESS NOTE ADULT - SUBJECTIVE AND OBJECTIVE BOX
PGY 1 Note discussed with supervising resident and primary attending    Patient is a 89y old  Female who presents with a chief complaint of Shortness of breath with fever and cough. (04 Jan 2018 15:31)      INTERVAL HPI/OVERNIGHT EVENTS: Pt was seen and examined at bedside. No acute events overnight. Pt offers no new complaints; current symptoms resolving    MEDICATIONS  (STANDING):  ALBUTerol/ipratropium for Nebulization 3 milliLiter(s) Nebulizer every 6 hours  aspirin  chewable 81 milliGRAM(s) Oral daily  atorvastatin 20 milliGRAM(s) Oral at bedtime  bisacodyl 5 milliGRAM(s) Oral at bedtime  bisacodyl 5 milliGRAM(s) Oral daily  docusate sodium 100 milliGRAM(s) Oral daily  enoxaparin Injectable 40 milliGRAM(s) SubCutaneous daily  influenza   Vaccine 0.5 milliLiter(s) IntraMuscular once  insulin lispro (HumaLOG) corrective regimen sliding scale   SubCutaneous Before meals and at bedtime  losartan 25 milliGRAM(s) Oral daily  predniSONE   Tablet   Oral   senna 2 Tablet(s) Oral at bedtime    MEDICATIONS  (PRN):  LORazepam     Tablet 2 milliGRAM(s) Oral every 6 hours PRN Anxiety  magnesium hydroxide Suspension 30 milliLiter(s) Oral daily PRN Constipation  zolpidem 5 milliGRAM(s) Oral at bedtime PRN Insomnia      __________________________________________________  REVIEW OF SYSTEMS:    CONSTITUTIONAL: No fever, chills, night sweats  EYES: no acute visual disturbances  NECK: No pain or stiffness  RESPIRATORY: No cough; No shortness of breath  CARDIOVASCULAR: No chest pain, no palpitations  GASTROINTESTINAL: No pain. No nausea or vomiting; No diarrhea  NEUROLOGICAL: No headache or numbness, no tremors  MUSCULOSKELETAL: No joint pain, no muscle pain  GENITOURINARY: no dysuria, no frequency, no hesitancy  PSYCHIATRY: no depression , no anxiety  ALL OTHER ROS negative        Vital Signs Last 24 Hrs  T(C): 36.7 (08 Jan 2018 07:33), Max: 36.7 (08 Jan 2018 07:33)  T(F): 98.1 (08 Jan 2018 07:33), Max: 98.1 (08 Jan 2018 07:33)  HR: 72 (08 Jan 2018 07:33) (65 - 84)  BP: 133/50 (08 Jan 2018 07:33) (113/46 - 135/43)  BP(mean): --  RR: 18 (08 Jan 2018 07:33) (18 - 18)  SpO2: 100% (08 Jan 2018 07:33) (98% - 100%)    ________________________________________________  PHYSICAL EXAM:  GENERAL: NAD. Well appearing, well nourished. Normal mood & affect. Ambulating w/o difficulty.  HEENT: Normocephalic;  conjunctivae and sclerae clear; moist mucous membranes;   NECK : supple  CHEST/LUNG: Clear to auscultation bilaterally with good air entry   HEART: S1 S2  regular; no murmurs, gallops or rubs  ABDOMEN: Soft, Nontender, Nondistended; Bowel sounds present  EXTREMITIES: no cyanosis; no edema; no calf tenderness; peripheral pulses intact  SKIN: warm and dry; no rash  NERVOUS SYSTEM:  Awake and alert; Oriented  to place, person and time ; no new deficits    _________________________________________________  LABS:                        11.4   8.7   )-----------( 219      ( 08 Jan 2018 06:18 )             35.6     01-08    139  |  105  |  35<H>  ----------------------------<  108<H>  3.6   |  27  |  0.83    Ca    8.8      08 Jan 2018 06:18    TPro  6.7  /  Alb  3.1<L>  /  TBili  0.2  /  DBili  x   /  AST  12  /  ALT  25  /  AlkPhos  71  01-08        CAPILLARY BLOOD GLUCOSE      POCT Blood Glucose.: 118 mg/dL (08 Jan 2018 07:45)  POCT Blood Glucose.: 98 mg/dL (07 Jan 2018 20:56)  POCT Blood Glucose.: 286 mg/dL (07 Jan 2018 16:13)        RADIOLOGY & ADDITIONAL TESTS:    Imaging Personally Reviewed:  YES    Consultant(s) Notes Reviewed:   YES    Care Discussed with PGY-2/Attending/Consultants :  YES    Plan of care was discussed with patient and /or primary care giver; all questions and concerns were addressed and care was aligned with patient's wishes. PGY 1 Note discussed with supervising resident and primary attending    Patient is a 89y old  Female who presents with a chief complaint of Shortness of breath with fever and cough. (04 Jan 2018 15:31)      INTERVAL HPI/OVERNIGHT EVENTS: Pt was seen and examined at bedside. No acute events overnight. Pt offers no new complaints; current symptoms resolving. Pt doing well this AM - ready for NST. NST results revealed normal study - pt aware. HHA can be re-instated tomorrow.     MEDICATIONS  (STANDING):  ALBUTerol/ipratropium for Nebulization 3 milliLiter(s) Nebulizer every 6 hours  aspirin  chewable 81 milliGRAM(s) Oral daily  atorvastatin 20 milliGRAM(s) Oral at bedtime  bisacodyl 5 milliGRAM(s) Oral at bedtime  bisacodyl 5 milliGRAM(s) Oral daily  docusate sodium 100 milliGRAM(s) Oral daily  enoxaparin Injectable 40 milliGRAM(s) SubCutaneous daily  influenza   Vaccine 0.5 milliLiter(s) IntraMuscular once  insulin lispro (HumaLOG) corrective regimen sliding scale   SubCutaneous Before meals and at bedtime  losartan 25 milliGRAM(s) Oral daily  predniSONE   Tablet   Oral   senna 2 Tablet(s) Oral at bedtime    MEDICATIONS  (PRN):  LORazepam     Tablet 2 milliGRAM(s) Oral every 6 hours PRN Anxiety  magnesium hydroxide Suspension 30 milliLiter(s) Oral daily PRN Constipation  zolpidem 5 milliGRAM(s) Oral at bedtime PRN Insomnia      __________________________________________________  REVIEW OF SYSTEMS:    CONSTITUTIONAL: No fever, chills, night sweats  RESPIRATORY: No cough; No shortness of breath  CARDIOVASCULAR: No chest pain, no palpitations  GASTROINTESTINAL: No pain. No nausea or vomiting; No diarrhea  ALL OTHER ROS negative        Vital Signs Last 24 Hrs  T(C): 36.7 (08 Jan 2018 07:33), Max: 36.7 (08 Jan 2018 07:33)  T(F): 98.1 (08 Jan 2018 07:33), Max: 98.1 (08 Jan 2018 07:33)  HR: 72 (08 Jan 2018 07:33) (65 - 84)  BP: 133/50 (08 Jan 2018 07:33) (113/46 - 135/43)  BP(mean): --  RR: 18 (08 Jan 2018 07:33) (18 - 18)  SpO2: 100% (08 Jan 2018 07:33) (98% - 100%)    ________________________________________________  PHYSICAL EXAM:  GENERAL: NAD.  HEENT: Normocephalic;  conjunctivae and sclerae clear; moist mucous membranes;   NECK : supple  CHEST/LUNG: Clear to auscultation bilaterally with good air entry   HEART: S1 S2  regular; no murmurs, gallops or rubs  ABDOMEN: Soft, Nontender, Nondistended; Bowel sounds present  EXTREMITIES: no cyanosis; no edema; no calf tenderness; peripheral pulses intact  SKIN: warm and dry; no rash  NERVOUS SYSTEM:  Awake and alert; Oriented  to place, person and time ; no new deficits    _________________________________________________  LABS:                        11.4   8.7   )-----------( 219      ( 08 Jan 2018 06:18 )             35.6     01-08    139  |  105  |  35<H>  ----------------------------<  108<H>  3.6   |  27  |  0.83    Ca    8.8      08 Jan 2018 06:18    TPro  6.7  /  Alb  3.1<L>  /  TBili  0.2  /  DBili  x   /  AST  12  /  ALT  25  /  AlkPhos  71  01-08        CAPILLARY BLOOD GLUCOSE      POCT Blood Glucose.: 118 mg/dL (08 Jan 2018 07:45)  POCT Blood Glucose.: 98 mg/dL (07 Jan 2018 20:56)  POCT Blood Glucose.: 286 mg/dL (07 Jan 2018 16:13)      < from: Nuclear Stress Test-Pharmacologic (01.08.18 @ 10:30) >  IMPRESSIONS:Normal Study  * Negative ECG evidence of ischemia after IV of Lexiscan.  * Review of raw data shows: The study is of good technical  quality.  * The left ventricle was normal in size. Normal myocardial  perfusion scan, with no evidence of infarction or  inducible ischemia.  * Post-stress resting myocardial perfusion gated SPECT  imaging was performed (LVEF > 70%)    < end of copied text >    Consultant(s) Notes Reviewed:   YES    Care Discussed with PGY-2/Attending/Consultants :  YES    Plan of care was discussed with patient and /or primary care giver; all questions and concerns were addressed and care was aligned with patient's wishes.

## 2018-01-08 NOTE — PROGRESS NOTE ADULT - PROBLEM SELECTOR PLAN 5
BP stable  c/w Norvasc. Holding HCTZ and losartan secondary to elevated lactate likely secondary to dehydration.  Monitor BP  DASH/low carb diet
BP stable  c/w cozaar 25 daily  Monitor BP  DASH/low carb diet
BP stable  d/c Norvasc  start cozaar 25 daily  Monitor BP  DASH/low carb diet
BP stable  c/w cozaar 25 daily  Monitor BP  DASH/low carb diet

## 2018-01-08 NOTE — PROGRESS NOTE ADULT - PROBLEM SELECTOR PROBLEM 2
UTI (urinary tract infection)

## 2018-01-08 NOTE — PROGRESS NOTE ADULT - PROBLEM SELECTOR PLAN 6
IMPROVE VTE Individual Risk Assessment          RISK                                                          Points    [  ] Previous VTE                                                3  [  ] Thrombophilia                                             2  [  ] Lower limb paralysis                                    2        (unable to hold up >15 seconds)    [  ] Current Cancer                                             2         (within 6 months)  [x] Immobilization > 24 hrs                              1  [  ] ICU/CCU stay > 24 hours                            1  [x] Age > 60                                                    1    IMPROVE VTE Score 2  Lovenox for DVT ppx  GI ppx not indicated
IMPROVE VTE Individual Risk Assessment          RISK                                                          Points    [  ] Previous VTE                                                3  [  ] Thrombophilia                                             2  [  ] Lower limb paralysis                                    2        (unable to hold up >15 seconds)    [  ] Current Cancer                                             2         (within 6 months)  [x] Immobilization > 24 hrs                              1  [  ] ICU/CCU stay > 24 hours                            1  [x] Age > 60                                                    1    IMPROVE VTE Score 2  Lovenox for DVT ppx  GI ppx not indicated
IMPROVE VTE Individual Risk Assessment          RISK                                                          Points    [  ] Previous VTE                                                3  [  ] Thrombophilia                                             2  [  ] Lower limb paralysis                                    2        (unable to hold up >15 seconds)    [  ] Current Cancer                                             2         (within 6 months)  [x] Immobilization > 24 hrs                              1  [  ] ICU/CCU stay > 24 hours                            1  [x] Age > 60                                                    1    IMPROVE VTE Score 2  Lovenox for DVt  GI ppx not indicated
IMPROVE VTE Individual Risk Assessment          RISK                                                          Points    [  ] Previous VTE                                                3  [  ] Thrombophilia                                             2  [  ] Lower limb paralysis                                    2        (unable to hold up >15 seconds)    [  ] Current Cancer                                             2         (within 6 months)  [x] Immobilization > 24 hrs                              1  [  ] ICU/CCU stay > 24 hours                            1  [x] Age > 60                                                    1    IMPROVE VTE Score 2  Lovenox for DVT ppx  GI ppx not indicated

## 2018-01-08 NOTE — PROGRESS NOTE ADULT - PROBLEM SELECTOR PLAN 1
Likely secondary to Asthma exacerbation secondary to Bronchopneumonia.   RVP negative.   c/w PO Pred taper  CT Chest w/o Cont - No evidence of pneumonia. Likely secondary to Asthma exacerbation secondary to Bronchopneumonia.   RVP negative.   c/w PO Pred taper  CT Chest w/o Cont - No evidence of pneumonia.    DISPO PLANNING: ANTICIPATE D/C TUESDAY 1/9 ONCE HHA CAN BE RE-INSTATED

## 2018-01-08 NOTE — PROGRESS NOTE ADULT - PROBLEM SELECTOR PLAN 4
A1c 6.4  HSS  AccuCheck  DASH/low carb diet

## 2018-01-08 NOTE — PROGRESS NOTE ADULT - PROBLEM SELECTOR PROBLEM 1
Moderate persistent asthma with exacerbation

## 2018-01-08 NOTE — PROGRESS NOTE ADULT - SUBJECTIVE AND OBJECTIVE BOX
CHIEF COMPLAINT:Patient is a 89y old  Female who presents with a chief complaint of Shortness of breath with fever and cough. Pt appears comfortable.    	  REVIEW OF SYSTEMS:  CONSTITUTIONAL: No fever, weight loss, or fatigue  EYES: No eye pain, visual disturbances, or discharge  ENT:  No difficulty hearing, tinnitus, vertigo; No sinus or throat pain  NECK: No pain or stiffness  RESPIRATORY: No cough, wheezing, chills or hemoptysis; No Shortness of Breath  CARDIOVASCULAR: No chest pain, palpitations, passing out, dizziness, or leg swelling  GASTROINTESTINAL: No abdominal or epigastric pain. No nausea, vomiting, or hematemesis; No diarrhea or constipation. No melena or hematochezia.  GENITOURINARY: No dysuria, frequency, hematuria, or incontinence  NEUROLOGICAL: No headaches, memory loss, loss of strength, numbness, or tremors  SKIN: No itching, burning, rashes, or lesions   LYMPH Nodes: No enlarged glands  ENDOCRINE: No heat or cold intolerance; No hair loss  MUSCULOSKELETAL: No joint pain or swelling; No muscle, back, or extremity pain  PSYCHIATRIC: No depression, anxiety, mood swings, or difficulty sleeping  HEME/LYMPH: No easy bruising, or bleeding gums  ALLERGY AND IMMUNOLOGIC: No hives or eczema	      PHYSICAL EXAM:  T(C): 36.7 (01-08-18 @ 07:33), Max: 36.7 (01-08-18 @ 07:33)  HR: 72 (01-08-18 @ 07:33) (62 - 84)  BP: 133/50 (01-08-18 @ 07:33) (113/46 - 135/43)  RR: 18 (01-08-18 @ 07:33) (18 - 18)  SpO2: 100% (01-08-18 @ 07:33) (98% - 100%)  Wt(kg): --  I&O's Summary      Appearance: Normal	  HEENT:   Normal oral mucosa, PERRL, EOMI	  Lymphatic: No lymphadenopathy  Cardiovascular: Normal S1 S2, No JVD, No murmurs, No edema  Respiratory: Lungs clear to auscultation	  Psychiatry: A & O x 3, Mood & affect appropriate  Gastrointestinal:  Soft, Non-tender, + BS	  Skin: No rashes, No ecchymoses, No cyanosis	  Neurologic: Non-focal  Extremities: Normal range of motion, No clubbing, cyanosis or edema  Vascular: Peripheral pulses palpable 2+ bilaterally    MEDICATIONS  (STANDING):  ALBUTerol/ipratropium for Nebulization 3 milliLiter(s) Nebulizer every 6 hours  aspirin  chewable 81 milliGRAM(s) Oral daily  atorvastatin 20 milliGRAM(s) Oral at bedtime  bisacodyl 5 milliGRAM(s) Oral at bedtime  bisacodyl 5 milliGRAM(s) Oral daily  docusate sodium 100 milliGRAM(s) Oral daily  enoxaparin Injectable 40 milliGRAM(s) SubCutaneous daily  influenza   Vaccine 0.5 milliLiter(s) IntraMuscular once  insulin lispro (HumaLOG) corrective regimen sliding scale   SubCutaneous Before meals and at bedtime  losartan 25 milliGRAM(s) Oral daily  predniSONE   Tablet   Oral   senna 2 Tablet(s) Oral at bedtime      	  LABS:	 	                      11.4   8.7   )-----------( 219      ( 08 Jan 2018 06:18 )             35.6     01-08    139  |  105  |  35<H>  ----------------------------<  108<H>  3.6   |  27  |  0.83    Ca    8.8      08 Jan 2018 06:18    TPro  6.7  /  Alb  3.1<L>  /  TBili  0.2  /  DBili  x   /  AST  12  /  ALT  25  /  AlkPhos  71  01-08    proBNP: Serum Pro-Brain Natriuretic Peptide: 173 pg/mL (01-03 @ 10:37)  Serum Pro-Brain Natriuretic Peptide: 34556 pg/mL (01-02 @ 23:11)    Lipid Profile: Cholesterol 218    HDL 89  TG 70    HgA1c: Hemoglobin A1C, Whole Blood: 6.4 % (01-04 @ 09:29)  Hemoglobin A1C, Whole Blood: 6.4 % (01-03 @ 09:46)    TSH: Thyroid Stimulating Hormone, Serum: 0.84 uU/mL (01-04 @ 07:53)

## 2018-01-08 NOTE — PROGRESS NOTE ADULT - PROBLEM SELECTOR PROBLEM 3
R/O CHF (congestive heart failure)

## 2018-01-08 NOTE — PROGRESS NOTE ADULT - PROBLEM SELECTOR PLAN 3
Cardiology Dr. Patel  ECHO EF 55%  abnormal EKG... borderline 1 elevated troponin  NST Monday AM Cardiology Dr. Patel  ECHO EF 55%  abnormal EKG... borderline 1 elevated troponin  NST normal study

## 2018-01-08 NOTE — PROGRESS NOTE ADULT - SUBJECTIVE AND OBJECTIVE BOX
_________________________________________________________________________________________  ========>>  M E D I C A L   A T T E N D I N G    F O L L O W  U P  N O T E  <<=========  -----------------------------------------------------------------------------------------------------    - Patient seen and examined by me earlier today.  - In summary, patient is a 89y year old woman who originally presented with SOB.  - Patient today overall doing ok, comfortable, eating OK. overall feels better    ==================>> REVIEW OF SYSTEM <<=================    GEN: no fever, no chills, no pain  RESP: no SOB, no cough , no sputum  CVS: no chest pain, no palpitations, no edema  GI: no abdominal pain, no nausea,  constipation resolved  : no dysuria, no frequency, no hematuria  Neuro: ? headache /heaviness at times, no dizziness  Derm : no itching, no rash    ==================>> PHYSICAL EXAM <<=================    GEN: A&O X 3 , NAD , comfortable, appears younger than stated age, in chair   HEENT: NCAT, PERRL, MMM, hearing intact  Neck: supple , no JVD  CVS: S1S2 , regular , No M/R/G appreciated  PULM: CTA B/L,  no W/R/R appreciated  ABD.: soft. non tender, non distended,  bowel sounds present  Extrem: intact pulses , no edema   PSYCH : normal mood,  not anxious       ==================>> MEDICATIONS <<====================    ALBUTerol/ipratropium for Nebulization 3 milliLiter(s) Nebulizer every 6 hours  aspirin  chewable 81 milliGRAM(s) Oral daily  atorvastatin 20 milliGRAM(s) Oral at bedtime  bisacodyl 5 milliGRAM(s) Oral at bedtime  bisacodyl 5 milliGRAM(s) Oral daily  docusate sodium 100 milliGRAM(s) Oral daily  enoxaparin Injectable 40 milliGRAM(s) SubCutaneous daily  insulin lispro (HumaLOG) corrective regimen sliding scale   SubCutaneous Before meals and at bedtime  losartan 25 milliGRAM(s) Oral daily  predniSONE   Tablet   Oral   senna 2 Tablet(s) Oral at bedtime    MEDICATIONS  (PRN):  LORazepam     Tablet 2 milliGRAM(s) Oral every 6 hours PRN Anxiety  magnesium hydroxide Suspension 30 milliLiter(s) Oral daily PRN Constipation  zolpidem 5 milliGRAM(s) Oral at bedtime PRN Insomnia    ==================>> VITAL SIGNS <<==================    Vital Signs Last 24 Hrs  T(C): 36.9 (01-08-18 @ 15:36)  T(F): 98.4 (01-08-18 @ 15:36), Max: 98.4 (01-08-18 @ 15:36)  HR: 87 (01-08-18 @ 15:36) (65 - 87)  BP: 147/50 (01-08-18 @ 15:36)  BP(mean): --  RR: 17 (01-08-18 @ 15:36) (17 - 18)  SpO2: 100% (01-08-18 @ 15:36) (98% - 100%)    CAPILLARY BLOOD GLUCOSE      POCT Blood Glucose.: 151 mg/dL (08 Jan 2018 16:05)  POCT Blood Glucose.: 118 mg/dL (08 Jan 2018 07:45)  POCT Blood Glucose.: 98 mg/dL (07 Jan 2018 20:56)     ==================>> LAB AND IMAGING <<==================                        11.4   8.7   )-----------( 219      ( 08 Jan 2018 06:18 )             35.6        01-08    139  |  105  |  35<H>  ----------------------------<  108<H>  3.6   |  27  |  0.83    Ca    8.8      08 Jan 2018 06:18    TPro  6.7  /  Alb  3.1<L>  /  TBili  0.2  /  DBili  x   /  AST  12  /  ALT  25  /  AlkPhos  71  01-08    < from: Nuclear Stress Test-Pharmacologic (01.08.18 @ 10:30) >  IMPRESSIONS:Normal Study  * Negative ECG evidence of ischemia after IV of Lexiscan.  * Review of raw data shows: The study is of good technical  quality.  * The left ventricle was normal in size. Normal myocardial  perfusion scan, with no evidence of infarction or  inducible ischemia.  * Post-stress resting myocardial perfusion gated SPECT  imaging was performed (LVEF > 70%)    < end of copied text >    ___________________________________________________________________________________  ===============>>  A S S E S S M E N T   A N D   P L A N <<===============  ------------------------------------------------------------------------------------------    · Assessment		  89 F from home walks with walker with PMH of Asthma, DM and Htn presented with shortness of breath associated with subjective fever and productive cough producing yellow phlegm.    Problem/Plan - 1:  ·  Problem: Moderate persistent asthma with mild exacerbation, possibly due to bronchitis, as no pneumonia on CT scan  RVP negative.   taper Prednisone as ordered    Problem/Plan - 2:  ·  Problem: UTI   -observe off abx    Problem/Plan - 3:  ·  Problem:  NO CHF on echo  BNP elevation likely lab error   cardio appreciated   monitor  stress normal    Problem/Plan - 4:  ·  Problem: Hypertension  -Continue meds as above and monitor   - cardio appreciated    -GI/DVT Prophylaxis.  - Bowel regimen + enema as needed  - Possible discharge planing home with [facility]   --------------------------------------------	  Case discussed with pt, RN, cardio  Education given on plam of care  ___________________________  [address]. [address] Chavez.  Pager: [phone]

## 2018-01-08 NOTE — PROGRESS NOTE ADULT - PROBLEM SELECTOR PLAN 2
UA positive  Lactate still elevated s/p 3L bolus... given h/o CHF... will monitor clinically  c/w PO levaquin DAY 3  UCx contaminated UA positive  Lactate still elevated s/p 3L bolus... given h/o CHF... will monitor clinically  finished levaquin course

## 2018-01-09 VITALS
TEMPERATURE: 98 F | HEART RATE: 92 BPM | OXYGEN SATURATION: 99 % | SYSTOLIC BLOOD PRESSURE: 131 MMHG | RESPIRATION RATE: 16 BRPM | DIASTOLIC BLOOD PRESSURE: 56 MMHG

## 2018-01-09 LAB
GLUCOSE BLDC GLUCOMTR-MCNC: 114 MG/DL — HIGH (ref 70–99)
GLUCOSE BLDC GLUCOMTR-MCNC: 164 MG/DL — HIGH (ref 70–99)

## 2018-01-09 PROCEDURE — 82550 ASSAY OF CK (CPK): CPT

## 2018-01-09 PROCEDURE — 83605 ASSAY OF LACTIC ACID: CPT

## 2018-01-09 PROCEDURE — 87040 BLOOD CULTURE FOR BACTERIA: CPT

## 2018-01-09 PROCEDURE — 97116 GAIT TRAINING THERAPY: CPT

## 2018-01-09 PROCEDURE — 82607 VITAMIN B-12: CPT

## 2018-01-09 PROCEDURE — 80048 BASIC METABOLIC PNL TOTAL CA: CPT

## 2018-01-09 PROCEDURE — 87633 RESP VIRUS 12-25 TARGETS: CPT

## 2018-01-09 PROCEDURE — 84484 ASSAY OF TROPONIN QUANT: CPT

## 2018-01-09 PROCEDURE — 83036 HEMOGLOBIN GLYCOSYLATED A1C: CPT

## 2018-01-09 PROCEDURE — 99285 EMERGENCY DEPT VISIT HI MDM: CPT | Mod: 25

## 2018-01-09 PROCEDURE — 97162 PT EVAL MOD COMPLEX 30 MIN: CPT

## 2018-01-09 PROCEDURE — 84100 ASSAY OF PHOSPHORUS: CPT

## 2018-01-09 PROCEDURE — 90686 IIV4 VACC NO PRSV 0.5 ML IM: CPT

## 2018-01-09 PROCEDURE — 87581 M.PNEUMON DNA AMP PROBE: CPT

## 2018-01-09 PROCEDURE — 83735 ASSAY OF MAGNESIUM: CPT

## 2018-01-09 PROCEDURE — 94640 AIRWAY INHALATION TREATMENT: CPT

## 2018-01-09 PROCEDURE — 84443 ASSAY THYROID STIM HORMONE: CPT

## 2018-01-09 PROCEDURE — 82306 VITAMIN D 25 HYDROXY: CPT

## 2018-01-09 PROCEDURE — 82553 CREATINE MB FRACTION: CPT

## 2018-01-09 PROCEDURE — 80061 LIPID PANEL: CPT

## 2018-01-09 PROCEDURE — 82962 GLUCOSE BLOOD TEST: CPT

## 2018-01-09 PROCEDURE — 93017 CV STRESS TEST TRACING ONLY: CPT

## 2018-01-09 PROCEDURE — 80053 COMPREHEN METABOLIC PANEL: CPT

## 2018-01-09 PROCEDURE — 81001 URINALYSIS AUTO W/SCOPE: CPT

## 2018-01-09 PROCEDURE — 71250 CT THORAX DX C-: CPT

## 2018-01-09 PROCEDURE — 85027 COMPLETE CBC AUTOMATED: CPT

## 2018-01-09 PROCEDURE — 87486 CHLMYD PNEUM DNA AMP PROBE: CPT

## 2018-01-09 PROCEDURE — 78452 HT MUSCLE IMAGE SPECT MULT: CPT

## 2018-01-09 PROCEDURE — 93306 TTE W/DOPPLER COMPLETE: CPT

## 2018-01-09 PROCEDURE — 93005 ELECTROCARDIOGRAM TRACING: CPT

## 2018-01-09 PROCEDURE — 71045 X-RAY EXAM CHEST 1 VIEW: CPT

## 2018-01-09 PROCEDURE — 83880 ASSAY OF NATRIURETIC PEPTIDE: CPT

## 2018-01-09 PROCEDURE — 97110 THERAPEUTIC EXERCISES: CPT

## 2018-01-09 PROCEDURE — 87086 URINE CULTURE/COLONY COUNT: CPT

## 2018-01-09 PROCEDURE — A9502: CPT

## 2018-01-09 PROCEDURE — 87798 DETECT AGENT NOS DNA AMP: CPT

## 2018-01-09 RX ORDER — LOSARTAN POTASSIUM 100 MG/1
1 TABLET, FILM COATED ORAL
Qty: 30 | Refills: 0 | OUTPATIENT
Start: 2018-01-09 | End: 2018-02-07

## 2018-01-09 RX ORDER — LOSARTAN POTASSIUM 100 MG/1
1 TABLET, FILM COATED ORAL
Qty: 0 | Refills: 0 | COMMUNITY

## 2018-01-09 RX ADMIN — Medication 81 MILLIGRAM(S): at 12:39

## 2018-01-09 RX ADMIN — Medication 5 MILLIGRAM(S): at 12:39

## 2018-01-09 RX ADMIN — Medication 2: at 12:40

## 2018-01-09 RX ADMIN — Medication 100 MILLIGRAM(S): at 12:39

## 2018-01-09 RX ADMIN — Medication 3 MILLILITER(S): at 08:50

## 2018-01-09 RX ADMIN — Medication 5 MILLIGRAM(S): at 06:21

## 2018-01-09 RX ADMIN — LOSARTAN POTASSIUM 25 MILLIGRAM(S): 100 TABLET, FILM COATED ORAL at 06:21

## 2018-01-09 RX ADMIN — Medication 3 MILLILITER(S): at 02:50

## 2018-01-09 NOTE — PROGRESS NOTE ADULT - SUBJECTIVE AND OBJECTIVE BOX
CHIEF COMPLAINT:Patient is a 89y old  Female who presents with a chief complaint of Shortness of breath with fever and cough. (04 Jan 2018 15:31)    	  REVIEW OF SYSTEMS:  CONSTITUTIONAL: No fever, weight loss, or fatigue  EYES: No eye pain, visual disturbances, or discharge  ENT:  No difficulty hearing, tinnitus, vertigo; No sinus or throat pain  NECK: No pain or stiffness  RESPIRATORY: No cough, wheezing, chills or hemoptysis; No Shortness of Breath  CARDIOVASCULAR: No chest pain, palpitations, passing out, dizziness, or leg swelling  GASTROINTESTINAL: No abdominal or epigastric pain. No nausea, vomiting, or hematemesis; No diarrhea or constipation. No melena or hematochezia.  GENITOURINARY: No dysuria, frequency, hematuria, or incontinence  NEUROLOGICAL: No headaches, memory loss, loss of strength, numbness, or tremors  SKIN: No itching, burning, rashes, or lesions   LYMPH Nodes: No enlarged glands  ENDOCRINE: No heat or cold intolerance; No hair loss  MUSCULOSKELETAL: No joint pain or swelling; No muscle, back, or extremity pain  PSYCHIATRIC: No depression, anxiety, mood swings, or difficulty sleeping  HEME/LYMPH: No easy bruising, or bleeding gums  ALLERGY AND IMMUNOLOGIC: No hives or eczema	        PHYSICAL EXAM:  T(C): 36.5 (01-09-18 @ 07:39), Max: 36.9 (01-08-18 @ 15:36)  HR: 73 (01-09-18 @ 07:39) (64 - 87)  BP: 123/41 (01-09-18 @ 07:39) (120/45 - 147/50)  RR: 16 (01-09-18 @ 07:39) (16 - 17)  SpO2: 98% (01-09-18 @ 07:39) (98% - 100%)      Appearance: Normal	  HEENT:   Normal oral mucosa, PERRL, EOMI	  Lymphatic: No lymphadenopathy  Cardiovascular: Normal S1 S2, No JVD, No murmurs, No edema  Respiratory: Lungs clear to auscultation	  Psychiatry: A & O x 3, Mood & affect appropriate  Gastrointestinal:  Soft, Non-tender, + BS	  Skin: No rashes, No ecchymoses, No cyanosis	  Neurologic: Non-focal  Extremities: Normal range of motion, No clubbing, cyanosis or edema  Vascular: Peripheral pulses palpable 2+ bilaterally    MEDICATIONS  (STANDING):  ALBUTerol/ipratropium for Nebulization 3 milliLiter(s) Nebulizer every 6 hours  aspirin  chewable 81 milliGRAM(s) Oral daily  atorvastatin 20 milliGRAM(s) Oral at bedtime  bisacodyl 5 milliGRAM(s) Oral at bedtime  bisacodyl 5 milliGRAM(s) Oral daily  docusate sodium 100 milliGRAM(s) Oral daily  enoxaparin Injectable 40 milliGRAM(s) SubCutaneous daily  insulin lispro (HumaLOG) corrective regimen sliding scale   SubCutaneous Before meals and at bedtime  losartan 25 milliGRAM(s) Oral daily  predniSONE   Tablet   Oral   predniSONE   Tablet 5 milliGRAM(s) Oral daily  senna 2 Tablet(s) Oral at bedtime      TELEMETRY: 	    ECG:  	  RADIOLOGY:  OTHER: 	  	  LABS:	 	    CARDIAC MARKERS:                                11.4   8.7   )-----------( 219      ( 08 Jan 2018 06:18 )             35.6     01-08    139  |  105  |  35<H>  ----------------------------<  108<H>  3.6   |  27  |  0.83    Ca    8.8      08 Jan 2018 06:18    TPro  6.7  /  Alb  3.1<L>  /  TBili  0.2  /  DBili  x   /  AST  12  /  ALT  25  /  AlkPhos  71  01-08    proBNP: Serum Pro-Brain Natriuretic Peptide: 173 pg/mL (01-03 @ 10:37)  Serum Pro-Brain Natriuretic Peptide: 39832 pg/mL (01-02 @ 23:11)    Lipid Profile: Cholesterol 218    HDL 89  TG 70    HgA1c: Hemoglobin A1C, Whole Blood: 6.4 % (01-04 @ 09:29)  Hemoglobin A1C, Whole Blood: 6.4 % (01-03 @ 09:46)    TSH: Thyroid Stimulating Hormone, Serum: 0.84 uU/mL (01-04 @ 07:53)    IMPRESSIONS:Normal Study  * Negative ECG evidence of ischemia after IV of Lexiscan.  * Review of raw data shows: The study is of good technical  quality.  * The left ventricle was normal in size. Normal myocardial  perfusion scan, with no evidence of infarction or  inducible ischemia.  * Post-stress resting myocardial perfusion gated SPECT  imaging was performed (LVEF > 70%)

## 2018-01-09 NOTE — PROGRESS NOTE ADULT - PROVIDER SPECIALTY LIST ADULT
Cardiology
Internal Medicine
Cardiology
Internal Medicine
Internal Medicine

## 2018-01-09 NOTE — PROGRESS NOTE ADULT - ASSESSMENT
89 F from home walks with walker with PMH of Asthma, DM and Htn presented with shortness of breath associated with subjective fever and productive cough producing yellow phlegm.
89 F from home walks with walker with PMH of Asthma, DM and Htn presented with shortness of breath associated with subjective fever and productive cough producing yellow phlegm.
89 yr old  Female  from home walks with walker with PMH of Asthma, DM and HTN presented with shortness of breath associated with subjective fever and productive cough producing yellow phlegm, abnormal EKG, borderline troponins.  1.Tele monitoring.  2.Asthma-ABX and steroids.  3.Cont cozaar 25mg qd.  4.DM-insulin.  5.GI and DVT prophylaxis.
89 F from home walks with walker with PMH of Asthma, DM and Htn presented with shortness of breath associated with subjective fever and productive cough producing yellow phlegm.
89 yr old  Female  from home walks with walker with PMH of Asthma, DM and HTN presented with shortness of breath associated with subjective fever and productive cough producing yellow phlegm, abnormal EKG, borderline troponins.  1.Tele monitoring.  2.Asthma-ABX and steroids.  3.Cont cozaar 25mg qd.  4.DM-insulin.  5.Stress test r/o ischemia.  6.GI and DVT prophylaxis.
89 yr old  Female  from home walks with walker with PMH of Asthma, DM and HTN presented with shortness of breath associated with subjective fever and productive cough producing yellow phlegm, abnormal EKG, borderline troponins.  1.Tele monitoring.  2.Asthma-ABX and steroids.  3.D/C norvasc, add cozaar 25mg qd.  4.DM-insulin.  5.Stress test r/o ischemia when stable.  6.GI and DVT prophylaxis.
89 F from home walks with walker with PMH of Asthma, DM and Htn presented with shortness of breath associated with subjective fever and productive cough producing yellow phlegm.

## 2018-11-09 VITALS — WEIGHT: 163.58 LBS

## 2018-11-09 NOTE — DISCHARGE NOTE ADULT - PLAN OF CARE
Rate control, medical management You presented to the hospital with a cough but you were found to have atrial fibrillation, new onset, with rapid ventricular response. Your troponins were normal. You were started on medications and treated with amiodarone loading doses. Your echo showed some diastolic dysfunction of your heart. Continue your medications as prescribed. Follow up with your primary doctor and cardiologist within one week. You have a history of asthma, and you presented with a cough. You had an asthma exacerbation likely secondary due a bronchitis. Your chest xray was negative for pneumonia and your viral panel was negative. You received cough syrup and steroids and improved. Follow up with your primary doctor. You have a history of diabetes. Your A1C is 6.6. Please continue taking your medication as prescribed. Eat a healthy diet low in sugar and fat and try to exercise as tolerated. Closely monitor your blood sugars. Follow up with your primary doctor again in 3 months for your A1C to be rechecked. You have a history of hypertension. Your BP has been controlled. Continue taking your medications as prescribed. Follow up with your primary doctor within one week. You presented to the hospital with a cough but you were found to have atrial fibrillation, new onset, with rapid ventricular response. Your troponins were normal. You were started on medications and treated with amiodarone loading doses. Your echo showed some diastolic dysfunction of your heart. Continue your medications as prescribed. Follow up with your primary doctor and cardiologist within one week. You are being discharged on amiodarone so check thyroid function test and liver function test every 3month, cxr/pft/optho every 1 yr.

## 2018-11-09 NOTE — DISCHARGE NOTE ADULT - NSTOBACCOHOTLINE_GEN_A_CS
[facility] Smokers Quitline (468-UA-BNQTF) [facility] Smokers Quitline (898-QP-YTBGF)

## 2018-11-09 NOTE — DISCHARGE NOTE ADULT - MEDICATION SUMMARY - MEDICATIONS TO TAKE
I will START or STAY ON the medications listed below when I get home from the hospital:    aspirin 81 mg oral tablet, chewable  -- 1 tab(s) by mouth once a day  -- Indication: For Atrial fibrillation    losartan 25 mg oral tablet  -- 1 tab(s) by mouth once a day  -- Indication: For HTN (hypertension)    Eliquis 5 mg oral tablet  -- 5 milligram(s) by mouth 2 times a day   -- Check with your doctor before becoming pregnant.  It is very important that you take or use this exactly as directed.  Do not skip doses or discontinue unless directed by your doctor.  Obtain medical advice before taking any non-prescription drugs as some may affect the action of this medication.    -- Indication: For Atrial fibrillation    metFORMIN 500 mg oral tablet, extended release  -- 1 tab(s) by mouth once a day  -- Indication: For DM (diabetes mellitus)    atorvastatin 20 mg oral tablet  -- 1 tab(s) by mouth once a day (at bedtime)  -- Indication: For Hyperlipidemia    benzonatate 100 mg oral capsule  -- 1 cap(s) by mouth 3 times a day  -- Indication: For Cough    amLODIPine 10 mg oral tablet  -- 1 tab(s) by mouth once a day  -- Indication: For HTN (hypertension)    Colace 100 mg oral capsule  -- 1 cap(s) by mouth 2 times a day  -- Indication: For Constipation    montelukast 10 mg oral tablet  -- 1 tab(s) by mouth once a day  -- Indication: For Asthma    esomeprazole 40 mg oral delayed release capsule  -- 1 cap(s) by mouth once a day  -- Indication: For Need for prophylactic measure    cholecalciferol oral tablet  -- 1000 unit(s) by mouth once a day  -- Indication: For Need for prophylactic measure I will START or STAY ON the medications listed below when I get home from the hospital:    aspirin 81 mg oral tablet, chewable  -- 1 tab(s) by mouth once a day  -- Indication: For Need for prophylactic measure    losartan 25 mg oral tablet  -- 1 tab(s) by mouth once a day  -- Indication: For HTN (hypertension)    amiodarone 200 mg oral tablet  -- 1 tab(s) by mouth once a day   -- Avoid grapefruit and grapefruit juice while taking this medication.  Avoid prolonged or excessive exposure to direct and/or artificial sunlight while taking this medication.  Do not take this drug if you are pregnant.  It is very important that you take or use this exactly as directed.  Do not skip doses or discontinue unless directed by your doctor.  May cause drowsiness or dizziness.    -- Indication: For Atrial fibrillation    Eliquis 5 mg oral tablet  -- 5 milligram(s) by mouth 2 times a day   -- Check with your doctor before becoming pregnant.  It is very important that you take or use this exactly as directed.  Do not skip doses or discontinue unless directed by your doctor.  Obtain medical advice before taking any non-prescription drugs as some may affect the action of this medication.    -- Indication: For Atrial fibrillation    metFORMIN 500 mg oral tablet, extended release  -- 1 tab(s) by mouth once a day  -- Indication: For DM (diabetes mellitus)    atorvastatin 20 mg oral tablet  -- 1 tab(s) by mouth once a day (at bedtime)  -- Indication: For Hyperlipidemia    benzonatate 100 mg oral capsule  -- 1 cap(s) by mouth 3 times a day  -- Indication: For Cough    Colace 100 mg oral capsule  -- 1 cap(s) by mouth 2 times a day  -- Indication: For Constipation    montelukast 10 mg oral tablet  -- 1 tab(s) by mouth once a day  -- Indication: For Asthma    esomeprazole 40 mg oral delayed release capsule  -- 1 cap(s) by mouth once a day  -- Indication: For Need for prophylactic measure    cholecalciferol oral tablet  -- 1000 unit(s) by mouth once a day  -- Indication: For Need for prophylactic measure

## 2018-11-09 NOTE — DISCHARGE NOTE ADULT - MEDICATION SUMMARY - MEDICATIONS TO STOP TAKING
I will STOP taking the medications listed below when I get home from the hospital:  None I will STOP taking the medications listed below when I get home from the hospital:    amLODIPine 10 mg oral tablet  -- 1 tab(s) by mouth once a day

## 2018-11-09 NOTE — DISCHARGE NOTE ADULT - SECONDARY DIAGNOSIS.
Moderate asthma with acute exacerbation, unspecified whether persistent DM (diabetes mellitus) HTN (hypertension)

## 2018-11-09 NOTE — DISCHARGE NOTE ADULT - PATIENT PORTAL LINK FT
You can access the [facility] Patient Portal, offered by [facility], by registering with the following website: [email or web address]

## 2018-11-09 NOTE — DISCHARGE NOTE ADULT - CARE PLAN
Principal Discharge DX:	Atrial fibrillation  Goal:	Rate control, medical management  Assessment and plan of treatment:	You presented to the hospital with a cough but you were found to have atrial fibrillation, new onset, with rapid ventricular response. Your troponins were normal. You were started on medications and treated with amiodarone loading doses. Your echo showed some diastolic dysfunction of your heart. Continue your medications as prescribed. Follow up with your primary doctor and cardiologist within one week.  Secondary Diagnosis:	Moderate asthma with acute exacerbation, unspecified whether persistent  Assessment and plan of treatment:	You have a history of asthma, and you presented with a cough. You had an asthma exacerbation likely secondary due a bronchitis. Your chest xray was negative for pneumonia and your viral panel was negative. You received cough syrup and steroids and improved. Follow up with your primary doctor.  Secondary Diagnosis:	DM (diabetes mellitus)  Assessment and plan of treatment:	You have a history of diabetes. Your A1C is 6.6. Please continue taking your medication as prescribed. Eat a healthy diet low in sugar and fat and try to exercise as tolerated. Closely monitor your blood sugars. Follow up with your primary doctor again in 3 months for your A1C to be rechecked.  Secondary Diagnosis:	HTN (hypertension)  Assessment and plan of treatment:	You have a history of hypertension. Your BP has been controlled. Continue taking your medications as prescribed. Follow up with your primary doctor within one week. Principal Discharge DX:	Atrial fibrillation  Goal:	Rate control, medical management  Assessment and plan of treatment:	You presented to the hospital with a cough but you were found to have atrial fibrillation, new onset, with rapid ventricular response. Your troponins were normal. You were started on medications and treated with amiodarone loading doses. Your echo showed some diastolic dysfunction of your heart. Continue your medications as prescribed. Follow up with your primary doctor and cardiologist within one week. You are being discharged on amiodarone so check thyroid function test and liver function test every 3month, cxr/pft/optho every 1 yr.  Secondary Diagnosis:	Moderate asthma with acute exacerbation, unspecified whether persistent  Assessment and plan of treatment:	You have a history of asthma, and you presented with a cough. You had an asthma exacerbation likely secondary due a bronchitis. Your chest xray was negative for pneumonia and your viral panel was negative. You received cough syrup and steroids and improved. Follow up with your primary doctor.  Secondary Diagnosis:	DM (diabetes mellitus)  Assessment and plan of treatment:	You have a history of diabetes. Your A1C is 6.6. Please continue taking your medication as prescribed. Eat a healthy diet low in sugar and fat and try to exercise as tolerated. Closely monitor your blood sugars. Follow up with your primary doctor again in 3 months for your A1C to be rechecked.  Secondary Diagnosis:	HTN (hypertension)  Assessment and plan of treatment:	You have a history of hypertension. Your BP has been controlled. Continue taking your medications as prescribed. Follow up with your primary doctor within one week.

## 2018-11-09 NOTE — DISCHARGE NOTE ADULT - HOSPITAL COURSE
89 y/o F with past hx of DM, HLD, and HTN presents with 2 weeks of cough. Pt states that she went to her PCP one week ago, and received PO antibiotics, has been taking the medication, however has not been feeling better. Patient states she has some epigastric pain as well, worse with coughing, however has continued to feel progressively short of breath, therefore came into ED. Patient denies headache, fever, dizziness, chest pain, palpitations, nausea, vomiting.    RVP and CXR negative. EKG showed new onset afib with rvr, patient admitted to tele. Trop x3 negative. Echo with EF of 55-60% and grade2 diastolic dysfunction. Put on 5 day amiodarone loading. The patient is medically stable for dc. This has been discussed with the attending. Advised patient to f/u with PCP within one week. Please see chart for full hospital course as this is just a brief summary.

## 2018-11-12 ENCOUNTER — INPATIENT (INPATIENT)
Facility: HOSPITAL | Age: 83
LOS: 6 days | Discharge: HOME | DRG: 309 | End: 2018-11-19
Attending: STUDENT IN AN ORGANIZED HEALTH CARE EDUCATION/TRAINING PROGRAM | Admitting: STUDENT IN AN ORGANIZED HEALTH CARE EDUCATION/TRAINING PROGRAM
Payer: MEDICARE

## 2018-11-12 DIAGNOSIS — R07.9 CHEST PAIN, UNSPECIFIED: ICD-10-CM

## 2018-11-12 DIAGNOSIS — E11.9 TYPE 2 DIABETES MELLITUS WITHOUT COMPLICATIONS: ICD-10-CM

## 2018-11-12 DIAGNOSIS — R05 COUGH: ICD-10-CM

## 2018-11-12 DIAGNOSIS — I48.91 UNSPECIFIED ATRIAL FIBRILLATION: ICD-10-CM

## 2018-11-12 DIAGNOSIS — J45.901 UNSPECIFIED ASTHMA WITH (ACUTE) EXACERBATION: ICD-10-CM

## 2018-11-12 DIAGNOSIS — Z29.9 ENCOUNTER FOR PROPHYLACTIC MEASURES, UNSPECIFIED: ICD-10-CM

## 2018-11-12 DIAGNOSIS — I10 ESSENTIAL (PRIMARY) HYPERTENSION: ICD-10-CM

## 2018-11-12 LAB
ALBUMIN SERPL ELPH-MCNC: 3.8 G/DL — SIGNIFICANT CHANGE UP (ref 3.5–5)
ALP SERPL-CCNC: 86 U/L — SIGNIFICANT CHANGE UP (ref 40–120)
ALT FLD-CCNC: 26 U/L DA — SIGNIFICANT CHANGE UP (ref 10–60)
ANION GAP SERPL CALC-SCNC: 12 MMOL/L — SIGNIFICANT CHANGE UP (ref 5–17)
AST SERPL-CCNC: 20 U/L — SIGNIFICANT CHANGE UP (ref 10–40)
BASOPHILS # BLD AUTO: 0.1 K/UL — SIGNIFICANT CHANGE UP (ref 0–0.2)
BASOPHILS NFR BLD AUTO: 1.2 % — SIGNIFICANT CHANGE UP (ref 0–2)
BILIRUB SERPL-MCNC: 0.3 MG/DL — SIGNIFICANT CHANGE UP (ref 0.2–1.2)
BUN SERPL-MCNC: 15 MG/DL — SIGNIFICANT CHANGE UP (ref 7–18)
CALCIUM SERPL-MCNC: 9.5 MG/DL — SIGNIFICANT CHANGE UP (ref 8.4–10.5)
CHLORIDE SERPL-SCNC: 104 MMOL/L — SIGNIFICANT CHANGE UP (ref 96–108)
CK SERPL-CCNC: 44 U/L — SIGNIFICANT CHANGE UP (ref 21–215)
CO2 SERPL-SCNC: 25 MMOL/L — SIGNIFICANT CHANGE UP (ref 22–31)
CREAT SERPL-MCNC: 0.84 MG/DL — SIGNIFICANT CHANGE UP (ref 0.5–1.3)
EOSINOPHIL # BLD AUTO: 0.2 K/UL — SIGNIFICANT CHANGE UP (ref 0–0.5)
EOSINOPHIL NFR BLD AUTO: 2.7 % — SIGNIFICANT CHANGE UP (ref 0–6)
GLUCOSE SERPL-MCNC: 107 MG/DL — HIGH (ref 70–99)
HCT VFR BLD CALC: 40.2 % — SIGNIFICANT CHANGE UP (ref 34.5–45)
HGB BLD-MCNC: 12.6 G/DL — SIGNIFICANT CHANGE UP (ref 11.5–15.5)
LYMPHOCYTES # BLD AUTO: 2 K/UL — SIGNIFICANT CHANGE UP (ref 1–3.3)
LYMPHOCYTES # BLD AUTO: 34.2 % — SIGNIFICANT CHANGE UP (ref 13–44)
MCHC RBC-ENTMCNC: 25.2 PG — LOW (ref 27–34)
MCHC RBC-ENTMCNC: 31.2 GM/DL — LOW (ref 32–36)
MCV RBC AUTO: 80.5 FL — SIGNIFICANT CHANGE UP (ref 80–100)
MONOCYTES # BLD AUTO: 0.5 K/UL — SIGNIFICANT CHANGE UP (ref 0–0.9)
MONOCYTES NFR BLD AUTO: 9 % — SIGNIFICANT CHANGE UP (ref 2–14)
NEUTROPHILS # BLD AUTO: 3.1 K/UL — SIGNIFICANT CHANGE UP (ref 1.8–7.4)
NEUTROPHILS NFR BLD AUTO: 52.8 % — SIGNIFICANT CHANGE UP (ref 43–77)
NT-PROBNP SERPL-SCNC: 66 PG/ML — SIGNIFICANT CHANGE UP (ref 0–450)
PLATELET # BLD AUTO: 189 K/UL — SIGNIFICANT CHANGE UP (ref 150–400)
POTASSIUM SERPL-MCNC: 4 MMOL/L — SIGNIFICANT CHANGE UP (ref 3.5–5.3)
POTASSIUM SERPL-SCNC: 4 MMOL/L — SIGNIFICANT CHANGE UP (ref 3.5–5.3)
PROT SERPL-MCNC: 8 G/DL — SIGNIFICANT CHANGE UP (ref 6–8.3)
RAPID RVP RESULT: SIGNIFICANT CHANGE UP
RBC # BLD: 4.99 M/UL — SIGNIFICANT CHANGE UP (ref 3.8–5.2)
RBC # FLD: 13.6 % — SIGNIFICANT CHANGE UP (ref 10.3–14.5)
SODIUM SERPL-SCNC: 141 MMOL/L — SIGNIFICANT CHANGE UP (ref 135–145)
TROPONIN I SERPL-MCNC: 0.02 NG/ML — SIGNIFICANT CHANGE UP (ref 0–0.04)
TROPONIN I SERPL-MCNC: <0.015 NG/ML — SIGNIFICANT CHANGE UP (ref 0–0.04)
WBC # BLD: 5.9 K/UL — SIGNIFICANT CHANGE UP (ref 3.8–10.5)
WBC # FLD AUTO: 5.9 K/UL — SIGNIFICANT CHANGE UP (ref 3.8–10.5)

## 2018-11-12 PROCEDURE — 71046 X-RAY EXAM CHEST 2 VIEWS: CPT | Mod: 26

## 2018-11-12 PROCEDURE — 99285 EMERGENCY DEPT VISIT HI MDM: CPT

## 2018-11-12 PROCEDURE — 99223 1ST HOSP IP/OBS HIGH 75: CPT | Mod: GC,AI

## 2018-11-12 RX ORDER — INSULIN LISPRO 100/ML
VIAL (ML) SUBCUTANEOUS
Qty: 0 | Refills: 0 | Status: DISCONTINUED | OUTPATIENT
Start: 2018-11-12 | End: 2018-11-19

## 2018-11-12 RX ORDER — ATORVASTATIN CALCIUM 80 MG/1
20 TABLET, FILM COATED ORAL AT BEDTIME
Qty: 0 | Refills: 0 | Status: DISCONTINUED | OUTPATIENT
Start: 2018-11-12 | End: 2018-11-19

## 2018-11-12 RX ORDER — PANTOPRAZOLE SODIUM 20 MG/1
40 TABLET, DELAYED RELEASE ORAL
Qty: 0 | Refills: 0 | Status: DISCONTINUED | OUTPATIENT
Start: 2018-11-12 | End: 2018-11-19

## 2018-11-12 RX ORDER — LOSARTAN POTASSIUM 100 MG/1
25 TABLET, FILM COATED ORAL DAILY
Qty: 0 | Refills: 0 | Status: DISCONTINUED | OUTPATIENT
Start: 2018-11-12 | End: 2018-11-19

## 2018-11-12 RX ORDER — AMLODIPINE BESYLATE 2.5 MG/1
10 TABLET ORAL DAILY
Qty: 0 | Refills: 0 | Status: DISCONTINUED | OUTPATIENT
Start: 2018-11-12 | End: 2018-11-13

## 2018-11-12 RX ORDER — ENOXAPARIN SODIUM 100 MG/ML
70 INJECTION SUBCUTANEOUS EVERY 12 HOURS
Qty: 0 | Refills: 0 | Status: DISCONTINUED | OUTPATIENT
Start: 2018-11-12 | End: 2018-11-19

## 2018-11-12 RX ORDER — METFORMIN HYDROCHLORIDE 850 MG/1
1 TABLET ORAL
Qty: 0 | Refills: 0 | COMMUNITY

## 2018-11-12 RX ORDER — MONTELUKAST 4 MG/1
10 TABLET, CHEWABLE ORAL DAILY
Qty: 0 | Refills: 0 | Status: DISCONTINUED | OUTPATIENT
Start: 2018-11-12 | End: 2018-11-19

## 2018-11-12 RX ORDER — DOCUSATE SODIUM 100 MG
100 CAPSULE ORAL
Qty: 0 | Refills: 0 | Status: DISCONTINUED | OUTPATIENT
Start: 2018-11-12 | End: 2018-11-19

## 2018-11-12 RX ORDER — ASPIRIN/CALCIUM CARB/MAGNESIUM 324 MG
81 TABLET ORAL DAILY
Qty: 0 | Refills: 0 | Status: DISCONTINUED | OUTPATIENT
Start: 2018-11-12 | End: 2018-11-19

## 2018-11-12 RX ORDER — IPRATROPIUM/ALBUTEROL SULFATE 18-103MCG
3 AEROSOL WITH ADAPTER (GRAM) INHALATION EVERY 6 HOURS
Qty: 0 | Refills: 0 | Status: DISCONTINUED | OUTPATIENT
Start: 2018-11-12 | End: 2018-11-19

## 2018-11-12 RX ORDER — METOPROLOL TARTRATE 50 MG
12.5 TABLET ORAL
Qty: 0 | Refills: 0 | Status: DISCONTINUED | OUTPATIENT
Start: 2018-11-12 | End: 2018-11-14

## 2018-11-12 RX ORDER — MONTELUKAST 4 MG/1
1 TABLET, CHEWABLE ORAL
Qty: 0 | Refills: 0 | COMMUNITY

## 2018-11-12 RX ORDER — ESOMEPRAZOLE MAGNESIUM 40 MG/1
1 CAPSULE, DELAYED RELEASE ORAL
Qty: 0 | Refills: 0 | COMMUNITY

## 2018-11-12 RX ORDER — DOCUSATE SODIUM 100 MG
1 CAPSULE ORAL
Qty: 0 | Refills: 0 | COMMUNITY

## 2018-11-12 RX ADMIN — Medication 200 MILLIGRAM(S): at 22:12

## 2018-11-12 RX ADMIN — ENOXAPARIN SODIUM 70 MILLIGRAM(S): 100 INJECTION SUBCUTANEOUS at 18:58

## 2018-11-12 RX ADMIN — ATORVASTATIN CALCIUM 20 MILLIGRAM(S): 80 TABLET, FILM COATED ORAL at 22:12

## 2018-11-12 NOTE — H&P ADULT - NSHPLABSRESULTS_GEN_ALL_CORE
CBC Full  -  ( 12 Nov 2018 12:09 )  WBC Count : 5.9 K/uL  Hemoglobin : 12.6 g/dL  Hematocrit : 40.2 %  Platelet Count - Automated : 189 K/uL  Mean Cell Volume : 80.5 fl  Mean Cell Hemoglobin : 25.2 pg  Mean Cell Hemoglobin Concentration : 31.2 gm/dL  Auto Neutrophil # : 3.1 K/uL  Auto Lymphocyte # : 2.0 K/uL  Auto Monocyte # : 0.5 K/uL  Auto Eosinophil # : 0.2 K/uL  Auto Basophil # : 0.1 K/uL  Auto Neutrophil % : 52.8 %  Auto Lymphocyte % : 34.2 %  Auto Monocyte % : 9.0 %  Auto Eosinophil % : 2.7 %  Auto Basophil % : 1.2 %    11-12    141  |  104  |  15  ----------------------------<  107<H>  4.0   |  25  |  0.84    Ca    9.5      12 Nov 2018 12:09    TPro  8.0  /  Alb  3.8  /  TBili  0.3  /  DBili  x   /  AST  20  /  ALT  26  /  AlkPhos  86  11-12    RADIOLOGY & ADDITIONAL STUDIES (The following images were personally reviewed): CBC Full  -  ( 12 Nov 2018 12:09 )  WBC Count : 5.9 K/uL  Hemoglobin : 12.6 g/dL  Hematocrit : 40.2 %  Platelet Count - Automated : 189 K/uL  Mean Cell Volume : 80.5 fl  Mean Cell Hemoglobin : 25.2 pg  Mean Cell Hemoglobin Concentration : 31.2 gm/dL  Auto Neutrophil # : 3.1 K/uL  Auto Lymphocyte # : 2.0 K/uL  Auto Monocyte # : 0.5 K/uL  Auto Eosinophil # : 0.2 K/uL  Auto Basophil # : 0.1 K/uL  Auto Neutrophil % : 52.8 %  Auto Lymphocyte % : 34.2 %  Auto Monocyte % : 9.0 %  Auto Eosinophil % : 2.7 %  Auto Basophil % : 1.2 %    11-12    141  |  104  |  15  ----------------------------<  107<H>  4.0   |  25  |  0.84    Ca    9.5      12 Nov 2018 12:09    TPro  8.0  /  Alb  3.8  /  TBili  0.3  /  DBili  x   /  AST  20  /  ALT  26  /  AlkPhos  86  11-12    RADIOLOGY & ADDITIONAL STUDIES (The following images were personally reviewed):    EXAM:  XR CHEST PA LAT 2V                          PROCEDURE DATE:  11/12/2018      INTERPRETATION:  Cough.    PA lateral. Prior 1/4/2018.    Normal heart size.  Calcified aortic arch. No pleural-parenchymal abnormality. Status post   kyphoplasty. Degenerative change thoracic spine    Impression: No active infiltrates.

## 2018-11-12 NOTE — H&P ADULT - HISTORY OF PRESENT ILLNESS
89 y/o F with past hx of DM, HLD, and HTN presents with 2 weeks of cough. Pt states that she went to her PCP one week ago, and received PO antibiotics, has been taking the medication, however has not been feeling better. Patient states she has some epigastric pain as well, worse with coughing, however has continued to feel progressively short of breath, tehrefore came into ED. Patient denies headache, fever, dizziness, chest pain, palpitations, nausea, vomiting. Patient admits to some diarrhea 3-4 episodes starting yesterday, however denies all other ROS.

## 2018-11-12 NOTE — ED PROVIDER NOTE - CARE PLAN
Principal Discharge DX:	Chest pain  Secondary Diagnosis:	Dizziness  Secondary Diagnosis:	Atrial fibrillation  Secondary Diagnosis:	Cough

## 2018-11-12 NOTE — H&P ADULT - PROBLEM SELECTOR PLAN 1
new onset; patient denies chest pain, however does admit to shortness of breath  c/w full dose lovenox for now  rate control with low dose metoprolol for now  continue to monitor on telemetry  f/u echocardiogram  T1 negative, f/u T2, T3  f/u cardiology consult -  f/u TSH, lipid profile

## 2018-11-12 NOTE — ED ADULT NURSE NOTE - NSIMPLEMENTINTERV_GEN_ALL_ED
Implemented All Fall with Harm Risk Interventions:  [place] to call system. Call bell, personal items and telephone within reach. Instruct patient to call for assistance. Room bathroom lighting operational. Non-slip footwear when patient is off stretcher. Physically safe environment: no spills, clutter or unnecessary equipment. Stretcher in lowest position, wheels locked, appropriate side rails in place. Provide visual cue, wrist band, yellow gown, etc. Monitor gait and stability. Monitor for mental status changes and reorient to person, place, and time. Review medications for side effects contributing to fall risk. Reinforce activity limits and safety measures with patient and family. Provide visual clues: red socks.

## 2018-11-12 NOTE — ED ADULT NURSE NOTE - ED STAT RN HANDOFF DETAILS
Received patient from [facility] admitted to telemetry with NSR, denies any pain . No Bed assigned as yet continue to monitor patient.

## 2018-11-12 NOTE — H&P ADULT - ASSESSMENT
Patient admitted for new onset Afib likely 2/2 acute bronchitis, however will need to rule out ischemic/reversible causes

## 2018-11-12 NOTE — ED PROVIDER NOTE - OBJECTIVE STATEMENT
89 y/o F with past hx of DM, HLD, and HTN presents with 2 weeks of cough. Pt states that she went to her PCP one week ago, received abx, has been taking it, and now has 2 pills left. Pt states that despite taking her abx, her cough is now worsening. 2 days ago, she started feeling more SOB and dizzy. She is also experiencing CP and weakness. No further complaints at this time. 89 y/o F with past hx of DM, HLD, and HTN presents with 2 weeks of cough. Pt states that she went to her PCP one week ago, received abx, has been taking it, and now has 2 pills left. Pt states that despite taking her abx, her cough is now worsening. 2 days ago, she started feeling more shortness of breath and weak. also notes CP and weakness and dizziness for several days, worse with activity. No further complaints at this time.

## 2018-11-12 NOTE — H&P ADULT - PMH
DM (diabetes mellitus)    HTN (hypertension)    Moderate asthma with acute exacerbation, unspecified whether persistent

## 2018-11-12 NOTE — H&P ADULT - NSHPPHYSICALEXAM_GEN_ALL_CORE
Vital Signs Last 24 Hrs  T(C): 36.7 (12 Nov 2018 16:21), Max: 36.7 (12 Nov 2018 16:21)  T(F): 98 (12 Nov 2018 16:21), Max: 98 (12 Nov 2018 16:21)  HR: 89 (12 Nov 2018 16:21) (73 - 89)  BP: 137/72 (12 Nov 2018 16:21) (137/72 - 140/80)  RR: 18 (12 Nov 2018 16:21) (18 - 18)  SpO2: 95% (12 Nov 2018 16:21) (95% - 95%)  ________________________________________________  PHYSICAL EXAM:  GENERAL: NAD  HEENT: Normocephalic;  conjunctivae and sclerae clear; moist mucous membranes;   NECK : supple  CHEST/LUNG: Clear to auscultation bilaterally with good air entry   HEART: S1 S2  regular; no murmurs, gallops or rubs  ABDOMEN: Soft, Nontender, Nondistended; Bowel sounds present  EXTREMITIES: no cyanosis; no edema; no calf tenderness  NERVOUS SYSTEM:  Awake and alert; Oriented  to place, person and time Vital Signs Last 24 Hrs  T(C): 36.7 (12 Nov 2018 16:21), Max: 36.7 (12 Nov 2018 16:21)  T(F): 98 (12 Nov 2018 16:21), Max: 98 (12 Nov 2018 16:21)  HR: 89 (12 Nov 2018 16:21) (73 - 89)  BP: 137/72 (12 Nov 2018 16:21) (137/72 - 140/80)  RR: 18 (12 Nov 2018 16:21) (18 - 18)  SpO2: 95% (12 Nov 2018 16:21) (95% - 95%)  ________________________________________________  PHYSICAL EXAM:  GENERAL: NAD, coughing occasionally  HEENT: Normocephalic;  conjunctivae and sclerae clear; moist mucous membranes;   NECK : supple, no JVD  CHEST/LUNG: Clear to auscultation bilaterally with good air entry   HEART: S1 S2  regular; no murmurs, gallops or rubs  ABDOMEN: Soft, Nontender, Nondistended; Bowel sounds present  EXTREMITIES: no cyanosis; no edema; no calf tenderness  NERVOUS SYSTEM:  Awake and alert; Oriented  to place, person and time

## 2018-11-12 NOTE — H&P ADULT - ATTENDING COMMENTS
Agree with above  Patient seen and examined in ED. She is a 89 y/o F with past hx of DM, HLD, and HTN presents with 2 weeks of cough. Pt states that she went to her PCP one week ago, and received PO antibiotics, has been taking the medication, however has not been feeling better. Patient states she has some epigastric pain as well, worse with coughing, however has continued to feel progressively short of breath, therefore came into ED. Patient denies headache, fever, dizziness, chest pain, palpitations, nausea, vomiting. Patient admits to some diarrhea 3-4 episodes starting yesterday, however denies all other ROS.     ROS as above   PE:   General: Anxious elderly lady, in mild distress  Head and Neck: normocephalic, supple neck   Cardio: Irregular rate and rhythm   Pulm: b/l rales, no wheezing   GI: abdomen is soft, non tender   Extr: no edema, no rash   Neuro: AOx3, no focal weakness     Vital Signs Last 24 Hrs  T(C): 36.6 (12 Nov 2018 21:34), Max: 37.2 (12 Nov 2018 19:19)  T(F): 97.9 (12 Nov 2018 21:34), Max: 99 (12 Nov 2018 19:19)  HR: 66 (12 Nov 2018 21:34) (66 - 91)  BP: 122/42 (12 Nov 2018 21:34) (122/42 - 140/80)  BP(mean): --  RR: 17 (12 Nov 2018 21:34) (17 - 18)  SpO2: 97% (12 Nov 2018 21:34) (95% - 98%)    1. New onselt Afib   Admitted to telemetry   Rate controlled  On Enoxaparin for anticoagulation for now   Can start Eliquis, pending insurance approval   Follow TSH   TTE in AM     2. Acute bronchitis   Afebrile, no leukocytosis   RVP negative   CXR: no infiltrate   Robitussin 200 mg po q6    3. DM: Follow HBA1C  c/w Humalog per sliding scale for now     4. DVT prophylaxis: on Full dose of enoxaparin     Plan of care discussed with patient

## 2018-11-12 NOTE — H&P ADULT - PROBLEM SELECTOR PLAN 6
IMPROVE VTE Individual Risk Assessment          RISK                                                          Points  [  ] Previous VTE                                                3  [ x ] Thrombophilia                                             2  [  ] Lower limb paralysis                                   2        (unable to hold up >15 seconds)    [  ] Current Cancer                                             2         (within 6 months)  [ x ] Immobilization > 24 hrs                              1  [  ] ICU/CCU stay > 24 hours                             1  [ x ] Age > 60                                                         1    IMPROVE VTE Score: 4    c/w lovenox for full anticoagulation in setting of new onset Afib with CHADSVASC score 4 IMPROVE VTE Individual Risk Assessment          RISK                                                          Points  [  ] Previous VTE                                                3  [ x ] Thrombophilia                                             2  [  ] Lower limb paralysis                                   2        (unable to hold up >15 seconds)    [  ] Current Cancer                                             2         (within 6 months)  [ x ] Immobilization > 24 hrs                              1  [  ] ICU/CCU stay > 24 hours                             1  [ x ] Age > 60                                                         1    IMPROVE VTE Score: 4    c/w lovenox for full anticoagulation in setting of new onset Afib with CHADSVASC score 5

## 2018-11-12 NOTE — ED ADULT NURSE REASSESSMENT NOTE - NS ED NURSE REASSESS COMMENT FT1
Pt received axox3 on cardiac monitor no compliant of discomfort no distress noted safety precaution maintained.

## 2018-11-12 NOTE — ED PROVIDER NOTE - MEDICAL DECISION MAKING DETAILS
89 y/o F with cough and malaise - now getting worse despite abx: Will admit for cardiac workup. 89 y/o F with cough and malaise - getting worse despite abx: also notes chest pain and dizziness, EKG reveals new afib.  Will admit for cardiac workup.

## 2018-11-13 LAB
24R-OH-CALCIDIOL SERPL-MCNC: 27.5 NG/ML — LOW (ref 30–80)
ANION GAP SERPL CALC-SCNC: 11 MMOL/L — SIGNIFICANT CHANGE UP (ref 5–17)
BUN SERPL-MCNC: 17 MG/DL — SIGNIFICANT CHANGE UP (ref 7–18)
CALCIUM SERPL-MCNC: 9.5 MG/DL — SIGNIFICANT CHANGE UP (ref 8.4–10.5)
CHLORIDE SERPL-SCNC: 105 MMOL/L — SIGNIFICANT CHANGE UP (ref 96–108)
CHOLEST SERPL-MCNC: 168 MG/DL — SIGNIFICANT CHANGE UP (ref 10–199)
CK MB BLD-MCNC: 2.4 % — SIGNIFICANT CHANGE UP (ref 0–3.5)
CK MB CFR SERPL CALC: 1 NG/ML — SIGNIFICANT CHANGE UP (ref 0–3.6)
CK SERPL-CCNC: 42 U/L — SIGNIFICANT CHANGE UP (ref 21–215)
CO2 SERPL-SCNC: 25 MMOL/L — SIGNIFICANT CHANGE UP (ref 22–31)
CREAT SERPL-MCNC: 0.76 MG/DL — SIGNIFICANT CHANGE UP (ref 0.5–1.3)
GLUCOSE SERPL-MCNC: 140 MG/DL — HIGH (ref 70–99)
HBA1C BLD-MCNC: 6.6 % — HIGH (ref 4–5.6)
HCT VFR BLD CALC: 38.6 % — SIGNIFICANT CHANGE UP (ref 34.5–45)
HDLC SERPL-MCNC: 68 MG/DL — SIGNIFICANT CHANGE UP
HGB BLD-MCNC: 12.1 G/DL — SIGNIFICANT CHANGE UP (ref 11.5–15.5)
LIPID PNL WITH DIRECT LDL SERPL: 63 MG/DL — SIGNIFICANT CHANGE UP
MAGNESIUM SERPL-MCNC: 2.1 MG/DL — SIGNIFICANT CHANGE UP (ref 1.6–2.6)
MCHC RBC-ENTMCNC: 25.4 PG — LOW (ref 27–34)
MCHC RBC-ENTMCNC: 31.4 GM/DL — LOW (ref 32–36)
MCV RBC AUTO: 81 FL — SIGNIFICANT CHANGE UP (ref 80–100)
PHOSPHATE SERPL-MCNC: 4.2 MG/DL — SIGNIFICANT CHANGE UP (ref 2.5–4.5)
PLATELET # BLD AUTO: 207 K/UL — SIGNIFICANT CHANGE UP (ref 150–400)
POTASSIUM SERPL-MCNC: 3.7 MMOL/L — SIGNIFICANT CHANGE UP (ref 3.5–5.3)
POTASSIUM SERPL-SCNC: 3.7 MMOL/L — SIGNIFICANT CHANGE UP (ref 3.5–5.3)
RBC # BLD: 4.76 M/UL — SIGNIFICANT CHANGE UP (ref 3.8–5.2)
RBC # FLD: 13.9 % — SIGNIFICANT CHANGE UP (ref 10.3–14.5)
SODIUM SERPL-SCNC: 141 MMOL/L — SIGNIFICANT CHANGE UP (ref 135–145)
TOTAL CHOLESTEROL/HDL RATIO MEASUREMENT: 2.5 RATIO — LOW (ref 3.3–7.1)
TRIGL SERPL-MCNC: 186 MG/DL — HIGH (ref 10–149)
TROPONIN I SERPL-MCNC: 0.02 NG/ML — SIGNIFICANT CHANGE UP (ref 0–0.04)
TSH SERPL-MCNC: 1.49 UU/ML — SIGNIFICANT CHANGE UP (ref 0.34–4.82)
WBC # BLD: 6.3 K/UL — SIGNIFICANT CHANGE UP (ref 3.8–10.5)
WBC # FLD AUTO: 6.3 K/UL — SIGNIFICANT CHANGE UP (ref 3.8–10.5)

## 2018-11-13 PROCEDURE — 99233 SBSQ HOSP IP/OBS HIGH 50: CPT | Mod: GC

## 2018-11-13 RX ADMIN — ATORVASTATIN CALCIUM 20 MILLIGRAM(S): 80 TABLET, FILM COATED ORAL at 21:57

## 2018-11-13 RX ADMIN — Medication 12.5 MILLIGRAM(S): at 06:58

## 2018-11-13 RX ADMIN — Medication 200 MILLIGRAM(S): at 12:17

## 2018-11-13 RX ADMIN — MONTELUKAST 10 MILLIGRAM(S): 4 TABLET, CHEWABLE ORAL at 12:16

## 2018-11-13 RX ADMIN — ENOXAPARIN SODIUM 70 MILLIGRAM(S): 100 INJECTION SUBCUTANEOUS at 07:46

## 2018-11-13 RX ADMIN — PANTOPRAZOLE SODIUM 40 MILLIGRAM(S): 20 TABLET, DELAYED RELEASE ORAL at 06:58

## 2018-11-13 RX ADMIN — Medication 81 MILLIGRAM(S): at 12:16

## 2018-11-13 RX ADMIN — Medication 3 MILLILITER(S): at 15:22

## 2018-11-13 RX ADMIN — Medication 100 MILLIGRAM(S): at 18:20

## 2018-11-13 RX ADMIN — Medication 3 MILLILITER(S): at 20:25

## 2018-11-13 RX ADMIN — Medication 12.5 MILLIGRAM(S): at 18:19

## 2018-11-13 RX ADMIN — Medication 100 MILLIGRAM(S): at 06:58

## 2018-11-13 RX ADMIN — Medication 200 MILLIGRAM(S): at 18:19

## 2018-11-13 RX ADMIN — Medication 3 MILLILITER(S): at 08:39

## 2018-11-13 RX ADMIN — LOSARTAN POTASSIUM 25 MILLIGRAM(S): 100 TABLET, FILM COATED ORAL at 06:58

## 2018-11-13 RX ADMIN — AMLODIPINE BESYLATE 10 MILLIGRAM(S): 2.5 TABLET ORAL at 06:58

## 2018-11-13 RX ADMIN — ENOXAPARIN SODIUM 70 MILLIGRAM(S): 100 INJECTION SUBCUTANEOUS at 18:20

## 2018-11-13 NOTE — PROGRESS NOTE ADULT - PROBLEM SELECTOR PLAN 1
new onset; patient denies chest pain, however does admit to shortness of breath  c/w full dose lovenox for now  c/w low dose metoprolol and cozaar  continue to monitor on telemetry  Tx3 negative  Elevated TGL  f/u TSH  f/u echo  Cardio - Dr Patel

## 2018-11-13 NOTE — CONSULT NOTE ADULT - SUBJECTIVE AND OBJECTIVE BOX
CHIEF COMPLAINT:Patient is a 89y old  Female who presents with a chief complaint of cough, shortness of breath for the past 2 weeks (12 Nov 2018 18:39)      HPI:  89 yr old Female with past PMHX of DM, HLD, and HTN presents with 2 weeks of cough. Pt states that she went to her PCP one week ago, and received PO antibiotics, has been taking the medication, however has not been feeling better. Patient states she has some epigastric pain as well, worse with coughing, however has continued to feel progressively short of breath, tehrefore came into ED. Patient denies headache, fever, dizziness, chest pain, palpitations, nausea, vomiting. Patient admits to some diarrhea 3-4 episodes starting yesterday, however denies all other ROS. (12 Nov 2018 18:39)      PAST MEDICAL & SURGICAL HISTORY:  Moderate asthma with acute exacerbation, unspecified whether persistent  DM (diabetes mellitus)  HTN (hypertension)        MEDICATIONS  (STANDING):  ALBUTerol/ipratropium for Nebulization 3 milliLiter(s) Nebulizer every 6 hours  amLODIPine   Tablet 10 milliGRAM(s) Oral daily  aspirin  chewable 81 milliGRAM(s) Oral daily  atorvastatin 20 milliGRAM(s) Oral at bedtime  docusate sodium 100 milliGRAM(s) Oral two times a day  enoxaparin Injectable 70 milliGRAM(s) SubCutaneous every 12 hours  guaiFENesin   Syrup  (Sugar-Free) 200 milliGRAM(s) Oral every 6 hours  insulin lispro (HumaLOG) corrective regimen sliding scale   SubCutaneous three times a day before meals  losartan 25 milliGRAM(s) Oral daily  metoprolol tartrate 12.5 milliGRAM(s) Oral two times a day  montelukast 10 milliGRAM(s) Oral daily  pantoprazole    Tablet 40 milliGRAM(s) Oral before breakfast        FAMILY HISTORY:  No pertinent family history in first degree relatives      SOCIAL HISTORY:    [x ] Non-smoker    [ x] Alcohol    Allergies    No Known Allergies    Intolerances    	    REVIEW OF SYSTEMS:  CONSTITUTIONAL: No fever, weight loss, or fatigue  EYES: No eye pain, visual disturbances, or discharge  ENT:  No difficulty hearing, tinnitus, vertigo; No sinus or throat pain  NECK: No pain or stiffness  RESPIRATORY: + cough, No wheezing, chills or hemoptysis; + Shortness of Breath  CARDIOVASCULAR: + chest pain, No palpitations, passing out, dizziness, or leg swelling  GASTROINTESTINAL: No abdominal or epigastric pain. No nausea, vomiting, or hematemesis; No diarrhea or constipation. No melena or hematochezia.  GENITOURINARY: No dysuria, frequency, hematuria, or incontinence  NEUROLOGICAL: No headaches, memory loss, loss of strength, numbness, or tremors  SKIN: No itching, burning, rashes, or lesions   LYMPH Nodes: No enlarged glands  ENDOCRINE: No heat or cold intolerance; No hair loss  MUSCULOSKELETAL: No joint pain or swelling; No muscle, back, or extremity pain  PSYCHIATRIC: No depression, anxiety, mood swings, or difficulty sleeping  HEME/LYMPH: No easy bruising, or bleeding gums  ALLERGY AND IMMUNOLOGIC: No hives or eczema	      PHYSICAL EXAM:  T(C): 36.7 (11-13-18 @ 07:55), Max: 37.2 (11-12-18 @ 19:19)  HR: 54 (11-13-18 @ 07:55) (54 - 91)  BP: 112/46 (11-13-18 @ 07:55) (112/46 - 147/62)  RR: 18 (11-13-18 @ 07:55) (16 - 18)  SpO2: 95% (11-13-18 @ 07:55) (93% - 98%)  Wt(kg): --  I&O's Summary      Appearance: Normal	  HEENT:   Normal oral mucosa, PERRL, EOMI	  Lymphatic: No lymphadenopathy  Cardiovascular: Normal S1 S2, No JVD, No murmurs, No edema  Respiratory: Lungs clear to auscultation	  Psychiatry: A & O x 3, Mood & affect appropriate  Gastrointestinal:  Soft, Non-tender, + BS	  Skin: No rashes, No ecchymoses, No cyanosis	  Neurologic: Non-focal  Extremities: Normal range of motion, No clubbing, cyanosis or edema  Vascular: Peripheral pulses palpable 2+ bilaterally    TELEMETRY: paf, multiple pauses while in nsr longest 2.3 sec	    	  LABS:	 	    CARDIAC MARKERS:  CARDIAC MARKERS ( 13 Nov 2018 06:46 )  0.018 ng/mL / x     / 42 U/L / x     / 1.0 ng/mL  CARDIAC MARKERS ( 12 Nov 2018 21:02 )  0.020 ng/mL / x     / x     / x     / x      CARDIAC MARKERS ( 12 Nov 2018 12:09 )  <0.015 ng/mL / x     / 44 U/L / x     / x                                  12.1   6.3   )-----------( 207      ( 13 Nov 2018 06:46 )             38.6     11-13    141  |  105  |  17  ----------------------------<  140<H>  3.7   |  25  |  0.76    Ca    9.5      13 Nov 2018 06:46  Phos  4.2     11-13  Mg     2.1     11-13    TPro  8.0  /  Alb  3.8  /  TBili  0.3  /  DBili  x   /  AST  20  /  ALT  26  /  AlkPhos  86  11-12    proBNP: Serum Pro-Brain Natriuretic Peptide: 66 pg/mL (11-12 @ 12:09)    Lipid Profile: Cholesterol 168  LDL 63  HDL 68        TSH: Thyroid Stimulating Hormone, Serum: 1.49 uU/mL (11-13 @ 06:46)    Echocardiogram:    OBSERVATIONS:  Mitral Valve: Normal mitral valve. Trace mitral  regurgitation.  Aortic Root: Normal aortic root.  Aortic Valve: Calcified aortic valve with normal opening.  Left Atrium: Normal left atrium.  LA volume index = 24  cc/m2.  Left Ventricle: Endocardium not well visualized; grossly  normal left ventricular systolic function. Segmental wall  motion could not be assessed. Normal left ventricular  internal dimensions and wall thicknesses.  Right Heart: Normal right atrium. Normal right ventricular  size and function. There is mild tricuspid regurgitation.  There is mild pulmonic regurgitation.  Pericardium/PleuraNormal pericardium with no pericardial  effusion.  Hemodynamic: RA Pressure is 8 mm Hg. RV systolic pressure  is33 mm Hg.    Stress test:    IMPRESSIONS:Normal Study  * Negative ECG evidence of ischemia after IV of Lexiscan.  * Review of raw data shows: The study is of good technical  quality.  * The left ventricle was normal in size. Normal myocardial  perfusion scan, with no evidence of infarction or  inducible ischemia.  * Post-stress resting myocardial perfusion gated SPECT  imaging was performed (LVEF > 70%)

## 2018-11-13 NOTE — CONSULT NOTE ADULT - ASSESSMENT
89 yr old Female with past PMHX of DM, HLD, and HTN presents with 2 weeks of cough and PAF.  1.Tele monitoring-R/O Tachy-Naman.  2.PAF-lovenox, on low dose b blocker.  3.HTN-Cozaar, d/c norvasc.  4.DM-Insulin.  5.Lipid D/O-statin.  6.PPI.

## 2018-11-14 LAB
ANION GAP SERPL CALC-SCNC: 10 MMOL/L — SIGNIFICANT CHANGE UP (ref 5–17)
BUN SERPL-MCNC: 22 MG/DL — HIGH (ref 7–18)
CALCIUM SERPL-MCNC: 8.9 MG/DL — SIGNIFICANT CHANGE UP (ref 8.4–10.5)
CHLORIDE SERPL-SCNC: 106 MMOL/L — SIGNIFICANT CHANGE UP (ref 96–108)
CO2 SERPL-SCNC: 26 MMOL/L — SIGNIFICANT CHANGE UP (ref 22–31)
CREAT SERPL-MCNC: 0.84 MG/DL — SIGNIFICANT CHANGE UP (ref 0.5–1.3)
GLUCOSE SERPL-MCNC: 122 MG/DL — HIGH (ref 70–99)
HCT VFR BLD CALC: 36.5 % — SIGNIFICANT CHANGE UP (ref 34.5–45)
HGB BLD-MCNC: 11.5 G/DL — SIGNIFICANT CHANGE UP (ref 11.5–15.5)
MCHC RBC-ENTMCNC: 25.5 PG — LOW (ref 27–34)
MCHC RBC-ENTMCNC: 31.6 GM/DL — LOW (ref 32–36)
MCV RBC AUTO: 80.8 FL — SIGNIFICANT CHANGE UP (ref 80–100)
PLATELET # BLD AUTO: 192 K/UL — SIGNIFICANT CHANGE UP (ref 150–400)
POTASSIUM SERPL-MCNC: 3.7 MMOL/L — SIGNIFICANT CHANGE UP (ref 3.5–5.3)
POTASSIUM SERPL-SCNC: 3.7 MMOL/L — SIGNIFICANT CHANGE UP (ref 3.5–5.3)
RBC # BLD: 4.52 M/UL — SIGNIFICANT CHANGE UP (ref 3.8–5.2)
RBC # FLD: 13.3 % — SIGNIFICANT CHANGE UP (ref 10.3–14.5)
SODIUM SERPL-SCNC: 142 MMOL/L — SIGNIFICANT CHANGE UP (ref 135–145)
WBC # BLD: 6.6 K/UL — SIGNIFICANT CHANGE UP (ref 3.8–10.5)
WBC # FLD AUTO: 6.6 K/UL — SIGNIFICANT CHANGE UP (ref 3.8–10.5)

## 2018-11-14 PROCEDURE — 99233 SBSQ HOSP IP/OBS HIGH 50: CPT | Mod: GC

## 2018-11-14 RX ORDER — AMIODARONE HYDROCHLORIDE 400 MG/1
400 TABLET ORAL
Qty: 0 | Refills: 0 | Status: COMPLETED | OUTPATIENT
Start: 2018-11-14 | End: 2018-11-19

## 2018-11-14 RX ORDER — POLYETHYLENE GLYCOL 3350 17 G/17G
17 POWDER, FOR SOLUTION ORAL ONCE
Qty: 0 | Refills: 0 | Status: COMPLETED | OUTPATIENT
Start: 2018-11-14 | End: 2018-11-14

## 2018-11-14 RX ORDER — SENNA PLUS 8.6 MG/1
2 TABLET ORAL AT BEDTIME
Qty: 0 | Refills: 0 | Status: DISCONTINUED | OUTPATIENT
Start: 2018-11-14 | End: 2018-11-19

## 2018-11-14 RX ADMIN — Medication 200 MILLIGRAM(S): at 00:00

## 2018-11-14 RX ADMIN — Medication 40 MILLIGRAM(S): at 14:04

## 2018-11-14 RX ADMIN — ATORVASTATIN CALCIUM 20 MILLIGRAM(S): 80 TABLET, FILM COATED ORAL at 21:07

## 2018-11-14 RX ADMIN — SENNA PLUS 2 TABLET(S): 8.6 TABLET ORAL at 21:07

## 2018-11-14 RX ADMIN — LOSARTAN POTASSIUM 25 MILLIGRAM(S): 100 TABLET, FILM COATED ORAL at 06:33

## 2018-11-14 RX ADMIN — Medication 100 MILLIGRAM(S): at 06:33

## 2018-11-14 RX ADMIN — Medication 3 MILLILITER(S): at 09:05

## 2018-11-14 RX ADMIN — ENOXAPARIN SODIUM 70 MILLIGRAM(S): 100 INJECTION SUBCUTANEOUS at 06:33

## 2018-11-14 RX ADMIN — Medication 200 MILLIGRAM(S): at 06:33

## 2018-11-14 RX ADMIN — PANTOPRAZOLE SODIUM 40 MILLIGRAM(S): 20 TABLET, DELAYED RELEASE ORAL at 06:34

## 2018-11-14 RX ADMIN — ENOXAPARIN SODIUM 70 MILLIGRAM(S): 100 INJECTION SUBCUTANEOUS at 17:36

## 2018-11-14 RX ADMIN — Medication 12.5 MILLIGRAM(S): at 06:33

## 2018-11-14 RX ADMIN — AMIODARONE HYDROCHLORIDE 400 MILLIGRAM(S): 400 TABLET ORAL at 17:36

## 2018-11-14 RX ADMIN — POLYETHYLENE GLYCOL 3350 17 GRAM(S): 17 POWDER, FOR SOLUTION ORAL at 13:06

## 2018-11-14 RX ADMIN — Medication 100 MILLIGRAM(S): at 17:36

## 2018-11-14 RX ADMIN — Medication 100 MILLIGRAM(S): at 21:07

## 2018-11-14 RX ADMIN — Medication 1: at 16:57

## 2018-11-14 RX ADMIN — Medication 100 MILLIGRAM(S): at 13:06

## 2018-11-14 RX ADMIN — Medication 81 MILLIGRAM(S): at 13:05

## 2018-11-14 RX ADMIN — MONTELUKAST 10 MILLIGRAM(S): 4 TABLET, CHEWABLE ORAL at 13:06

## 2018-11-14 NOTE — PROGRESS NOTE ADULT - PROBLEM SELECTOR PLAN 1
new onset; patient denies chest pain, however does admit to shortness of breath  c/w full dose lovenox for now  c/w low dose metoprolol and cozaar  continue to monitor on telemetry  Tx3 negative  Elevated TGL  TSH wnl  Echo - EF 55-60%, grade 2 diastolic dysfunction, mild TR and CO  Started amio loading today day 1/5  Cardio - Dr Patel

## 2018-11-15 LAB
ANION GAP SERPL CALC-SCNC: 8 MMOL/L — SIGNIFICANT CHANGE UP (ref 5–17)
BUN SERPL-MCNC: 23 MG/DL — HIGH (ref 7–18)
CALCIUM SERPL-MCNC: 9 MG/DL — SIGNIFICANT CHANGE UP (ref 8.4–10.5)
CHLORIDE SERPL-SCNC: 104 MMOL/L — SIGNIFICANT CHANGE UP (ref 96–108)
CO2 SERPL-SCNC: 27 MMOL/L — SIGNIFICANT CHANGE UP (ref 22–31)
CREAT SERPL-MCNC: 0.72 MG/DL — SIGNIFICANT CHANGE UP (ref 0.5–1.3)
GLUCOSE SERPL-MCNC: 136 MG/DL — HIGH (ref 70–99)
HCT VFR BLD CALC: 34.8 % — SIGNIFICANT CHANGE UP (ref 34.5–45)
HGB BLD-MCNC: 10.9 G/DL — LOW (ref 11.5–15.5)
MCHC RBC-ENTMCNC: 25.3 PG — LOW (ref 27–34)
MCHC RBC-ENTMCNC: 31.4 GM/DL — LOW (ref 32–36)
MCV RBC AUTO: 80.8 FL — SIGNIFICANT CHANGE UP (ref 80–100)
PLATELET # BLD AUTO: 181 K/UL — SIGNIFICANT CHANGE UP (ref 150–400)
POTASSIUM SERPL-MCNC: 4 MMOL/L — SIGNIFICANT CHANGE UP (ref 3.5–5.3)
POTASSIUM SERPL-SCNC: 4 MMOL/L — SIGNIFICANT CHANGE UP (ref 3.5–5.3)
RBC # BLD: 4.32 M/UL — SIGNIFICANT CHANGE UP (ref 3.8–5.2)
RBC # FLD: 13.6 % — SIGNIFICANT CHANGE UP (ref 10.3–14.5)
SODIUM SERPL-SCNC: 139 MMOL/L — SIGNIFICANT CHANGE UP (ref 135–145)
WBC # BLD: 5.6 K/UL — SIGNIFICANT CHANGE UP (ref 3.8–10.5)
WBC # FLD AUTO: 5.6 K/UL — SIGNIFICANT CHANGE UP (ref 3.8–10.5)

## 2018-11-15 PROCEDURE — 99233 SBSQ HOSP IP/OBS HIGH 50: CPT | Mod: GC

## 2018-11-15 RX ORDER — CHOLECALCIFEROL (VITAMIN D3) 125 MCG
1000 CAPSULE ORAL DAILY
Qty: 0 | Refills: 0 | Status: DISCONTINUED | OUTPATIENT
Start: 2018-11-15 | End: 2018-11-19

## 2018-11-15 RX ADMIN — Medication 2: at 16:51

## 2018-11-15 RX ADMIN — Medication 3 MILLILITER(S): at 09:03

## 2018-11-15 RX ADMIN — Medication 100 MILLIGRAM(S): at 05:30

## 2018-11-15 RX ADMIN — AMIODARONE HYDROCHLORIDE 400 MILLIGRAM(S): 400 TABLET ORAL at 05:30

## 2018-11-15 RX ADMIN — Medication 40 MILLIGRAM(S): at 05:30

## 2018-11-15 RX ADMIN — Medication 3: at 12:31

## 2018-11-15 RX ADMIN — Medication 81 MILLIGRAM(S): at 12:31

## 2018-11-15 RX ADMIN — LOSARTAN POTASSIUM 25 MILLIGRAM(S): 100 TABLET, FILM COATED ORAL at 05:30

## 2018-11-15 RX ADMIN — MONTELUKAST 10 MILLIGRAM(S): 4 TABLET, CHEWABLE ORAL at 12:31

## 2018-11-15 RX ADMIN — PANTOPRAZOLE SODIUM 40 MILLIGRAM(S): 20 TABLET, DELAYED RELEASE ORAL at 05:31

## 2018-11-15 RX ADMIN — ENOXAPARIN SODIUM 70 MILLIGRAM(S): 100 INJECTION SUBCUTANEOUS at 05:30

## 2018-11-15 RX ADMIN — Medication 100 MILLIGRAM(S): at 13:39

## 2018-11-15 RX ADMIN — Medication 100 MILLIGRAM(S): at 21:23

## 2018-11-15 RX ADMIN — SENNA PLUS 2 TABLET(S): 8.6 TABLET ORAL at 21:23

## 2018-11-15 RX ADMIN — ENOXAPARIN SODIUM 70 MILLIGRAM(S): 100 INJECTION SUBCUTANEOUS at 17:45

## 2018-11-15 RX ADMIN — Medication 1000 UNIT(S): at 12:33

## 2018-11-15 NOTE — PROGRESS NOTE ADULT - PROBLEM SELECTOR PLAN 1
new onset; patient denies chest pain, however does admit to shortness of breath  c/w full dose lovenox for now  c/w low dose metoprolol and cozaar  continue to monitor on telemetry  Tx3 negative  Elevated TGL  TSH wnl  Echo - EF 55-60%, grade 2 diastolic dysfunction, mild TR and NJ  Started amio loading today day 2/5  Cardio - Dr Patel new onset; patient denies chest pain, however does admit to shortness of breath  c/w full dose lovenox for now    continue to monitor on telemetry  Tx3 negative  Elevated TGL  TSH wnl  Echo - EF 55-60%, grade 2 diastolic dysfunction, mild TR and UT  Started amio loading today day 2/5  Cardio - Dr aPtel

## 2018-11-16 ENCOUNTER — TRANSCRIPTION ENCOUNTER (OUTPATIENT)
Age: 83
End: 2018-11-16

## 2018-11-16 LAB
ANION GAP SERPL CALC-SCNC: 9 MMOL/L — SIGNIFICANT CHANGE UP (ref 5–17)
BUN SERPL-MCNC: 24 MG/DL — HIGH (ref 7–18)
CALCIUM SERPL-MCNC: 9.3 MG/DL — SIGNIFICANT CHANGE UP (ref 8.4–10.5)
CHLORIDE SERPL-SCNC: 106 MMOL/L — SIGNIFICANT CHANGE UP (ref 96–108)
CO2 SERPL-SCNC: 27 MMOL/L — SIGNIFICANT CHANGE UP (ref 22–31)
CREAT SERPL-MCNC: 0.85 MG/DL — SIGNIFICANT CHANGE UP (ref 0.5–1.3)
GLUCOSE SERPL-MCNC: 109 MG/DL — HIGH (ref 70–99)
HCT VFR BLD CALC: 37 % — SIGNIFICANT CHANGE UP (ref 34.5–45)
HGB BLD-MCNC: 11.4 G/DL — LOW (ref 11.5–15.5)
MCHC RBC-ENTMCNC: 25.2 PG — LOW (ref 27–34)
MCHC RBC-ENTMCNC: 30.9 GM/DL — LOW (ref 32–36)
MCV RBC AUTO: 81.4 FL — SIGNIFICANT CHANGE UP (ref 80–100)
PLATELET # BLD AUTO: 190 K/UL — SIGNIFICANT CHANGE UP (ref 150–400)
POTASSIUM SERPL-MCNC: 3.8 MMOL/L — SIGNIFICANT CHANGE UP (ref 3.5–5.3)
POTASSIUM SERPL-SCNC: 3.8 MMOL/L — SIGNIFICANT CHANGE UP (ref 3.5–5.3)
RBC # BLD: 4.55 M/UL — SIGNIFICANT CHANGE UP (ref 3.8–5.2)
RBC # FLD: 13.9 % — SIGNIFICANT CHANGE UP (ref 10.3–14.5)
SODIUM SERPL-SCNC: 142 MMOL/L — SIGNIFICANT CHANGE UP (ref 135–145)
VIT B12 SERPL-MCNC: 1056 PG/ML — SIGNIFICANT CHANGE UP (ref 232–1245)
WBC # BLD: 7.9 K/UL — SIGNIFICANT CHANGE UP (ref 3.8–10.5)
WBC # FLD AUTO: 7.9 K/UL — SIGNIFICANT CHANGE UP (ref 3.8–10.5)

## 2018-11-16 PROCEDURE — 99232 SBSQ HOSP IP/OBS MODERATE 35: CPT | Mod: GC

## 2018-11-16 RX ADMIN — Medication 81 MILLIGRAM(S): at 12:32

## 2018-11-16 RX ADMIN — ATORVASTATIN CALCIUM 20 MILLIGRAM(S): 80 TABLET, FILM COATED ORAL at 22:37

## 2018-11-16 RX ADMIN — Medication 1: at 08:26

## 2018-11-16 RX ADMIN — Medication 100 MILLIGRAM(S): at 22:37

## 2018-11-16 RX ADMIN — LOSARTAN POTASSIUM 25 MILLIGRAM(S): 100 TABLET, FILM COATED ORAL at 06:25

## 2018-11-16 RX ADMIN — Medication 3 MILLILITER(S): at 09:01

## 2018-11-16 RX ADMIN — SENNA PLUS 2 TABLET(S): 8.6 TABLET ORAL at 22:37

## 2018-11-16 RX ADMIN — AMIODARONE HYDROCHLORIDE 400 MILLIGRAM(S): 400 TABLET ORAL at 08:23

## 2018-11-16 RX ADMIN — ENOXAPARIN SODIUM 70 MILLIGRAM(S): 100 INJECTION SUBCUTANEOUS at 06:25

## 2018-11-16 RX ADMIN — AMIODARONE HYDROCHLORIDE 400 MILLIGRAM(S): 400 TABLET ORAL at 17:22

## 2018-11-16 RX ADMIN — Medication 1000 UNIT(S): at 12:32

## 2018-11-16 RX ADMIN — MONTELUKAST 10 MILLIGRAM(S): 4 TABLET, CHEWABLE ORAL at 12:33

## 2018-11-16 RX ADMIN — Medication 2: at 17:22

## 2018-11-16 RX ADMIN — Medication 100 MILLIGRAM(S): at 17:22

## 2018-11-16 RX ADMIN — Medication 3 MILLILITER(S): at 20:36

## 2018-11-16 RX ADMIN — Medication 100 MILLIGRAM(S): at 06:25

## 2018-11-16 RX ADMIN — Medication 2: at 12:33

## 2018-11-16 RX ADMIN — Medication 100 MILLIGRAM(S): at 14:00

## 2018-11-16 RX ADMIN — PANTOPRAZOLE SODIUM 40 MILLIGRAM(S): 20 TABLET, DELAYED RELEASE ORAL at 06:25

## 2018-11-16 RX ADMIN — Medication 40 MILLIGRAM(S): at 06:25

## 2018-11-16 RX ADMIN — ENOXAPARIN SODIUM 70 MILLIGRAM(S): 100 INJECTION SUBCUTANEOUS at 17:23

## 2018-11-16 NOTE — PROGRESS NOTE ADULT - PROBLEM SELECTOR PLAN 1
new onset; patient denies chest pain, however does admit to shortness of breath  c/w full dose lovenox for now  continue to monitor on telemetry  Tx3 negative  Elevated TGL  TSH wnl  Echo - EF 55-60%, grade 2 diastolic dysfunction, mild TR and TX  Started amio loading day 3/5  Cardio - Dr Patel new onset; patient denies chest pain, however does admit to shortness of breath  c/w full dose lovenox for now  continue to monitor on telemetry  Tx3 negative  Elevated TGL  TSH wnl  Echo - EF 55-60%, grade 2 diastolic dysfunction, mild TR and ME  Started amio loading day 3/5  Called pharmacy -[phone], insurance covers eliquis, xarelto, and pradaxa  Cardio - Dr Patel

## 2018-11-17 LAB
ANION GAP SERPL CALC-SCNC: 8 MMOL/L — SIGNIFICANT CHANGE UP (ref 5–17)
BUN SERPL-MCNC: 32 MG/DL — HIGH (ref 7–18)
CALCIUM SERPL-MCNC: 9.1 MG/DL — SIGNIFICANT CHANGE UP (ref 8.4–10.5)
CHLORIDE SERPL-SCNC: 105 MMOL/L — SIGNIFICANT CHANGE UP (ref 96–108)
CO2 SERPL-SCNC: 25 MMOL/L — SIGNIFICANT CHANGE UP (ref 22–31)
CREAT SERPL-MCNC: 1.04 MG/DL — SIGNIFICANT CHANGE UP (ref 0.5–1.3)
GLUCOSE SERPL-MCNC: 128 MG/DL — HIGH (ref 70–99)
HCT VFR BLD CALC: 35 % — SIGNIFICANT CHANGE UP (ref 34.5–45)
HGB BLD-MCNC: 11.1 G/DL — LOW (ref 11.5–15.5)
MCHC RBC-ENTMCNC: 25.7 PG — LOW (ref 27–34)
MCHC RBC-ENTMCNC: 31.7 GM/DL — LOW (ref 32–36)
MCV RBC AUTO: 81.1 FL — SIGNIFICANT CHANGE UP (ref 80–100)
PLATELET # BLD AUTO: 198 K/UL — SIGNIFICANT CHANGE UP (ref 150–400)
POTASSIUM SERPL-MCNC: 3.9 MMOL/L — SIGNIFICANT CHANGE UP (ref 3.5–5.3)
POTASSIUM SERPL-SCNC: 3.9 MMOL/L — SIGNIFICANT CHANGE UP (ref 3.5–5.3)
RBC # BLD: 4.31 M/UL — SIGNIFICANT CHANGE UP (ref 3.8–5.2)
RBC # FLD: 13.8 % — SIGNIFICANT CHANGE UP (ref 10.3–14.5)
SODIUM SERPL-SCNC: 138 MMOL/L — SIGNIFICANT CHANGE UP (ref 135–145)
VIT B12 SERPL-MCNC: 1022 PG/ML — SIGNIFICANT CHANGE UP (ref 232–1245)
WBC # BLD: 8.4 K/UL — SIGNIFICANT CHANGE UP (ref 3.8–10.5)
WBC # FLD AUTO: 8.4 K/UL — SIGNIFICANT CHANGE UP (ref 3.8–10.5)

## 2018-11-17 PROCEDURE — 99232 SBSQ HOSP IP/OBS MODERATE 35: CPT | Mod: GC

## 2018-11-17 RX ADMIN — Medication 100 MILLIGRAM(S): at 05:44

## 2018-11-17 RX ADMIN — Medication 3 MILLILITER(S): at 15:07

## 2018-11-17 RX ADMIN — Medication 1000 UNIT(S): at 11:37

## 2018-11-17 RX ADMIN — AMIODARONE HYDROCHLORIDE 400 MILLIGRAM(S): 400 TABLET ORAL at 05:44

## 2018-11-17 RX ADMIN — Medication 100 MILLIGRAM(S): at 17:04

## 2018-11-17 RX ADMIN — AMIODARONE HYDROCHLORIDE 400 MILLIGRAM(S): 400 TABLET ORAL at 17:04

## 2018-11-17 RX ADMIN — Medication 81 MILLIGRAM(S): at 11:37

## 2018-11-17 RX ADMIN — LOSARTAN POTASSIUM 25 MILLIGRAM(S): 100 TABLET, FILM COATED ORAL at 05:44

## 2018-11-17 RX ADMIN — MONTELUKAST 10 MILLIGRAM(S): 4 TABLET, CHEWABLE ORAL at 11:37

## 2018-11-17 RX ADMIN — PANTOPRAZOLE SODIUM 40 MILLIGRAM(S): 20 TABLET, DELAYED RELEASE ORAL at 05:45

## 2018-11-17 RX ADMIN — ENOXAPARIN SODIUM 70 MILLIGRAM(S): 100 INJECTION SUBCUTANEOUS at 17:05

## 2018-11-17 RX ADMIN — ATORVASTATIN CALCIUM 20 MILLIGRAM(S): 80 TABLET, FILM COATED ORAL at 20:55

## 2018-11-17 RX ADMIN — Medication 100 MILLIGRAM(S): at 13:05

## 2018-11-17 RX ADMIN — SENNA PLUS 2 TABLET(S): 8.6 TABLET ORAL at 20:55

## 2018-11-17 RX ADMIN — ENOXAPARIN SODIUM 70 MILLIGRAM(S): 100 INJECTION SUBCUTANEOUS at 05:45

## 2018-11-17 NOTE — PROGRESS NOTE ADULT - NSHPATTENDINGPLANDISCUSS_GEN_ALL_CORE
Dr. Colón PGY1
Dr. Patel cardiologist, Dr. Vinson PGY1
Dr. Patel cardiologist, Dr. Vinson PGY1
Dr. Vinson,  PGY1
Dr. Vinson PGY1

## 2018-11-17 NOTE — PROGRESS NOTE ADULT - PROBLEM SELECTOR PLAN 1
new onset; patient denies chest pain, however does admit to shortness of breath  c/w full dose lovenox for now  continue to monitor on telemetry  Tx3 negative  Elevated TGL  TSH wnl  Echo - EF 55-60%, grade 2 diastolic dysfunction, mild TR and PA  Started amio loading day 4/5  Called pharmacy [phone], insurance covers eliquis, xarelto, and pradaxa  Cardio - Dr Patel

## 2018-11-18 LAB
ANION GAP SERPL CALC-SCNC: 8 MMOL/L — SIGNIFICANT CHANGE UP (ref 5–17)
BUN SERPL-MCNC: 28 MG/DL — HIGH (ref 7–18)
CALCIUM SERPL-MCNC: 9 MG/DL — SIGNIFICANT CHANGE UP (ref 8.4–10.5)
CHLORIDE SERPL-SCNC: 106 MMOL/L — SIGNIFICANT CHANGE UP (ref 96–108)
CO2 SERPL-SCNC: 26 MMOL/L — SIGNIFICANT CHANGE UP (ref 22–31)
CREAT SERPL-MCNC: 0.87 MG/DL — SIGNIFICANT CHANGE UP (ref 0.5–1.3)
GLUCOSE SERPL-MCNC: 122 MG/DL — HIGH (ref 70–99)
HCT VFR BLD CALC: 33.7 % — LOW (ref 34.5–45)
HGB BLD-MCNC: 10.6 G/DL — LOW (ref 11.5–15.5)
MCHC RBC-ENTMCNC: 25.5 PG — LOW (ref 27–34)
MCHC RBC-ENTMCNC: 31.5 GM/DL — LOW (ref 32–36)
MCV RBC AUTO: 81 FL — SIGNIFICANT CHANGE UP (ref 80–100)
PLATELET # BLD AUTO: 176 K/UL — SIGNIFICANT CHANGE UP (ref 150–400)
POTASSIUM SERPL-MCNC: 4.1 MMOL/L — SIGNIFICANT CHANGE UP (ref 3.5–5.3)
POTASSIUM SERPL-SCNC: 4.1 MMOL/L — SIGNIFICANT CHANGE UP (ref 3.5–5.3)
RBC # BLD: 4.16 M/UL — SIGNIFICANT CHANGE UP (ref 3.8–5.2)
RBC # FLD: 13.7 % — SIGNIFICANT CHANGE UP (ref 10.3–14.5)
SODIUM SERPL-SCNC: 140 MMOL/L — SIGNIFICANT CHANGE UP (ref 135–145)
WBC # BLD: 7.6 K/UL — SIGNIFICANT CHANGE UP (ref 3.8–10.5)
WBC # FLD AUTO: 7.6 K/UL — SIGNIFICANT CHANGE UP (ref 3.8–10.5)

## 2018-11-18 PROCEDURE — 99232 SBSQ HOSP IP/OBS MODERATE 35: CPT | Mod: GC

## 2018-11-18 RX ADMIN — Medication 1000 UNIT(S): at 12:39

## 2018-11-18 RX ADMIN — ENOXAPARIN SODIUM 70 MILLIGRAM(S): 100 INJECTION SUBCUTANEOUS at 17:03

## 2018-11-18 RX ADMIN — ATORVASTATIN CALCIUM 20 MILLIGRAM(S): 80 TABLET, FILM COATED ORAL at 21:40

## 2018-11-18 RX ADMIN — Medication 3 MILLILITER(S): at 08:55

## 2018-11-18 RX ADMIN — LOSARTAN POTASSIUM 25 MILLIGRAM(S): 100 TABLET, FILM COATED ORAL at 06:12

## 2018-11-18 RX ADMIN — Medication 81 MILLIGRAM(S): at 12:39

## 2018-11-18 RX ADMIN — Medication 100 MILLIGRAM(S): at 06:12

## 2018-11-18 RX ADMIN — Medication 1: at 12:38

## 2018-11-18 RX ADMIN — AMIODARONE HYDROCHLORIDE 400 MILLIGRAM(S): 400 TABLET ORAL at 17:02

## 2018-11-18 RX ADMIN — Medication 1 DROP(S): at 17:03

## 2018-11-18 RX ADMIN — Medication 100 MILLIGRAM(S): at 17:03

## 2018-11-18 RX ADMIN — Medication 3 MILLILITER(S): at 14:35

## 2018-11-18 RX ADMIN — SENNA PLUS 2 TABLET(S): 8.6 TABLET ORAL at 21:40

## 2018-11-18 RX ADMIN — ENOXAPARIN SODIUM 70 MILLIGRAM(S): 100 INJECTION SUBCUTANEOUS at 06:17

## 2018-11-18 RX ADMIN — Medication 3 MILLILITER(S): at 20:14

## 2018-11-18 RX ADMIN — AMIODARONE HYDROCHLORIDE 400 MILLIGRAM(S): 400 TABLET ORAL at 06:12

## 2018-11-18 RX ADMIN — PANTOPRAZOLE SODIUM 40 MILLIGRAM(S): 20 TABLET, DELAYED RELEASE ORAL at 06:11

## 2018-11-18 RX ADMIN — Medication 100 MILLIGRAM(S): at 21:40

## 2018-11-18 RX ADMIN — MONTELUKAST 10 MILLIGRAM(S): 4 TABLET, CHEWABLE ORAL at 12:39

## 2018-11-18 NOTE — PROGRESS NOTE ADULT - PROBLEM SELECTOR PLAN 1
Rate controlled: on Amiodarone loading dose 400 mg po twice daily, day 5 today   On Enoxaparin full dose for anticoagulation

## 2018-11-19 VITALS
TEMPERATURE: 98 F | OXYGEN SATURATION: 100 % | HEART RATE: 57 BPM | RESPIRATION RATE: 16 BRPM | DIASTOLIC BLOOD PRESSURE: 38 MMHG | SYSTOLIC BLOOD PRESSURE: 130 MMHG

## 2018-11-19 LAB
ANION GAP SERPL CALC-SCNC: 7 MMOL/L — SIGNIFICANT CHANGE UP (ref 5–17)
BUN SERPL-MCNC: 25 MG/DL — HIGH (ref 7–18)
CALCIUM SERPL-MCNC: 8.9 MG/DL — SIGNIFICANT CHANGE UP (ref 8.4–10.5)
CHLORIDE SERPL-SCNC: 106 MMOL/L — SIGNIFICANT CHANGE UP (ref 96–108)
CO2 SERPL-SCNC: 25 MMOL/L — SIGNIFICANT CHANGE UP (ref 22–31)
CREAT SERPL-MCNC: 0.94 MG/DL — SIGNIFICANT CHANGE UP (ref 0.5–1.3)
GLUCOSE SERPL-MCNC: 148 MG/DL — HIGH (ref 70–99)
HCT VFR BLD CALC: 36 % — SIGNIFICANT CHANGE UP (ref 34.5–45)
HGB BLD-MCNC: 11.3 G/DL — LOW (ref 11.5–15.5)
MCHC RBC-ENTMCNC: 25.7 PG — LOW (ref 27–34)
MCHC RBC-ENTMCNC: 31.6 GM/DL — LOW (ref 32–36)
MCV RBC AUTO: 81.6 FL — SIGNIFICANT CHANGE UP (ref 80–100)
PLATELET # BLD AUTO: 182 K/UL — SIGNIFICANT CHANGE UP (ref 150–400)
POTASSIUM SERPL-MCNC: 4 MMOL/L — SIGNIFICANT CHANGE UP (ref 3.5–5.3)
POTASSIUM SERPL-SCNC: 4 MMOL/L — SIGNIFICANT CHANGE UP (ref 3.5–5.3)
RBC # BLD: 4.41 M/UL — SIGNIFICANT CHANGE UP (ref 3.8–5.2)
RBC # FLD: 13.7 % — SIGNIFICANT CHANGE UP (ref 10.3–14.5)
SODIUM SERPL-SCNC: 138 MMOL/L — SIGNIFICANT CHANGE UP (ref 135–145)
WBC # BLD: 8.2 K/UL — SIGNIFICANT CHANGE UP (ref 3.8–10.5)
WBC # FLD AUTO: 8.2 K/UL — SIGNIFICANT CHANGE UP (ref 3.8–10.5)

## 2018-11-19 PROCEDURE — 99239 HOSP IP/OBS DSCHRG MGMT >30: CPT

## 2018-11-19 RX ORDER — AMIODARONE HYDROCHLORIDE 400 MG/1
200 TABLET ORAL DAILY
Qty: 0 | Refills: 0 | Status: DISCONTINUED | OUTPATIENT
Start: 2018-11-20 | End: 2018-11-19

## 2018-11-19 RX ORDER — CHOLECALCIFEROL (VITAMIN D3) 125 MCG
1000 CAPSULE ORAL
Qty: 30 | Refills: 0 | OUTPATIENT
Start: 2018-11-19

## 2018-11-19 RX ORDER — APIXABAN 2.5 MG/1
5 TABLET, FILM COATED ORAL
Qty: 5 | Refills: 0 | OUTPATIENT
Start: 2018-11-19

## 2018-11-19 RX ORDER — AMIODARONE HYDROCHLORIDE 400 MG/1
1 TABLET ORAL
Qty: 30 | Refills: 0 | OUTPATIENT
Start: 2018-11-19 | End: 2018-12-18

## 2018-11-19 RX ORDER — AMLODIPINE BESYLATE 2.5 MG/1
1 TABLET ORAL
Qty: 0 | Refills: 0 | COMMUNITY

## 2018-11-19 RX ADMIN — LOSARTAN POTASSIUM 25 MILLIGRAM(S): 100 TABLET, FILM COATED ORAL at 06:47

## 2018-11-19 RX ADMIN — Medication 1 DROP(S): at 06:43

## 2018-11-19 RX ADMIN — Medication 1000 UNIT(S): at 11:54

## 2018-11-19 RX ADMIN — Medication 100 MILLIGRAM(S): at 06:47

## 2018-11-19 RX ADMIN — MONTELUKAST 10 MILLIGRAM(S): 4 TABLET, CHEWABLE ORAL at 11:54

## 2018-11-19 RX ADMIN — ENOXAPARIN SODIUM 70 MILLIGRAM(S): 100 INJECTION SUBCUTANEOUS at 06:45

## 2018-11-19 RX ADMIN — AMIODARONE HYDROCHLORIDE 400 MILLIGRAM(S): 400 TABLET ORAL at 06:47

## 2018-11-19 RX ADMIN — Medication 3 MILLILITER(S): at 02:22

## 2018-11-19 RX ADMIN — Medication 81 MILLIGRAM(S): at 11:54

## 2018-11-19 RX ADMIN — Medication 1: at 08:12

## 2018-11-19 RX ADMIN — PANTOPRAZOLE SODIUM 40 MILLIGRAM(S): 20 TABLET, DELAYED RELEASE ORAL at 06:47

## 2018-11-19 NOTE — PROGRESS NOTE ADULT - SUBJECTIVE AND OBJECTIVE BOX
CHIEF COMPLAINT: Patient is a 89y old  Female who presents with a chief complaint of cough, shortness of breath for the past 2 weeks. Pt appears comfortable.    	  REVIEW OF SYSTEMS:  CONSTITUTIONAL: No fever, weight loss, or fatigue  EYES: No eye pain, visual disturbances, or discharge  ENT:  No difficulty hearing, tinnitus, vertigo; No sinus or throat pain  NECK: No pain or stiffness  RESPIRATORY: No cough, wheezing, chills or hemoptysis; No Shortness of Breath  CARDIOVASCULAR: No chest pain, palpitations, passing out, dizziness, or leg swelling  GASTROINTESTINAL: No abdominal or epigastric pain. No nausea, vomiting, or hematemesis; No diarrhea or constipation. No melena or hematochezia.  GENITOURINARY: No dysuria, frequency, hematuria, or incontinence  NEUROLOGICAL: No headaches, memory loss, loss of strength, numbness, or tremors  SKIN: No itching, burning, rashes, or lesions   LYMPH Nodes: No enlarged glands  ENDOCRINE: No heat or cold intolerance; No hair loss  MUSCULOSKELETAL: No joint pain or swelling; No muscle, back, or extremity pain  PSYCHIATRIC: No depression, anxiety, mood swings, or difficulty sleeping  HEME/LYMPH: No easy bruising, or bleeding gums  ALLERGY AND IMMUNOLOGIC: No hives or eczema	    Tele:nsr      PHYSICAL EXAM:  T(C): 36.5 (11-15-18 @ 07:49), Max: 36.9 (11-14-18 @ 15:33)  HR: 62 (11-15-18 @ 07:49) (57 - 79)  BP: 127/62 (11-15-18 @ 07:49) (119/43 - 150/66)  RR: 18 (11-15-18 @ 07:49) (18 - 18)  SpO2: 98% (11-15-18 @ 07:49) (96% - 100%)    I&O's Summary    14 Nov 2018 07:01  -  15 Nov 2018 07:00  --------------------------------------------------------  IN: 250 mL / OUT: 0 mL / NET: 250 mL        Appearance: Normal	  HEENT:   Normal oral mucosa, PERRL, EOMI	  Lymphatic: No lymphadenopathy  Cardiovascular: Normal S1 S2, No JVD, No murmurs, No edema  Respiratory: Lungs clear to auscultation	  Psychiatry: A & O x 3, Mood & affect appropriate  Gastrointestinal:  Soft, Non-tender, + BS	  Skin: No rashes, No ecchymoses, No cyanosis	  Neurologic: Non-focal  Extremities: Normal range of motion, No clubbing, cyanosis or edema  Vascular: Peripheral pulses palpable 2+ bilaterally    MEDICATIONS  (STANDING):  ALBUTerol/ipratropium for Nebulization 3 milliLiter(s) Nebulizer every 6 hours  amiodarone    Tablet 400 milliGRAM(s) Oral two times a day  aspirin  chewable 81 milliGRAM(s) Oral daily  atorvastatin 20 milliGRAM(s) Oral at bedtime  benzonatate 100 milliGRAM(s) Oral three times a day  docusate sodium 100 milliGRAM(s) Oral two times a day  enoxaparin Injectable 70 milliGRAM(s) SubCutaneous every 12 hours  insulin lispro (HumaLOG) corrective regimen sliding scale   SubCutaneous three times a day before meals  losartan 25 milliGRAM(s) Oral daily  montelukast 10 milliGRAM(s) Oral daily  pantoprazole    Tablet 40 milliGRAM(s) Oral before breakfast  predniSONE   Tablet 40 milliGRAM(s) Oral daily  senna 2 Tablet(s) Oral at bedtime      	  LABS:	 	                        10.9   5.6   )-----------( 181      ( 15 Nov 2018 07:30 )             34.8     11-15    139  |  104  |  23<H>  ----------------------------<  136<H>  4.0   |  27  |  0.72    Ca    9.0      15 Nov 2018 07:30      proBNP: Serum Pro-Brain Natriuretic Peptide: 66 pg/mL (11-12 @ 12:09)    Lipid Profile: Cholesterol 168  LDL 63  HDL 68      HgA1c: Hemoglobin A1C, Whole Blood: 6.6 % (11-13 @ 10:01)    TSH: Thyroid Stimulating Hormone, Serum: 1.49 uU/mL (11-13 @ 06:46)
CHIEF COMPLAINT:Patient is a 89y old  Female who presents with a chief complaint of cough, shortness of breath for the past 2 weeks .Pt appears comfortable.    	  REVIEW OF SYSTEMS:  CONSTITUTIONAL: No fever, weight loss, or fatigue  EYES: No eye pain, visual disturbances, or discharge  ENT:  No difficulty hearing, tinnitus, vertigo; No sinus or throat pain  NECK: No pain or stiffness  RESPIRATORY: No cough, wheezing, chills or hemoptysis; No Shortness of Breath  CARDIOVASCULAR: No chest pain, palpitations, passing out, dizziness, or leg swelling  GASTROINTESTINAL: No abdominal or epigastric pain. No nausea, vomiting, or hematemesis; No diarrhea or constipation. No melena or hematochezia.  GENITOURINARY: No dysuria, frequency, hematuria, or incontinence  NEUROLOGICAL: No headaches, memory loss, loss of strength, numbness, or tremors  SKIN: No itching, burning, rashes, or lesions   LYMPH Nodes: No enlarged glands  ENDOCRINE: No heat or cold intolerance; No hair loss  MUSCULOSKELETAL: No joint pain or swelling; No muscle, back, or extremity pain  PSYCHIATRIC: No depression, anxiety, mood swings, or difficulty sleeping  HEME/LYMPH: No easy bruising, or bleeding gums  ALLERGY AND IMMUNOLOGIC: No hives or eczema	      PHYSICAL EXAM:  T(C): 36.8 (11-19-18 @ 07:26), Max: 36.9 (11-18-18 @ 11:20)  HR: 52 (11-19-18 @ 07:26) (52 - 68)  BP: 129/40 (11-19-18 @ 07:26) (123/43 - 158/48)  RR: 16 (11-19-18 @ 07:26) (16 - 18)  SpO2: 99% (11-19-18 @ 07:26) (98% - 100%)      18 Nov 2018 07:01  -  19 Nov 2018 07:00  --------------------------------------------------------  IN: 200 mL / OUT: 0 mL / NET: 200 mL        Appearance: Normal	  HEENT:   Normal oral mucosa, PERRL, EOMI	  Lymphatic: No lymphadenopathy  Cardiovascular: Normal S1 S2, No JVD, No murmurs, No edema  Respiratory: Lungs clear to auscultation	  Psychiatry: A & O x 3, Mood & affect appropriate  Gastrointestinal:  Soft, Non-tender, + BS	  Skin: No rashes, No ecchymoses, No cyanosis	  Neurologic: Non-focal  Extremities: Normal range of motion, No clubbing, cyanosis or edema  Vascular: Peripheral pulses palpable 2+ bilaterally    MEDICATIONS  (STANDING):  ALBUTerol/ipratropium for Nebulization 3 milliLiter(s) Nebulizer every 6 hours  artificial  tears Solution 1 Drop(s) Both EYES two times a day  aspirin  chewable 81 milliGRAM(s) Oral daily  atorvastatin 20 milliGRAM(s) Oral at bedtime  benzonatate 100 milliGRAM(s) Oral three times a day  cholecalciferol 1000 Unit(s) Oral daily  docusate sodium 100 milliGRAM(s) Oral two times a day  enoxaparin Injectable 70 milliGRAM(s) SubCutaneous every 12 hours  insulin lispro (HumaLOG) corrective regimen sliding scale   SubCutaneous three times a day before meals  losartan 25 milliGRAM(s) Oral daily  montelukast 10 milliGRAM(s) Oral daily  pantoprazole    Tablet 40 milliGRAM(s) Oral before breakfast  senna 2 Tablet(s) Oral at bedtime      TELEMETRY: 	  sr 40's-60's    	  LABS:	 	                            10.6   7.6   )-----------( 176      ( 18 Nov 2018 06:49 )             33.7     11-18    140  |  106  |  28<H>  ----------------------------<  122<H>  4.1   |  26  |  0.87    Ca    9.0      18 Nov 2018 06:49      proBNP: Serum Pro-Brain Natriuretic Peptide: 66 pg/mL (11-12 @ 12:09)    Lipid Profile: Cholesterol 168  LDL 63  HDL 68      HgA1c: Hemoglobin A1C, Whole Blood: 6.6 % (11-13 @ 10:01)    TSH: Thyroid Stimulating Hormone, Serum: 1.49 uU/mL (11-13 @ 06:46)
CHIEF COMPLAINT:Patient is a 89y old  Female who presents with a chief complaint of cough, shortness of breath for the past 2 weeks .Pt still having cough.    	  REVIEW OF SYSTEMS:  CONSTITUTIONAL: No fever, weight loss, or fatigue  EYES: No eye pain, visual disturbances, or discharge  ENT:  No difficulty hearing, tinnitus, vertigo; No sinus or throat pain  NECK: No pain or stiffness  RESPIRATORY: No cough, wheezing, chills or hemoptysis; No Shortness of Breath  CARDIOVASCULAR: No chest pain, palpitations, passing out, dizziness, or leg swelling  GASTROINTESTINAL: No abdominal or epigastric pain. No nausea, vomiting, or hematemesis; No diarrhea or constipation. No melena or hematochezia.  GENITOURINARY: No dysuria, frequency, hematuria, or incontinence  NEUROLOGICAL: No headaches, memory loss, loss of strength, numbness, or tremors  SKIN: No itching, burning, rashes, or lesions   LYMPH Nodes: No enlarged glands  ENDOCRINE: No heat or cold intolerance; No hair loss  MUSCULOSKELETAL: No joint pain or swelling; No muscle, back, or extremity pain  PSYCHIATRIC: No depression, anxiety, mood swings, or difficulty sleeping  HEME/LYMPH: No easy bruising, or bleeding gums  ALLERGY AND IMMUNOLOGIC: No hives or eczema	      PHYSICAL EXAM:  T(C): 36.9 (11-14-18 @ 07:42), Max: 36.9 (11-13-18 @ 11:21)  HR: 54 (11-14-18 @ 07:42) (54 - 70)  BP: 125/57 (11-14-18 @ 07:42) (109/49 - 136/50)  RR: 18 (11-14-18 @ 07:42) (18 - 18)  SpO2: 99% (11-14-18 @ 07:42) (96% - 100%)  Wt(kg): --  I&O's Summary    13 Nov 2018 07:01  -  14 Nov 2018 07:00  --------------------------------------------------------  IN: 250 mL / OUT: 0 mL / NET: 250 mL        Appearance: Normal	  HEENT:   Normal oral mucosa, PERRL, EOMI	  Lymphatic: No lymphadenopathy  Cardiovascular: Normal S1 S2, No JVD, No murmurs, No edema  Respiratory: Lungs clear to auscultation	  Psychiatry: A & O x 3, Mood & affect appropriate  Gastrointestinal:  Soft, Non-tender, + BS	  Skin: No rashes, No ecchymoses, No cyanosis	  Neurologic: Non-focal  Extremities: Normal range of motion, No clubbing, cyanosis or edema  Vascular: Peripheral pulses palpable 2+ bilaterally    MEDICATIONS  (STANDING):  ALBUTerol/ipratropium for Nebulization 3 milliLiter(s) Nebulizer every 6 hours  amiodarone    Tablet 400 milliGRAM(s) Oral two times a day  aspirin  chewable 81 milliGRAM(s) Oral daily  atorvastatin 20 milliGRAM(s) Oral at bedtime  benzonatate 100 milliGRAM(s) Oral three times a day  docusate sodium 100 milliGRAM(s) Oral two times a day  enoxaparin Injectable 70 milliGRAM(s) SubCutaneous every 12 hours  insulin lispro (HumaLOG) corrective regimen sliding scale   SubCutaneous three times a day before meals  losartan 25 milliGRAM(s) Oral daily  montelukast 10 milliGRAM(s) Oral daily  pantoprazole    Tablet 40 milliGRAM(s) Oral before breakfast  predniSONE   Tablet 40 milliGRAM(s) Oral daily      TELEMETRY: 	paf 110's, nsr in 40-50's with pauses under 3 sec      	  LABS:	 	    CARDIAC MARKERS:  CARDIAC MARKERS ( 13 Nov 2018 06:46 )  0.018 ng/mL / x     / 42 U/L / x     / 1.0 ng/mL  CARDIAC MARKERS ( 12 Nov 2018 21:02 )  0.020 ng/mL / x     / x     / x     / x      CARDIAC MARKERS ( 12 Nov 2018 12:09 )  <0.015 ng/mL / x     / 44 U/L / x     / x                             11.5   6.6   )-----------( 192      ( 14 Nov 2018 06:57 )             36.5     11-14    142  |  106  |  22<H>  ----------------------------<  122<H>  3.7   |  26  |  0.84    Ca    8.9      14 Nov 2018 06:57  Phos  4.2     11-13  Mg     2.1     11-13    TPro  8.0  /  Alb  3.8  /  TBili  0.3  /  DBili  x   /  AST  20  /  ALT  26  /  AlkPhos  86  11-12    proBNP: Serum Pro-Brain Natriuretic Peptide: 66 pg/mL (11-12 @ 12:09)    Lipid Profile: Cholesterol 168  LDL 63  HDL 68      HgA1c: Hemoglobin A1C, Whole Blood: 6.6 % (11-13 @ 10:01)    TSH: Thyroid Stimulating Hormone, Serum: 1.49 uU/mL (11-13 @ 06:46)      	  Rapid Respiratory Viral Panel (11.12.18 @ 12:59)    Rapid RVP Result: [facility]: The [employer] RVP Rapid uses polymerase chain reaction (PCR) and melt  curve analysis to screen for adenovirus; coronavirus HKU1, NL63, 229E,  OC43; human metapneumovirus (hMPV); human enterovirus/rhinovirus  (Entero/RV); influenza A; influenza A/H1;influenza A/H3; influenza  A/H1-2009; influenza B; parainfluenza viruses 1, 2, 3, 4; respiratory  syncytial virus; Bordetella pertussis; Mycoplasma pneumoniae; and  Chlamydophila pneumoniae.      OBSERVATIONS:  Mitral Valve: Normal mitral valve. Trace mitral  regurgitation.  Aortic Root: Normal aortic root.  Aortic Valve: Calcified aortic valve with normal opening.  Left Atrium: Normal left atrium.  LA volume index = 24  cc/m2.  Left Ventricle: Endocardium not well visualized; grossly  normal left ventricular systolic function. Segmental wall  motion could not be assessed. Normal left ventricular  internal dimensions and wall thicknesses.  Right Heart: Normal right atrium. Normal right ventricular  size and function. There is mild tricuspid regurgitation.  There is mild pulmonic regurgitation.  Pericardium/PleuraNormal pericardium with no pericardial  effusion.  Hemodynamic: RA Pressure is 8 mm Hg. RV systolic pressure  is33 mm Hg.      IMPRESSIONS:Normal Study  * Negative ECG evidence of ischemia after IV of Lexiscan.  * Review of raw data shows: The study is of good technical  quality.  * The left ventricle was normal in size. Normal myocardial  perfusion scan, with no evidence of infarction or  inducible ischemia.  * Post-stress resting myocardial perfusion gated SPECT  imaging was performed (LVEF > 70%)    ------------------------------------------------------------------------      ------------------------------------------------------------------------    Confirmed on  1/8/2018 - 13:25:30 at [place] by  Sully Patel MD
CHIEF COMPLAINT:Patient is a 89y old  Female who presents with a chief complaint of cough, shortness of breath for the past 2 weeks. Pt appears comfortable.    	  REVIEW OF SYSTEMS:  CONSTITUTIONAL: No fever, weight loss, or fatigue  EYES: No eye pain, visual disturbances, or discharge  ENT:  No difficulty hearing, tinnitus, vertigo; No sinus or throat pain  NECK: No pain or stiffness  RESPIRATORY: No cough, wheezing, chills or hemoptysis; No Shortness of Breath  CARDIOVASCULAR: No chest pain, palpitations, passing out, dizziness, or leg swelling  GASTROINTESTINAL: No abdominal or epigastric pain. No nausea, vomiting, or hematemesis; No diarrhea or constipation. No melena or hematochezia.  GENITOURINARY: No dysuria, frequency, hematuria, or incontinence  NEUROLOGICAL: No headaches, memory loss, loss of strength, numbness, or tremors  SKIN: No itching, burning, rashes, or lesions   LYMPH Nodes: No enlarged glands  ENDOCRINE: No heat or cold intolerance; No hair loss  MUSCULOSKELETAL: No joint pain or swelling; No muscle, back, or extremity pain  PSYCHIATRIC: No depression, anxiety, mood swings, or difficulty sleeping  HEME/LYMPH: No easy bruising, or bleeding gums  ALLERGY AND IMMUNOLOGIC: No hives or eczema	    PHYSICAL EXAM:  T(C): 36.8 (11-18-18 @ 07:18), Max: 36.8 (11-18-18 @ 07:18)  HR: 51 (11-18-18 @ 07:18) (50 - 64)  BP: 123/48 (11-18-18 @ 07:18) (110/41 - 136/43)  RR: 18 (11-18-18 @ 07:18) (17 - 18)  SpO2: 98% (11-18-18 @ 07:18) (96% - 99%)  Wt(kg): --  I&O's Summary    17 Nov 2018 07:01  -  18 Nov 2018 07:00  --------------------------------------------------------  IN: 200 mL / OUT: 450 mL / NET: -250 mL        Appearance: Normal	  HEENT:   Normal oral mucosa, PERRL, EOMI	  Lymphatic: No lymphadenopathy  Cardiovascular: Normal S1 S2, No JVD, No murmurs, No edema  Respiratory: Lungs clear to auscultation	  Psychiatry: A & O x 3, Mood & affect appropriate  Gastrointestinal:  Soft, Non-tender, + BS	  Skin: No rashes, No ecchymoses, No cyanosis	  Neurologic: Non-focal  Extremities: Normal range of motion, No clubbing, cyanosis or edema  Vascular: Peripheral pulses palpable 2+ bilaterally    MEDICATIONS  (STANDING):  ALBUTerol/ipratropium for Nebulization 3 milliLiter(s) Nebulizer every 6 hours  amiodarone    Tablet 400 milliGRAM(s) Oral two times a day  aspirin  chewable 81 milliGRAM(s) Oral daily  atorvastatin 20 milliGRAM(s) Oral at bedtime  benzonatate 100 milliGRAM(s) Oral three times a day  cholecalciferol 1000 Unit(s) Oral daily  docusate sodium 100 milliGRAM(s) Oral two times a day  enoxaparin Injectable 70 milliGRAM(s) SubCutaneous every 12 hours  insulin lispro (HumaLOG) corrective regimen sliding scale   SubCutaneous three times a day before meals  losartan 25 milliGRAM(s) Oral daily  montelukast 10 milliGRAM(s) Oral daily  pantoprazole    Tablet 40 milliGRAM(s) Oral before breakfast  senna 2 Tablet(s) Oral at bedtime      TELEMETRY: 	 40-60's      	  LABS:	 	                        10.6   7.6   )-----------( 176      ( 18 Nov 2018 06:49 )             33.7     11-18    140  |  106  |  28<H>  ----------------------------<  122<H>  4.1   |  26  |  0.87    Ca    9.0      18 Nov 2018 06:49      proBNP: Serum Pro-Brain Natriuretic Peptide: 66 pg/mL (11-12 @ 12:09)    Lipid Profile: Cholesterol 168  LDL 63  HDL 68      HgA1c: Hemoglobin A1C, Whole Blood: 6.6 % (11-13 @ 10:01)    TSH: Thyroid Stimulating Hormone, Serum: 1.49 uU/mL (11-13 @ 06:46)
CHIEF COMPLAINT:Patient is a 89y old  Female who presents with a chief complaint of cough, shortness of breath for the past 2 weeks.Pt appears comfortable.    	  REVIEW OF SYSTEMS:  CONSTITUTIONAL: No fever, weight loss, or fatigue  EYES: No eye pain, visual disturbances, or discharge  ENT:  No difficulty hearing, tinnitus, vertigo; No sinus or throat pain  NECK: No pain or stiffness  RESPIRATORY: No cough, wheezing, chills or hemoptysis; No Shortness of Breath  CARDIOVASCULAR: No chest pain, palpitations, passing out, dizziness, or leg swelling  GASTROINTESTINAL: No abdominal or epigastric pain. No nausea, vomiting, or hematemesis; No diarrhea or constipation. No melena or hematochezia.  GENITOURINARY: No dysuria, frequency, hematuria, or incontinence  NEUROLOGICAL: No headaches, memory loss, loss of strength, numbness, or tremors  SKIN: No itching, burning, rashes, or lesions   LYMPH Nodes: No enlarged glands  ENDOCRINE: No heat or cold intolerance; No hair loss  MUSCULOSKELETAL: No joint pain or swelling; No muscle, back, or extremity pain  PSYCHIATRIC: No depression, anxiety, mood swings, or difficulty sleeping  HEME/LYMPH: No easy bruising, or bleeding gums  ALLERGY AND IMMUNOLOGIC: No hives or eczema	      PHYSICAL EXAM:  T(C): 36.4 (11-16-18 @ 08:12), Max: 37 (11-15-18 @ 23:41)  HR: 63 (11-16-18 @ 08:12) (52 - 88)  BP: 138/48 (11-16-18 @ 08:12) (120/61 - 150/52)  RR: 17 (11-16-18 @ 08:12) (17 - 18)  SpO2: 98% (11-16-18 @ 08:12) (95% - 100%)    I&O's Summary    15 Nov 2018 07:01  -  16 Nov 2018 07:00  --------------------------------------------------------  IN: 250 mL / OUT: 0 mL / NET: 250 mL        Appearance: Normal	  HEENT:   Normal oral mucosa, PERRL, EOMI	  Lymphatic: No lymphadenopathy  Cardiovascular: Normal S1 S2, No JVD, No murmurs, No edema  Respiratory: Lungs clear to auscultation	  Psychiatry: A & O x 3, Mood & affect appropriate  Gastrointestinal:  Soft, Non-tender, + BS	  Skin: No rashes, No ecchymoses, No cyanosis	  Neurologic: Non-focal  Extremities: Normal range of motion, No clubbing, cyanosis or edema  Vascular: Peripheral pulses palpable 2+ bilaterally    MEDICATIONS  (STANDING):  ALBUTerol/ipratropium for Nebulization 3 milliLiter(s) Nebulizer every 6 hours  amiodarone    Tablet 400 milliGRAM(s) Oral two times a day  aspirin  chewable 81 milliGRAM(s) Oral daily  atorvastatin 20 milliGRAM(s) Oral at bedtime  benzonatate 100 milliGRAM(s) Oral three times a day  cholecalciferol 1000 Unit(s) Oral daily  docusate sodium 100 milliGRAM(s) Oral two times a day  enoxaparin Injectable 70 milliGRAM(s) SubCutaneous every 12 hours  insulin lispro (HumaLOG) corrective regimen sliding scale   SubCutaneous three times a day before meals  losartan 25 milliGRAM(s) Oral daily  montelukast 10 milliGRAM(s) Oral daily  pantoprazole    Tablet 40 milliGRAM(s) Oral before breakfast  predniSONE   Tablet 40 milliGRAM(s) Oral daily  senna 2 Tablet(s) Oral at bedtime      TELEMETRY: 	nsr with pac's      	  LABS:	 	                       11.4   7.9   )-----------( 190      ( 16 Nov 2018 06:55 )             37.0     11-16    142  |  106  |  24<H>  ----------------------------<  109<H>  3.8   |  27  |  0.85    Ca    9.3      16 Nov 2018 06:55      proBNP: Serum Pro-Brain Natriuretic Peptide: 66 pg/mL (11-12 @ 12:09)    Lipid Profile: Cholesterol 168  LDL 63  HDL 68      HgA1c: Hemoglobin A1C, Whole Blood: 6.6 % (11-13 @ 10:01)    TSH: Thyroid Stimulating Hormone, Serum: 1.49 uU/mL (11-13 @ 06:46)
CHIEF COMPLAINT:Patient is a 89y old  Female who presents with a chief complaint of cough, shortness of breath for the past 2 weeks.Pt appears comfortable.    	  REVIEW OF SYSTEMS:  CONSTITUTIONAL: No fever, weight loss, or fatigue  EYES: No eye pain, visual disturbances, or discharge  ENT:  No difficulty hearing, tinnitus, vertigo; No sinus or throat pain  NECK: No pain or stiffness  RESPIRATORY: No cough, wheezing, chills or hemoptysis; No Shortness of Breath  CARDIOVASCULAR: No chest pain, palpitations, passing out, dizziness, or leg swelling  GASTROINTESTINAL: No abdominal or epigastric pain. No nausea, vomiting, or hematemesis; No diarrhea or constipation. No melena or hematochezia.  GENITOURINARY: No dysuria, frequency, hematuria, or incontinence  NEUROLOGICAL: No headaches, memory loss, loss of strength, numbness, or tremors  SKIN: No itching, burning, rashes, or lesions   LYMPH Nodes: No enlarged glands  ENDOCRINE: No heat or cold intolerance; No hair loss  MUSCULOSKELETAL: No joint pain or swelling; No muscle, back, or extremity pain  PSYCHIATRIC: No depression, anxiety, mood swings, or difficulty sleeping  HEME/LYMPH: No easy bruising, or bleeding gums  ALLERGY AND IMMUNOLOGIC: No hives or eczema	    PHYSICAL EXAM:  T(C): 36.6 (11-17-18 @ 07:09), Max: 37.2 (11-17-18 @ 04:41)  HR: 55 (11-17-18 @ 07:09) (55 - 72)  BP: 132/46 (11-17-18 @ 07:09) (115/62 - 132/46)  RR: 18 (11-17-18 @ 07:09) (17 - 18)  SpO2: 100% (11-17-18 @ 07:09) (98% - 100%)  Wt(kg): --  I&O's Summary      Appearance: Normal	  HEENT:   Normal oral mucosa, PERRL, EOMI	  Lymphatic: No lymphadenopathy  Cardiovascular: Normal S1 S2, No JVD, No murmurs, No edema  Respiratory: Lungs clear to auscultation	  Psychiatry: A & O x 3, Mood & affect appropriate  Gastrointestinal:  Soft, Non-tender, + BS	  Skin: No rashes, No ecchymoses, No cyanosis	  Neurologic: Non-focal  Extremities: Normal range of motion, No clubbing, cyanosis or edema  Vascular: Peripheral pulses palpable 2+ bilaterally    MEDICATIONS  (STANDING):  ALBUTerol/ipratropium for Nebulization 3 milliLiter(s) Nebulizer every 6 hours  amiodarone    Tablet 400 milliGRAM(s) Oral two times a day  aspirin  chewable 81 milliGRAM(s) Oral daily  atorvastatin 20 milliGRAM(s) Oral at bedtime  benzonatate 100 milliGRAM(s) Oral three times a day  cholecalciferol 1000 Unit(s) Oral daily  docusate sodium 100 milliGRAM(s) Oral two times a day  enoxaparin Injectable 70 milliGRAM(s) SubCutaneous every 12 hours  insulin lispro (HumaLOG) corrective regimen sliding scale   SubCutaneous three times a day before meals  losartan 25 milliGRAM(s) Oral daily  montelukast 10 milliGRAM(s) Oral daily  pantoprazole    Tablet 40 milliGRAM(s) Oral before breakfast  senna 2 Tablet(s) Oral at bedtime      TELEMETRY: sr 40-60's, blocked pac's	      	  LABS:	 	                       11.1   8.4   )-----------( 198      ( 17 Nov 2018 06:27 )             35.0     11-17    138  |  105  |  32<H>  ----------------------------<  128<H>  3.9   |  25  |  1.04    Ca    9.1      17 Nov 2018 06:27      proBNP: Serum Pro-Brain Natriuretic Peptide: 66 pg/mL (11-12 @ 12:09)    Lipid Profile: Cholesterol 168  LDL 63  HDL 68      HgA1c: Hemoglobin A1C, Whole Blood: 6.6 % (11-13 @ 10:01)    TSH: Thyroid Stimulating Hormone, Serum: 1.49 uU/mL (11-13 @ 06:46)
PGY 1 Note discussed with supervising resident and primary attending    Patient is a 89y old  Female who presents with a chief complaint of cough, shortness of breath for the past 2 weeks (18 Nov 2018 15:09)      INTERVAL HPI/OVERNIGHT EVENTS: offers no new complaints; current symptoms resolving    MEDICATIONS  (STANDING):  ALBUTerol/ipratropium for Nebulization 3 milliLiter(s) Nebulizer every 6 hours  amiodarone    Tablet 400 milliGRAM(s) Oral two times a day  artificial  tears Solution 1 Drop(s) Both EYES two times a day  aspirin  chewable 81 milliGRAM(s) Oral daily  atorvastatin 20 milliGRAM(s) Oral at bedtime  benzonatate 100 milliGRAM(s) Oral three times a day  cholecalciferol 1000 Unit(s) Oral daily  docusate sodium 100 milliGRAM(s) Oral two times a day  enoxaparin Injectable 70 milliGRAM(s) SubCutaneous every 12 hours  insulin lispro (HumaLOG) corrective regimen sliding scale   SubCutaneous three times a day before meals  losartan 25 milliGRAM(s) Oral daily  montelukast 10 milliGRAM(s) Oral daily  pantoprazole    Tablet 40 milliGRAM(s) Oral before breakfast  senna 2 Tablet(s) Oral at bedtime    MEDICATIONS  (PRN):      __________________________________________________  REVIEW OF SYSTEMS:    CONSTITUTIONAL: No fever,   EYES: no acute visual disturbances  NECK: No pain or stiffness  RESPIRATORY: No cough; No shortness of breath  CARDIOVASCULAR: No chest pain, no palpitations  GASTROINTESTINAL: No pain. No nausea or vomiting; No diarrhea   NEUROLOGICAL: No headache or numbness, no tremors  MUSCULOSKELETAL: No joint pain, no muscle pain  GENITOURINARY: no dysuria, no frequency, no hesitancy  PSYCHIATRY: no depression , no anxiety  ALL OTHER  ROS negative        Vital Signs Last 24 Hrs  T(C): 36.5 (19 Nov 2018 00:39), Max: 36.9 (18 Nov 2018 11:20)  T(F): 97.7 (19 Nov 2018 00:39), Max: 98.5 (18 Nov 2018 11:20)  HR: 68 (19 Nov 2018 00:39) (50 - 68)  BP: 137/45 (19 Nov 2018 00:39) (123/43 - 158/48)  BP(mean): --  RR: 18 (19 Nov 2018 00:39) (16 - 18)  SpO2: 98% (19 Nov 2018 00:39) (96% - 100%)    ________________________________________________  PHYSICAL EXAM:  GENERAL: NAD  HEENT: Normocephalic;  conjunctivae and sclerae clear; moist mucous membranes;   NECK : supple  CHEST/LUNG: Clear to auscultation bilaterally with good air entry   HEART: S1 S2  regular; no murmurs, gallops or rubs  ABDOMEN: Soft, Nontender, Nondistended; Bowel sounds present  EXTREMITIES: no cyanosis; no edema; no calf tenderness  SKIN: warm and dry; no rash  NERVOUS SYSTEM:  Awake and alert; Oriented  to place, person and time ; no new deficits    _________________________________________________  LABS:                        10.6   7.6   )-----------( 176      ( 18 Nov 2018 06:49 )             33.7     11-18    140  |  106  |  28<H>  ----------------------------<  122<H>  4.1   |  26  |  0.87    Ca    9.0      18 Nov 2018 06:49          CAPILLARY BLOOD GLUCOSE      POCT Blood Glucose.: 182 mg/dL (18 Nov 2018 21:28)  POCT Blood Glucose.: 132 mg/dL (18 Nov 2018 16:44)  POCT Blood Glucose.: 176 mg/dL (18 Nov 2018 11:42)  POCT Blood Glucose.: 147 mg/dL (18 Nov 2018 07:46)        RADIOLOGY & ADDITIONAL TESTS:    Imaging Personally Reviewed:  YES/NO    Consultant(s) Notes Reviewed:   YES/ No    Care Discussed with Consultants :     Plan of care was discussed with patient and /or primary care giver; all questions and concerns were addressed and care was aligned with patient's wishes.
PGY1 Note discussed with Supervising Resident and Primary Attending.    Patient is a 89y old  Female who presents with a chief complaint of cough, shortness of breath for the past 2 weeks (13 Nov 2018 08:38)      INTERVAL HPI/OVERNIGHT EVENTS :  Patient seen and examined at bedside. Going elliot into 30s on tele but is asymptomatic. Reports that she gets dizzy and SOB sometimes from coughing but otherwise is feeling ok. Patient denies nausea, vomiting, diarrhea, chest pain, SOB, headache.    MEDICATIONS  (STANDING):  ALBUTerol/ipratropium for Nebulization 3 milliLiter(s) Nebulizer every 6 hours  aspirin  chewable 81 milliGRAM(s) Oral daily  atorvastatin 20 milliGRAM(s) Oral at bedtime  docusate sodium 100 milliGRAM(s) Oral two times a day  enoxaparin Injectable 70 milliGRAM(s) SubCutaneous every 12 hours  guaiFENesin   Syrup  (Sugar-Free) 200 milliGRAM(s) Oral every 6 hours  insulin lispro (HumaLOG) corrective regimen sliding scale   SubCutaneous three times a day before meals  losartan 25 milliGRAM(s) Oral daily  metoprolol tartrate 12.5 milliGRAM(s) Oral two times a day  montelukast 10 milliGRAM(s) Oral daily  pantoprazole    Tablet 40 milliGRAM(s) Oral before breakfast    MEDICATIONS  (PRN):      Allergies    No Known Allergies    Intolerances        REVIEW OF SYSTEMS :  * General: denies chills, fatigue  * Eyes: denies vision changes  * Respiratory: reports cough during which she gets dizzy and SOB, but otherwise denies SOB, wheezing  * Cardio: denies chest pain, palpitations  * GI: denies abd pain, nausea, vomiting, diarrhea  * : denies dysuria  * Neuro: denies headaches, numbness, weakness  * MSK: denies joint pain  * Skin: denies itching, rashes    Vital Signs Last 24 Hrs  T(C): 36.9 (13 Nov 2018 11:21), Max: 37.2 (12 Nov 2018 19:19)  T(F): 98.4 (13 Nov 2018 11:21), Max: 99 (12 Nov 2018 19:19)  HR: 56 (13 Nov 2018 11:21) (54 - 91)  BP: 119/42 (13 Nov 2018 11:21) (112/46 - 147/62)  BP(mean): --  RR: 18 (13 Nov 2018 11:21) (16 - 18)  SpO2: 99% (13 Nov 2018 11:21) (93% - 99%)    PHYSICAL EXAM :  * General: no acute distress, well-nourished, well-developed  * HEENT: atraumatic, normocephalic; moist mucus membranes; supple neck; no JVD  * CN: EOMI, PERRL  * Respiratory: cta b/l; no wheezes, rales, rhonchi  * Cardio: irregularly irregular; no appreciable murmurs, rubs, gallops  * Abd: soft, nontender, nondistended, +BS  * Neuro: AAOx3; strength 5/5; sensation intact throughout  * Extremities: no clubbing, cyanosis, edema  * Skin: no rashes    LABS:                          12.1   6.3   )-----------( 207      ( 13 Nov 2018 06:46 )             38.6     11-13    141  |  105  |  17  ----------------------------<  140<H>  3.7   |  25  |  0.76    Ca    9.5      13 Nov 2018 06:46  Phos  4.2     11-13  Mg     2.1     11-13    TPro  8.0  /  Alb  3.8  /  TBili  0.3  /  DBili  x   /  AST  20  /  ALT  26  /  AlkPhos  86  11-12        CAPILLARY BLOOD GLUCOSE      POCT Blood Glucose.: 136 mg/dL (13 Nov 2018 07:58)  POCT Blood Glucose.: 110 mg/dL (12 Nov 2018 22:07)  POCT Blood Glucose.: 140 mg/dL (12 Nov 2018 18:41)      RADIOLOGY & ADDITIONAL TESTS:   no new imaging    Consultant(s) Notes Reviewed: yes
PGY1 Note discussed with Supervising Resident and Primary Attending.    Patient is a 89y old  Female who presents with a chief complaint of cough, shortness of breath for the past 2 weeks (14 Nov 2018 08:22)      INTERVAL HPI/OVERNIGHT EVENTS :  Patient seen and examined at bedside. Some pauses and elliot on tele overnight. She complains of cough associated with dizziness when she coughs a lot as well as some SOB during coughing fits. Patient denies nausea, vomiting, diarrhea, chest pain, headache.    MEDICATIONS  (STANDING):  ALBUTerol/ipratropium for Nebulization 3 milliLiter(s) Nebulizer every 6 hours  amiodarone    Tablet 400 milliGRAM(s) Oral two times a day  aspirin  chewable 81 milliGRAM(s) Oral daily  atorvastatin 20 milliGRAM(s) Oral at bedtime  benzonatate 100 milliGRAM(s) Oral three times a day  docusate sodium 100 milliGRAM(s) Oral two times a day  enoxaparin Injectable 70 milliGRAM(s) SubCutaneous every 12 hours  insulin lispro (HumaLOG) corrective regimen sliding scale   SubCutaneous three times a day before meals  losartan 25 milliGRAM(s) Oral daily  montelukast 10 milliGRAM(s) Oral daily  pantoprazole    Tablet 40 milliGRAM(s) Oral before breakfast  predniSONE   Tablet 40 milliGRAM(s) Oral daily    MEDICATIONS  (PRN):      Allergies    No Known Allergies    Intolerances        REVIEW OF SYSTEMS :  * General: denies chills, fatigue  * Eyes: denies vision changes  * Respiratory: reports cough associated with SOB; denies wheezing  * Cardio: denies chest pain, palpitations  * GI: denies abd pain, nausea, vomiting, diarrhea  * : denies dysuria  * Neuro: reports dizziness during coughing fits; denies headaches, numbness, weakness  * MSK: denies joint pain  * Skin: denies itching, rashes    Vital Signs Last 24 Hrs  T(C): 36.4 (14 Nov 2018 11:18), Max: 36.9 (14 Nov 2018 07:42)  T(F): 97.5 (14 Nov 2018 11:18), Max: 98.5 (14 Nov 2018 07:42)  HR: 57 (14 Nov 2018 11:18) (54 - 70)  BP: 119/43 (14 Nov 2018 11:18) (109/49 - 136/50)  BP(mean): --  RR: 18 (14 Nov 2018 11:18) (18 - 18)  SpO2: 96% (14 Nov 2018 11:18) (96% - 100%)    PHYSICAL EXAM :  * General: no acute distress, well-nourished, well-developed  * HEENT: atraumatic, normocephalic; moist mucus membranes; supple neck; no JVD  * CN: EOMI, PERRL  * Respiratory: cta b/l; no wheezes, rales, rhonchi  * Cardio: irregularly irregular; no appreciable murmurs, rubs, gallops  * Abd: soft, nontender, nondistended, +BS  * Neuro: AAOx3; strength 5/5; sensation intact throughout  * Extremities: no clubbing, cyanosis, edema  * Skin: no rashes    LABS:                          11.5   6.6   )-----------( 192      ( 14 Nov 2018 06:57 )             36.5     11-14    142  |  106  |  22<H>  ----------------------------<  122<H>  3.7   |  26  |  0.84    Ca    8.9      14 Nov 2018 06:57  Phos  4.2     11-13  Mg     2.1     11-13    TPro  8.0  /  Alb  3.8  /  TBili  0.3  /  DBili  x   /  AST  20  /  ALT  26  /  AlkPhos  86  11-12        CAPILLARY BLOOD GLUCOSE      POCT Blood Glucose.: 136 mg/dL (14 Nov 2018 07:45)  POCT Blood Glucose.: 146 mg/dL (13 Nov 2018 21:10)  POCT Blood Glucose.: 123 mg/dL (13 Nov 2018 16:35)  POCT Blood Glucose.: 137 mg/dL (13 Nov 2018 11:46)      RADIOLOGY & ADDITIONAL TESTS:   no new imaging    Consultant(s) Notes Reviewed: yes
PGY1 Note discussed with Supervising Resident and Primary Attending.    Patient is a 89y old  Female who presents with a chief complaint of cough, shortness of breath for the past 2 weeks (15 Nov 2018 09:22)      INTERVAL HPI/OVERNIGHT EVENTS :  No acute overnight events. Patient seen and examined at bedside. She reports that her cough is improving and her dizziness and SOB are gone. Day 2/5 of amio loading. Denies chest pain, nausea, vomiting, dizziness, SOB, headache, numbness, weakness.    MEDICATIONS  (STANDING):  ALBUTerol/ipratropium for Nebulization 3 milliLiter(s) Nebulizer every 6 hours  amiodarone    Tablet 400 milliGRAM(s) Oral two times a day  aspirin  chewable 81 milliGRAM(s) Oral daily  atorvastatin 20 milliGRAM(s) Oral at bedtime  benzonatate 100 milliGRAM(s) Oral three times a day  docusate sodium 100 milliGRAM(s) Oral two times a day  enoxaparin Injectable 70 milliGRAM(s) SubCutaneous every 12 hours  insulin lispro (HumaLOG) corrective regimen sliding scale   SubCutaneous three times a day before meals  losartan 25 milliGRAM(s) Oral daily  montelukast 10 milliGRAM(s) Oral daily  pantoprazole    Tablet 40 milliGRAM(s) Oral before breakfast  predniSONE   Tablet 40 milliGRAM(s) Oral daily  senna 2 Tablet(s) Oral at bedtime    MEDICATIONS  (PRN):      Allergies    No Known Allergies    Intolerances        REVIEW OF SYSTEMS :  * General: denies chills, fatigue  * Eyes: denies vision changes  * Respiratory: reports cough, improving; denies SOB, wheezing  * Cardio: denies chest pain, palpitations  * GI: denies abd pain, nausea, vomiting, diarrhea  * : denies dysuria  * Neuro: denies headaches, numbness, weakness  * MSK: denies joint pain  * Skin: denies itching, rashes    Vital Signs Last 24 Hrs  T(C): 36.5 (15 Nov 2018 07:49), Max: 36.9 (14 Nov 2018 15:33)  T(F): 97.7 (15 Nov 2018 07:49), Max: 98.5 (14 Nov 2018 20:26)  HR: 62 (15 Nov 2018 07:49) (57 - 79)  BP: 127/62 (15 Nov 2018 07:49) (119/43 - 150/66)  BP(mean): --  RR: 18 (15 Nov 2018 07:49) (18 - 18)  SpO2: 98% (15 Nov 2018 07:49) (96% - 100%)    PHYSICAL EXAM :  * General: no acute distress, well-nourished, well-developed  * HEENT: atraumatic, normocephalic; moist mucus membranes; supple neck; no JVD  * CN: EOMI, PERRL  * Respiratory: cta b/l; no wheezes, rales, rhonchi  * Cardio: RRR; no appreciable murmurs, rubs, gallops  * Abd: soft, nontender, nondistended, +BS  * Neuro: AAOx3; strength 5/5; sensation intact throughout  * Extremities: no clubbing, cyanosis, edema  * Skin: no rashes    LABS:                          10.9   5.6   )-----------( 181      ( 15 Nov 2018 07:30 )             34.8     11-15    139  |  104  |  23<H>  ----------------------------<  136<H>  4.0   |  27  |  0.72    Ca    9.0      15 Nov 2018 07:30          CAPILLARY BLOOD GLUCOSE      POCT Blood Glucose.: 138 mg/dL (15 Nov 2018 07:54)  POCT Blood Glucose.: 196 mg/dL (14 Nov 2018 21:08)  POCT Blood Glucose.: 162 mg/dL (14 Nov 2018 16:37)  POCT Blood Glucose.: 122 mg/dL (14 Nov 2018 11:44)      RADIOLOGY & ADDITIONAL TESTS:   no new imaging    Consultant(s) Notes Reviewed: yes
PGY1 Note discussed with Supervising Resident and Primary Attending.    Patient is a 89y old  Female who presents with a chief complaint of cough, shortness of breath for the past 2 weeks (17 Nov 2018 09:03)      INTERVAL HPI/OVERNIGHT EVENTS :  No acute overnight events. Patient seen and examined at bedside. She reports that cough is improving. Denies dizziness, headache, SOB, chest pain, nausea, vomiting. Day 4/5 of amio loading.    MEDICATIONS  (STANDING):  ALBUTerol/ipratropium for Nebulization 3 milliLiter(s) Nebulizer every 6 hours  amiodarone    Tablet 400 milliGRAM(s) Oral two times a day  aspirin  chewable 81 milliGRAM(s) Oral daily  atorvastatin 20 milliGRAM(s) Oral at bedtime  benzonatate 100 milliGRAM(s) Oral three times a day  cholecalciferol 1000 Unit(s) Oral daily  docusate sodium 100 milliGRAM(s) Oral two times a day  enoxaparin Injectable 70 milliGRAM(s) SubCutaneous every 12 hours  insulin lispro (HumaLOG) corrective regimen sliding scale   SubCutaneous three times a day before meals  losartan 25 milliGRAM(s) Oral daily  montelukast 10 milliGRAM(s) Oral daily  pantoprazole    Tablet 40 milliGRAM(s) Oral before breakfast  senna 2 Tablet(s) Oral at bedtime    MEDICATIONS  (PRN):      Allergies    No Known Allergies    Intolerances        REVIEW OF SYSTEMS :  * General: denies chills, fatigue  * Eyes: denies vision changes  * Respiratory: reports cough, improving; denies SOB, wheezing  * Cardio: denies chest pain, palpitations  * GI: denies abd pain, nausea, vomiting, diarrhea  * : denies dysuria  * Neuro: denies headaches, numbness, weakness  * MSK: denies joint pain  * Skin: denies itching, rashes    Vital Signs Last 24 Hrs  T(C): 36.6 (17 Nov 2018 07:09), Max: 37.2 (17 Nov 2018 04:41)  T(F): 97.8 (17 Nov 2018 07:09), Max: 98.9 (17 Nov 2018 04:41)  HR: 55 (17 Nov 2018 07:09) (55 - 72)  BP: 132/46 (17 Nov 2018 07:09) (115/62 - 132/46)  BP(mean): --  RR: 18 (17 Nov 2018 07:09) (17 - 18)  SpO2: 100% (17 Nov 2018 07:09) (98% - 100%)    PHYSICAL EXAM :  * General: no acute distress, well-nourished, well-developed  * HEENT: atraumatic, normocephalic; moist mucus membranes; supple neck; no JVD  * CN: EOMI, PERRL  * Respiratory: cta b/l; no wheezes, rales, rhonchi  * Cardio: irregular; no appreciable murmurs, rubs, gallops  * Abd: soft, nontender, nondistended, +BS  * Neuro: AAOx3; strength 5/5; sensation intact throughout  * Extremities: no clubbing, cyanosis, edema  * Skin: no rashes    LABS:                          11.1   8.4   )-----------( 198      ( 17 Nov 2018 06:27 )             35.0     11-17    138  |  105  |  32<H>  ----------------------------<  128<H>  3.9   |  25  |  1.04    Ca    9.1      17 Nov 2018 06:27          CAPILLARY BLOOD GLUCOSE      POCT Blood Glucose.: 130 mg/dL (17 Nov 2018 07:53)  POCT Blood Glucose.: 141 mg/dL (16 Nov 2018 21:18)  POCT Blood Glucose.: 214 mg/dL (16 Nov 2018 16:48)  POCT Blood Glucose.: 238 mg/dL (16 Nov 2018 12:08)      RADIOLOGY & ADDITIONAL TESTS:   no new imaging    Consultant(s) Notes Reviewed: yes
PGY1 Note discussed with Supervising Resident and Primary Attending.    Patient is a 89y old  Female who presents with a chief complaint of cough, shortness of breath for the past 2 weeks (16 Nov 2018 09:27)      INTERVAL HPI/OVERNIGHT EVENTS :  No acute overnight events. Patient seen and examined at bedside. She reports that she is still having cough but no SOB or dizziness. Cough is somewhat improving. Denies chest pain, SOB, palpitations, headache, abd pain, nausea, vomiting. Will continue with prednisone for now. Day 3/5 of amio loading.    MEDICATIONS  (STANDING):  ALBUTerol/ipratropium for Nebulization 3 milliLiter(s) Nebulizer every 6 hours  amiodarone    Tablet 400 milliGRAM(s) Oral two times a day  aspirin  chewable 81 milliGRAM(s) Oral daily  atorvastatin 20 milliGRAM(s) Oral at bedtime  benzonatate 100 milliGRAM(s) Oral three times a day  cholecalciferol 1000 Unit(s) Oral daily  docusate sodium 100 milliGRAM(s) Oral two times a day  enoxaparin Injectable 70 milliGRAM(s) SubCutaneous every 12 hours  insulin lispro (HumaLOG) corrective regimen sliding scale   SubCutaneous three times a day before meals  losartan 25 milliGRAM(s) Oral daily  montelukast 10 milliGRAM(s) Oral daily  pantoprazole    Tablet 40 milliGRAM(s) Oral before breakfast  predniSONE   Tablet 40 milliGRAM(s) Oral daily  senna 2 Tablet(s) Oral at bedtime    MEDICATIONS  (PRN):      Allergies    No Known Allergies    Intolerances        REVIEW OF SYSTEMS :  * General: denies chills, fatigue  * Eyes: denies vision changes  * Respiratory: reports cough, improving; denies SOB, wheezing  * Cardio: denies chest pain, palpitations  * GI: denies abd pain, nausea, vomiting, diarrhea  * : denies dysuria  * Neuro: denies headaches, numbness, weakness  * MSK: denies joint pain  * Skin: denies itching, rashes    Vital Signs Last 24 Hrs  T(C): 36.4 (16 Nov 2018 08:12), Max: 37 (15 Nov 2018 23:41)  T(F): 97.6 (16 Nov 2018 08:12), Max: 98.6 (15 Nov 2018 23:41)  HR: 63 (16 Nov 2018 08:12) (52 - 88)  BP: 138/48 (16 Nov 2018 08:12) (120/61 - 150/52)  BP(mean): --  RR: 17 (16 Nov 2018 08:12) (17 - 18)  SpO2: 98% (16 Nov 2018 08:12) (95% - 100%)    PHYSICAL EXAM :  * General: no acute distress, well-nourished, well-developed  * HEENT: atraumatic, normocephalic; moist mucus membranes; supple neck; no JVD  * CN: EOMI, PERRL  * Respiratory: cta b/l; no wheezes, rales, rhonchi  * Cardio: RRR; no appreciable murmurs, rubs, gallops  * Abd: soft, nontender, nondistended, +BS  * Neuro: AAOx3; strength 5/5; sensation intact throughout  * Extremities: no clubbing, cyanosis, edema  * Skin: no rashes    LABS:                          11.4   7.9   )-----------( 190      ( 16 Nov 2018 06:55 )             37.0     11-16    142  |  106  |  24<H>  ----------------------------<  109<H>  3.8   |  27  |  0.85    Ca    9.3      16 Nov 2018 06:55          CAPILLARY BLOOD GLUCOSE      POCT Blood Glucose.: 155 mg/dL (16 Nov 2018 08:24)  POCT Blood Glucose.: 135 mg/dL (15 Nov 2018 21:06)  POCT Blood Glucose.: 220 mg/dL (15 Nov 2018 16:33)  POCT Blood Glucose.: 299 mg/dL (15 Nov 2018 11:41)      RADIOLOGY & ADDITIONAL TESTS:   no new imaging    Consultant(s) Notes Reviewed: yes
Patient is a 89y old  Female who presents with a chief complaint of cough, shortness of breath for the past 2 weeks (19 Nov 2018 08:46)      INTERVAL HPI/OVERNIGHT EVENTS: seen and examined, no complains     MEDICATIONS  (STANDING):  ALBUTerol/ipratropium for Nebulization 3 milliLiter(s) Nebulizer every 6 hours  artificial  tears Solution 1 Drop(s) Both EYES two times a day  aspirin  chewable 81 milliGRAM(s) Oral daily  atorvastatin 20 milliGRAM(s) Oral at bedtime  benzonatate 100 milliGRAM(s) Oral three times a day  cholecalciferol 1000 Unit(s) Oral daily  docusate sodium 100 milliGRAM(s) Oral two times a day  enoxaparin Injectable 70 milliGRAM(s) SubCutaneous every 12 hours  insulin lispro (HumaLOG) corrective regimen sliding scale   SubCutaneous three times a day before meals  losartan 25 milliGRAM(s) Oral daily  montelukast 10 milliGRAM(s) Oral daily  pantoprazole    Tablet 40 milliGRAM(s) Oral before breakfast  senna 2 Tablet(s) Oral at bedtime    MEDICATIONS  (PRN):      Allergies    No Known Allergies    Intolerances        REVIEW OF SYSTEMS:  CONSTITUTIONAL: No fever, weight loss, or fatigue  RESPIRATORY: No cough, wheezing, chills or hemoptysis; No shortness of breath  CARDIOVASCULAR: No chest pain, palpitations, dizziness, or leg swelling  GASTROINTESTINAL: No abdominal or epigastric pain. No nausea, vomiting, or hematemesis; No diarrhea or constipation. No melena or hematochezia.  NEUROLOGICAL: No headaches, memory loss, loss of strength, numbness, or tremors  MUSCULOSKELETAL: No joint pain or swelling; No muscle, back, or extremity pain      Vital Signs Last 24 Hrs  T(C): 36.6 (19 Nov 2018 15:29), Max: 36.9 (18 Nov 2018 20:10)  T(F): 97.8 (19 Nov 2018 15:29), Max: 98.4 (18 Nov 2018 20:10)  HR: 57 (19 Nov 2018 15:29) (52 - 68)  BP: 130/38 (19 Nov 2018 15:29) (129/40 - 158/48)  BP(mean): --  RR: 16 (19 Nov 2018 15:29) (16 - 18)  SpO2: 100% (19 Nov 2018 15:29) (98% - 100%)    PHYSICAL EXAM:  GENERAL: NAD, well-groomed, well-developed  HEAD:  Atraumatic, Normocephalic  EYES: conjunctiva and sclera clear  NECK: Supple, No JVD, Normal thyroid  NERVOUS SYSTEM:  Alert & Oriented X3, No gross focal deficits  CHEST/LUNG: Clear to percussion bilaterally; No rales, rhonchi, wheezing, or rubs  HEART: Regular rate and rhythm; No murmurs, rubs, or gallops  ABDOMEN: Soft, Nontender, Nondistended; Bowel sounds present  EXTREMITIES:  No clubbing, cyanosis, or edema  SKIN: No rashes or lesions    LABS:                        11.3   8.2   )-----------( 182      ( 19 Nov 2018 08:42 )             36.0     11-19    138  |  106  |  25<H>  ----------------------------<  148<H>  4.0   |  25  |  0.94    Ca    8.9      19 Nov 2018 08:42          CAPILLARY BLOOD GLUCOSE      POCT Blood Glucose.: 91 mg/dL (19 Nov 2018 11:40)  POCT Blood Glucose.: 156 mg/dL (19 Nov 2018 07:58)  POCT Blood Glucose.: 182 mg/dL (18 Nov 2018 21:28)      RADIOLOGY & ADDITIONAL TESTS:    Imaging Personally Reviewed:  [ ] YES  [ ] NO    Consultant(s) Notes Reviewed:  [ ] YES  [ ] NO    Care Discussed with Consultants/Other Providers [ ] YES  [ ] NO    Plan of Care discussed with Housestaff [ ]YES [ ] NO
Patient is a 89y old  Female who presents with a chief complaint of cough, shortness of breath for the past 2 weeks (18 Nov 2018 09:59)      INTERVAL HPI/OVERNIGHT EVENTS: seen and examined at bedside. No complains, no events.     MEDICATIONS  (STANDING):  ALBUTerol/ipratropium for Nebulization 3 milliLiter(s) Nebulizer every 6 hours  amiodarone    Tablet 400 milliGRAM(s) Oral two times a day  artificial  tears Solution 1 Drop(s) Both EYES two times a day  aspirin  chewable 81 milliGRAM(s) Oral daily  atorvastatin 20 milliGRAM(s) Oral at bedtime  benzonatate 100 milliGRAM(s) Oral three times a day  cholecalciferol 1000 Unit(s) Oral daily  docusate sodium 100 milliGRAM(s) Oral two times a day  enoxaparin Injectable 70 milliGRAM(s) SubCutaneous every 12 hours  insulin lispro (HumaLOG) corrective regimen sliding scale   SubCutaneous three times a day before meals  losartan 25 milliGRAM(s) Oral daily  montelukast 10 milliGRAM(s) Oral daily  pantoprazole    Tablet 40 milliGRAM(s) Oral before breakfast  senna 2 Tablet(s) Oral at bedtime    MEDICATIONS  (PRN):      Allergies    No Known Allergies    Intolerances        REVIEW OF SYSTEMS:  CONSTITUTIONAL: No fever, weight loss, or fatigue  RESPIRATORY: No cough, wheezing, chills or hemoptysis; No shortness of breath  CARDIOVASCULAR: No chest pain, palpitations, dizziness, or leg swelling  GASTROINTESTINAL: No abdominal or epigastric pain. No nausea, vomiting, or hematemesis; No diarrhea or constipation. No melena or hematochezia.  NEUROLOGICAL: No headaches, memory loss, loss of strength, numbness, or tremors  MUSCULOSKELETAL: No joint pain or swelling; No muscle, back, or extremity pain      Vital Signs Last 24 Hrs  T(C): 36.9 (18 Nov 2018 11:20), Max: 36.9 (18 Nov 2018 11:20)  T(F): 98.5 (18 Nov 2018 11:20), Max: 98.5 (18 Nov 2018 11:20)  HR: 64 (18 Nov 2018 11:20) (50 - 64)  BP: 123/43 (18 Nov 2018 11:20) (123/43 - 136/43)  BP(mean): --  RR: 16 (18 Nov 2018 11:20) (16 - 18)  SpO2: 100% (18 Nov 2018 11:20) (96% - 100%)    PHYSICAL EXAM:  GENERAL: NAD, well-groomed, well-developed  HEAD:  Atraumatic, Normocephalic  EYES: EOMI, PERRLA, conjunctiva and sclera clear  NECK: Supple, No JVD, Normal thyroid  NERVOUS SYSTEM:  Alert & Oriented X3, No gross focal deficits  CHEST/LUNG: Clear to percussion bilaterally; No rales, rhonchi, wheezing, or rubs  HEART: Regular rate and rhythm; No murmurs, rubs, or gallops  ABDOMEN: Soft, Nontender, Nondistended; Bowel sounds present  EXTREMITIES:  No clubbing, cyanosis, or edema  SKIN: No rashes or lesions    LABS:                        10.6   7.6   )-----------( 176      ( 18 Nov 2018 06:49 )             33.7     11-18    140  |  106  |  28<H>  ----------------------------<  122<H>  4.1   |  26  |  0.87    Ca    9.0      18 Nov 2018 06:49          CAPILLARY BLOOD GLUCOSE      POCT Blood Glucose.: 176 mg/dL (18 Nov 2018 11:42)  POCT Blood Glucose.: 147 mg/dL (18 Nov 2018 07:46)  POCT Blood Glucose.: 201 mg/dL (17 Nov 2018 20:35)  POCT Blood Glucose.: 133 mg/dL (17 Nov 2018 16:36)      RADIOLOGY & ADDITIONAL TESTS:    Imaging Personally Reviewed:  [ ] YES  [ ] NO    Consultant(s) Notes Reviewed:  [ ] YES  [ ] NO    Care Discussed with Consultants/Other Providers [ ] YES  [ ] NO    Plan of Care discussed with Housestaff [ ]YES [ ] NO

## 2018-11-19 NOTE — PROGRESS NOTE ADULT - PROBLEM SELECTOR PLAN 1
new onset; patient denies chest pain, however does admit to shortness of breath  c/w full dose lovenox for now  continue to monitor on telemetry  Tx3 negative  Elevated TGL  TSH wnl  Echo - EF 55-60%, grade 2 diastolic dysfunction, mild TR and CA  completed amio loading x5days   Called pharmacy -[phone], insurance covers eliquis, xarelto, and pradaxa  Cardio - Dr Patel new onset; patient denies chest pain, however does admit to shortness of breath  c/w full dose lovenox for now  continue to monitor on telemetry  Tx3 negative  Elevated TGL  TSH wnl  Echo - EF 55-60%, grade 2 diastolic dysfunction, mild TR and KY  completed amio loading x5days   discharged on amiodarone 200mg OD and eliquis 5mg bid.  advised to check tft's,lft's q3mo, cxr/pft/optho 1xyr.  Cardio - Dr Patel

## 2018-11-19 NOTE — DIETITIAN INITIAL EVALUATION ADULT. - NUTRITION INTERVENTION
----- Message from JO-ANN Cormier sent at 7/19/2018 12:14 PM CDT -----  Please notify of urine result. Unable to report due to AZO. Will order culture to follow.   Feeding Assistance/Collaboration and Referral of Nutrition Care/Meals and Snack

## 2018-11-19 NOTE — PROGRESS NOTE ADULT - PROBLEM SELECTOR PLAN 4
hold off oral hypoglycemic  c/w sliding scale for now  HbA1C 6.6  low vitamin D, starting supplement  b12 wnl
hold off oral hypoglycemic  c/w sliding scale for now  HbA1C 6.6  f/u B12 and vit D
hold off oral hypoglycemic  c/w sliding scale for now  HbA1C 6.6  f/u B12 and vit D
hold off oral hypoglycemic  c/w sliding scale for now  HbA1C 6.6  low vitamin D, starting supplement  b12 wnl
hold off oral hypoglycemic  c/w sliding scale for now  HbA1C 6.6  low vitamin D, starting supplement  b12 wnl
hold off oral hypoglycemic  c/w sliding scale for now  HbA1C 6.6  low vitamin D, starting supplement  f/u vit b12
hold off oral hypoglycemic  c/w sliding scale for now  HbA1C 6.6  low vitamin D, starting supplement  f/u vit b12
restart home meds

## 2018-11-19 NOTE — PROGRESS NOTE ADULT - REASON FOR ADMISSION
cough, shortness of breath for the past 2 weeks

## 2018-11-19 NOTE — PROGRESS NOTE ADULT - PROBLEM SELECTOR PROBLEM 3
Moderate asthma with acute exacerbation, unspecified whether persistent

## 2018-11-19 NOTE — PROGRESS NOTE ADULT - PROBLEM SELECTOR PLAN 3
c/w kwabena for now
c/w kwbaena for now
c/w kwabena for now
c/w kwabena for now

## 2018-11-19 NOTE — PROGRESS NOTE ADULT - PROBLEM SELECTOR PLAN 5
c/w low dose metoprolol, cozaar  d/c norvasc  continue to monitor BP c/w cozaar  d/c norvasc  continue to monitor BP

## 2018-11-19 NOTE — DIETITIAN INITIAL EVALUATION ADULT. - PERTINENT MEDS FT
reviewed MEDICATIONS  (STANDING):  ALBUTerol/ipratropium for Nebulization 3 milliLiter(s) Nebulizer every 6 hours  artificial  tears Solution 1 Drop(s) Both EYES two times a day  aspirin  chewable 81 milliGRAM(s) Oral daily  atorvastatin 20 milliGRAM(s) Oral at bedtime  benzonatate 100 milliGRAM(s) Oral three times a day  cholecalciferol 1000 Unit(s) Oral daily  docusate sodium 100 milliGRAM(s) Oral two times a day  enoxaparin Injectable 70 milliGRAM(s) SubCutaneous every 12 hours  insulin lispro (HumaLOG) corrective regimen sliding scale   SubCutaneous three times a day before meals  losartan 25 milliGRAM(s) Oral daily  montelukast 10 milliGRAM(s) Oral daily  pantoprazole    Tablet 40 milliGRAM(s) Oral before breakfast  senna 2 Tablet(s) Oral at bedtime    MEDICATIONS  (PRN):

## 2018-11-19 NOTE — PROGRESS NOTE ADULT - PROBLEM SELECTOR PLAN 2
Improved
c/w robitussin, duonebs around the clock until symptomatically improved
c/w robitussin, duonebs around the clock until symptomatically improved  c/w prednisone as patient says this helps when she has asthma exacerbation
c/w robitussin, duonebs around the clock until symptomatically improved  started prednisone as patient says this helps when she has asthma exacerbation
Improved
c/w robitussin, duonebs around the clock until symptomatically improved  c/w prednisone as patient says this helps when she has asthma exacerbation

## 2018-11-19 NOTE — PROGRESS NOTE ADULT - PROVIDER SPECIALTY LIST ADULT
Cardiology
Internal Medicine
Hospitalist
Internal Medicine
Hospitalist

## 2018-11-19 NOTE — DIETITIAN INITIAL EVALUATION ADULT. - OTHER INFO
nutrition assessment for length of stay; lives home with family, HHA help; denied GI distress, chewing or swallowing problem at present, no specific food choices reported; 75% intake at times per flow sheets, but varied depending on food served; denied recent wt changes, wt data from [facility] EMR reviewed: 153 lb 1/8/18-->158 lb 11/13/18-->163.5 lb 11/17/18

## 2018-11-19 NOTE — PROGRESS NOTE ADULT - ASSESSMENT
89 yr old Female with past PMHX of DM, HLD, and HTN presents with 2 weeks of cough and PAF.  1.D/C Tele monitoring.  2.PAF-lovenox, amiodarone load completed, start 200mg qd from 11/20/18.  3.HTN-Cozaar.  4.DM-Insulin.  5.Lipid D/O-statin.  6.PPI.  7.On amiodarone-check tft's,lft's q3mo, cxr/pft/optho 1xyr.
89 yr old Female with past PMHX of DM, HLD, and HTN presents with 2 weeks of cough and PAF.  1.Tele monitoring-R/O Tachy-Naman.  2.PAF-lovenox, amiodarone load 400mg bid D#2-pt and son now agree to proceed.  3.HTN-Cozaar.  4.DM-Insulin.  5.Lipid D/O-statin.  6.PPI.
89 yr old Female with past PMHX of DM, HLD, and HTN presents with 2 weeks of cough and PAF.  1.Tele monitoring-R/O Tachy-Naman.  2.PAF-lovenox, amiodarone load 400mg bid D#2.  3.HTN-Cozaar.  4.DM-Insulin.  5.Lipid D/O-statin.  6.PPI.
89 yr old Female with past PMHX of DM, HLD, and HTN presents with 2 weeks of cough and PAF.  1.Tele monitoring-R/O Tachy-Naman.  2.PAF-lovenox, amiodarone load 400mg bid D#3.  3.HTN-Cozaar.  4.DM-Insulin.  5.Lipid D/O-statin.  6.PPI.
89 yr old Female with past PMHX of DM, HLD, and HTN presents with 2 weeks of cough and PAF.  1.Tele monitoring-R/O Tachy-Naman.  2.PAF-lovenox, amiodarone load 400mg bid D#4.  3.HTN-Cozaar.  4.DM-Insulin.  5.Lipid D/O-statin.  6.PPI.
89 yr old Female with past PMHX of DM, HLD, and HTN presents with 2 weeks of cough and PAF.  1.Tele monitoring-R/O Tachy-Naman.  2.PAF-lovenox, d/c low dose b blocker, will try amiodarone load 400mg bid.  3.HTN-Cozaar.  4.DM-Insulin.  5.Lipid D/O-statin.  6.PPI.
89F PMH  of DM, HTN, HLD, asthma p/w cough x2weeks and diarrhea x1day. Patient admitted for new onset Afib likely 2/2 acute bronchitis, however will need to rule out ischemic/reversible causes.
Patient admitted for new onset Afib likely 2/2 acute bronchitis, however will need to rule out ischemic/reversible causes

## 2018-11-19 NOTE — PROGRESS NOTE ADULT - PROBLEM SELECTOR PLAN 6
IMPROVE VTE Individual Risk Assessment          RISK                                                          Points  [  ] Previous VTE                                                3  [ x ] Thrombophilia                                             2  [  ] Lower limb paralysis                                   2        (unable to hold up >15 seconds)    [  ] Current Cancer                                             2         (within 6 months)  [ x ] Immobilization > 24 hrs                              1  [  ] ICU/CCU stay > 24 hours                             1  [ x ] Age > 60                                                         1    IMPROVE VTE Score: 4    c/w lovenox for full anticoagulation in setting of new onset Afib with CHADSVASC score 5

## 2018-11-20 NOTE — H&P ADULT - NECK DETAILS
SUBJECTIVE:   Kamilah Dee is a 34 year old female who presents to clinic today for the following health issues:    Chief Complaint   Patient presents with     Abdominal Pain       ABDOMINAL PAIN     Onset: 1 week    Description:   Character: Cramping  Location: poppy-umbilical region  Radiation: None    Intensity: mild    Progression of Symptoms:  Same and constant    Accompanying Signs & Symptoms:  Fever/Chills?: no   Gas/Bloating: YES  Nausea: no   Vomitting: no   Diarrhea?: no   Constipation:no   Dysuria or Hematuria: no    History:   Trauma: no   Previous similar pain: no    Previous tests done: none    Precipitating factors:   Does the pain change with:     Food: no      BM: no     Urination: no     Alleviating factors:  none    Therapies Tried and outcome: none- patient did not  PPI prescribe by ED     LMP:  11/18/2018 on Depo     Seen in ED one week ago for emesis that had blood. This was not witness, but went to ER as she was worried  The specific cause of her streaks of blood in her vomit is unconfirmed.  She could have a small Isadora-Freeman tear.  She does not have identified or suspected risk factors for esophageal varices or a more dangerous cause.  Differential also includes gastritis, neoplasm, esophagitis, vascular malformation and others.  Her vital signs are satisfactory, as is her hemoglobin.  It is somewhat unusual that she has an unexplained small wound to her left lower lip, which was slowly oozing blood when I first saw her.  It is possible that blood from this was mistaken for hematemesis.  This is unconfirmed, however, and regardless I consider her appropriate for discharge home and close outpatient follow-up, having discussed and agreed upon strict return precautions for worsening at any hour.     Diagnosis:      ICD-10-CM     1. Hematemesis, presence of nausea not specified K92.0     2. Open wound of lip, unspecified open wound type, initial encounter              -------------------------------------    Problem list and histories reviewed & adjusted, as indicated.  Additional history: as documented    Labs reviewed in EPIC    Reviewed and updated as needed this visit by clinical staff       Reviewed and updated as needed this visit by Provider  Allergies  Meds         ROS:  Constitutional, HEENT, cardiovascular, pulmonary, gi and gu systems are negative, except as otherwise noted.    OBJECTIVE:     /68  Pulse 72  Temp 98.1  F (36.7  C) (Oral)  Resp 16  Wt 128 lb (58.1 kg)  SpO2 100%  BMI 20.66 kg/m2  Body mass index is 20.66 kg/(m^2).  GENERAL: healthy, alert and no distress  RESP: lungs clear to auscultation - no rales, rhonchi or wheezes  CV: regular rate and rhythm, normal S1 S2, no S3 or S4, no murmur, click or rub, no peripheral edema and peripheral pulses strong  ABDOMEN: mild tenderness epigastric and umbilical and bowel sounds normal  MS: no gross musculoskeletal defects noted, no edema  SKIN: no suspicious lesions or rashes    Diagnostic Test Results:  Results for orders placed or performed during the hospital encounter of 11/11/18   CBC with platelets differential   Result Value Ref Range    WBC 7.6 4.0 - 11.0 10e9/L    RBC Count 4.66 3.8 - 5.2 10e12/L    Hemoglobin 13.8 11.7 - 15.7 g/dL    Hematocrit 40.7 35.0 - 47.0 %    MCV 87 78 - 100 fl    MCH 29.6 26.5 - 33.0 pg    MCHC 33.9 31.5 - 36.5 g/dL    RDW 12.6 10.0 - 15.0 %    Platelet Count 283 150 - 450 10e9/L    Diff Method Automated Method     % Neutrophils 59.3 %    % Lymphocytes 31.4 %    % Monocytes 6.9 %    % Eosinophils 2.0 %    % Basophils 0.3 %    % Immature Granulocytes 0.1 %    Nucleated RBCs 0 0 /100    Absolute Neutrophil 4.5 1.6 - 8.3 10e9/L    Absolute Lymphocytes 2.4 0.8 - 5.3 10e9/L    Absolute Monocytes 0.5 0.0 - 1.3 10e9/L    Absolute Eosinophils 0.2 0.0 - 0.7 10e9/L    Absolute Basophils 0.0 0.0 - 0.2 10e9/L    Abs Immature Granulocytes 0.0 0 - 0.4 10e9/L    Absolute  Nucleated RBC 0.0    Comprehensive metabolic panel   Result Value Ref Range    Sodium 141 133 - 144 mmol/L    Potassium 3.5 3.4 - 5.3 mmol/L    Chloride 103 94 - 109 mmol/L    Carbon Dioxide 32 20 - 32 mmol/L    Anion Gap 6 3 - 14 mmol/L    Glucose 85 70 - 99 mg/dL    Urea Nitrogen 16 7 - 30 mg/dL    Creatinine 0.69 0.52 - 1.04 mg/dL    GFR Estimate >90 >60 mL/min/1.7m2    GFR Estimate If Black >90 >60 mL/min/1.7m2    Calcium 8.9 8.5 - 10.1 mg/dL    Bilirubin Total 0.4 0.2 - 1.3 mg/dL    Albumin 4.2 3.4 - 5.0 g/dL    Protein Total 8.1 6.8 - 8.8 g/dL    Alkaline Phosphatase 51 40 - 150 U/L    ALT 23 0 - 50 U/L    AST 16 0 - 45 U/L   HCG qualitative   Result Value Ref Range    HCG Qualitative Serum Negative NEG^Negative   Alcohol ethyl   Result Value Ref Range    Ethanol g/dL <0.01 <0.01 g/dL         ASSESSMENT/PLAN:             1. Abdominal pain, epigastric      2. Bloating symptom    Clear liquids.  Water, juice that you can see through. Vitamin Water. Jello  Broth, Soup -  Advance diet to low residue if doing well.       Omeprazole try.-  OTC     When to be worried,   Severe pain in one local  More episodes of vomiting with blood   Fever.      25 minutes spent with patient > 50% of time on counseling and plan of care.      Lisseth Johnson PA-C  [facility]     normal thyroid gland/supple/no JVD

## 2018-11-27 PROCEDURE — 82306 VITAMIN D 25 HYDROXY: CPT

## 2018-11-27 PROCEDURE — 93306 TTE W/DOPPLER COMPLETE: CPT

## 2018-11-27 PROCEDURE — 85027 COMPLETE CBC AUTOMATED: CPT

## 2018-11-27 PROCEDURE — 84100 ASSAY OF PHOSPHORUS: CPT

## 2018-11-27 PROCEDURE — 96372 THER/PROPH/DIAG INJ SC/IM: CPT

## 2018-11-27 PROCEDURE — 87798 DETECT AGENT NOS DNA AMP: CPT

## 2018-11-27 PROCEDURE — 94640 AIRWAY INHALATION TREATMENT: CPT

## 2018-11-27 PROCEDURE — 84484 ASSAY OF TROPONIN QUANT: CPT

## 2018-11-27 PROCEDURE — 83735 ASSAY OF MAGNESIUM: CPT

## 2018-11-27 PROCEDURE — 87633 RESP VIRUS 12-25 TARGETS: CPT

## 2018-11-27 PROCEDURE — 93005 ELECTROCARDIOGRAM TRACING: CPT

## 2018-11-27 PROCEDURE — 87486 CHLMYD PNEUM DNA AMP PROBE: CPT

## 2018-11-27 PROCEDURE — 80048 BASIC METABOLIC PNL TOTAL CA: CPT

## 2018-11-27 PROCEDURE — 71046 X-RAY EXAM CHEST 2 VIEWS: CPT

## 2018-11-27 PROCEDURE — 82550 ASSAY OF CK (CPK): CPT

## 2018-11-27 PROCEDURE — 84443 ASSAY THYROID STIM HORMONE: CPT

## 2018-11-27 PROCEDURE — 99285 EMERGENCY DEPT VISIT HI MDM: CPT | Mod: 25

## 2018-11-27 PROCEDURE — 82962 GLUCOSE BLOOD TEST: CPT

## 2018-11-27 PROCEDURE — 82607 VITAMIN B-12: CPT

## 2018-11-27 PROCEDURE — 83880 ASSAY OF NATRIURETIC PEPTIDE: CPT

## 2018-11-27 PROCEDURE — 82553 CREATINE MB FRACTION: CPT

## 2018-11-27 PROCEDURE — 83036 HEMOGLOBIN GLYCOSYLATED A1C: CPT

## 2018-11-27 PROCEDURE — 80053 COMPREHEN METABOLIC PANEL: CPT

## 2018-11-27 PROCEDURE — 80061 LIPID PANEL: CPT

## 2018-11-27 PROCEDURE — 87581 M.PNEUMON DNA AMP PROBE: CPT

## 2019-09-10 ENCOUNTER — EMERGENCY (EMERGENCY)
Facility: HOSPITAL | Age: 84
LOS: 1 days | Discharge: HOME | End: 2019-09-10
Attending: EMERGENCY MEDICINE | Admitting: EMERGENCY MEDICINE
Payer: MEDICARE

## 2019-09-10 VITALS
TEMPERATURE: 99 F | HEART RATE: 96 BPM | SYSTOLIC BLOOD PRESSURE: 153 MMHG | RESPIRATION RATE: 20 BRPM | DIASTOLIC BLOOD PRESSURE: 72 MMHG | OXYGEN SATURATION: 98 %

## 2019-09-10 VITALS
TEMPERATURE: 98 F | HEART RATE: 90 BPM | OXYGEN SATURATION: 97 % | DIASTOLIC BLOOD PRESSURE: 58 MMHG | RESPIRATION RATE: 20 BRPM | SYSTOLIC BLOOD PRESSURE: 115 MMHG

## 2019-09-10 LAB
APPEARANCE UR: ABNORMAL
APTT BLD: 37.2 SEC — HIGH (ref 28.5–37)
BACTERIA # UR AUTO: ABNORMAL
BASE EXCESS BLDA CALC-SCNC: 4.7 MMOL/L — HIGH (ref -2–2)
BASOPHILS # BLD AUTO: 0.04 K/UL — SIGNIFICANT CHANGE UP (ref 0–0.2)
BASOPHILS NFR BLD AUTO: 0.5 % — SIGNIFICANT CHANGE UP (ref 0–2)
BILIRUB UR-MCNC: NEGATIVE — SIGNIFICANT CHANGE UP
BLOOD GAS COMMENTS ARTERIAL: SIGNIFICANT CHANGE UP
BLOOD GAS COMMENTS ARTERIAL: SIGNIFICANT CHANGE UP
COLOR SPEC: YELLOW — SIGNIFICANT CHANGE UP
COMMENT - URINE: SIGNIFICANT CHANGE UP
DIFF PNL FLD: ABNORMAL
EOSINOPHIL # BLD AUTO: 0.56 K/UL — HIGH (ref 0–0.5)
EOSINOPHIL NFR BLD AUTO: 6.3 % — HIGH (ref 0–6)
EPI CELLS # UR: SIGNIFICANT CHANGE UP
FLU A RESULT: SIGNIFICANT CHANGE UP
FLU A RESULT: SIGNIFICANT CHANGE UP
FLUAV AG NPH QL: SIGNIFICANT CHANGE UP
FLUBV AG NPH QL: SIGNIFICANT CHANGE UP
GLUCOSE UR QL: NEGATIVE — SIGNIFICANT CHANGE UP
HCO3 BLDA-SCNC: 28 MMOL/L — HIGH (ref 23–27)
HCT VFR BLD CALC: 28.4 % — LOW (ref 34.5–45)
HGB BLD-MCNC: 8.9 G/DL — LOW (ref 11.5–15.5)
IMM GRANULOCYTES NFR BLD AUTO: 0.5 % — SIGNIFICANT CHANGE UP (ref 0–1.5)
INR BLD: 1.05 RATIO — SIGNIFICANT CHANGE UP (ref 0.88–1.16)
KETONES UR-MCNC: NEGATIVE — SIGNIFICANT CHANGE UP
LACTATE SERPL-SCNC: 0.9 MMOL/L — SIGNIFICANT CHANGE UP (ref 0.7–2)
LEUKOCYTE ESTERASE UR-ACNC: ABNORMAL
LYMPHOCYTES # BLD AUTO: 2.3 K/UL — SIGNIFICANT CHANGE UP (ref 1–3.3)
LYMPHOCYTES # BLD AUTO: 26 % — SIGNIFICANT CHANGE UP (ref 13–44)
MCHC RBC-ENTMCNC: 26.3 PG — LOW (ref 27–34)
MCHC RBC-ENTMCNC: 31.3 GM/DL — LOW (ref 32–36)
MCV RBC AUTO: 83.8 FL — SIGNIFICANT CHANGE UP (ref 80–100)
MONOCYTES # BLD AUTO: 0.9 K/UL — SIGNIFICANT CHANGE UP (ref 0–0.9)
MONOCYTES NFR BLD AUTO: 10.2 % — SIGNIFICANT CHANGE UP (ref 2–14)
NEUTROPHILS # BLD AUTO: 5.01 K/UL — SIGNIFICANT CHANGE UP (ref 1.8–7.4)
NEUTROPHILS NFR BLD AUTO: 56.5 % — SIGNIFICANT CHANGE UP (ref 43–77)
NITRITE UR-MCNC: POSITIVE
NRBC # BLD: 0 /100 WBCS — SIGNIFICANT CHANGE UP (ref 0–0)
NT-PROBNP SERPL-SCNC: 118 PG/ML — SIGNIFICANT CHANGE UP (ref 0–450)
PCO2 BLDA: 42 MMHG — SIGNIFICANT CHANGE UP (ref 32–46)
PH BLDA: 7.45 — SIGNIFICANT CHANGE UP (ref 7.35–7.45)
PH UR: 8 — SIGNIFICANT CHANGE UP (ref 5–8)
PLATELET # BLD AUTO: 335 K/UL — SIGNIFICANT CHANGE UP (ref 150–400)
PO2 BLDA: 76 MMHG — SIGNIFICANT CHANGE UP (ref 74–108)
PROT UR-MCNC: 25 MG/DL
PROTHROM AB SERPL-ACNC: 12 SEC — SIGNIFICANT CHANGE UP (ref 10–12.9)
RBC # BLD: 3.39 M/UL — LOW (ref 3.8–5.2)
RBC # FLD: 14.6 % — HIGH (ref 10.3–14.5)
RBC CASTS # UR COMP ASSIST: SIGNIFICANT CHANGE UP /HPF (ref 0–4)
RSV RESULT: SIGNIFICANT CHANGE UP
RSV RNA RESP QL NAA+PROBE: SIGNIFICANT CHANGE UP
SAO2 % BLDA: 95 % — SIGNIFICANT CHANGE UP (ref 92–96)
SP GR SPEC: 1.01 — SIGNIFICANT CHANGE UP (ref 1.01–1.02)
TROPONIN I SERPL-MCNC: 0.02 NG/ML — SIGNIFICANT CHANGE UP (ref 0.01–0.04)
TSH SERPL-MCNC: 4.52 UIU/ML — HIGH (ref 0.36–3.74)
UROBILINOGEN FLD QL: NEGATIVE — SIGNIFICANT CHANGE UP
WBC # BLD: 8.85 K/UL — SIGNIFICANT CHANGE UP (ref 3.8–10.5)
WBC # FLD AUTO: 8.85 K/UL — SIGNIFICANT CHANGE UP (ref 3.8–10.5)
WBC UR QL: ABNORMAL

## 2019-09-10 PROCEDURE — 85610 PROTHROMBIN TIME: CPT

## 2019-09-10 PROCEDURE — 83880 ASSAY OF NATRIURETIC PEPTIDE: CPT

## 2019-09-10 PROCEDURE — 82553 CREATINE MB FRACTION: CPT

## 2019-09-10 PROCEDURE — 70450 CT HEAD/BRAIN W/O DYE: CPT | Mod: 26

## 2019-09-10 PROCEDURE — 83735 ASSAY OF MAGNESIUM: CPT

## 2019-09-10 PROCEDURE — 99284 EMERGENCY DEPT VISIT MOD MDM: CPT | Mod: 25

## 2019-09-10 PROCEDURE — 36415 COLL VENOUS BLD VENIPUNCTURE: CPT

## 2019-09-10 PROCEDURE — 94799 UNLISTED PULMONARY SVC/PX: CPT

## 2019-09-10 PROCEDURE — 96375 TX/PRO/DX INJ NEW DRUG ADDON: CPT

## 2019-09-10 PROCEDURE — 71045 X-RAY EXAM CHEST 1 VIEW: CPT

## 2019-09-10 PROCEDURE — 70450 CT HEAD/BRAIN W/O DYE: CPT

## 2019-09-10 PROCEDURE — 85027 COMPLETE CBC AUTOMATED: CPT

## 2019-09-10 PROCEDURE — 81001 URINALYSIS AUTO W/SCOPE: CPT

## 2019-09-10 PROCEDURE — 71250 CT THORAX DX C-: CPT | Mod: 26

## 2019-09-10 PROCEDURE — 36600 WITHDRAWAL OF ARTERIAL BLOOD: CPT

## 2019-09-10 PROCEDURE — 87186 SC STD MICRODIL/AGAR DIL: CPT

## 2019-09-10 PROCEDURE — 87086 URINE CULTURE/COLONY COUNT: CPT

## 2019-09-10 PROCEDURE — 87631 RESP VIRUS 3-5 TARGETS: CPT

## 2019-09-10 PROCEDURE — 94640 AIRWAY INHALATION TREATMENT: CPT

## 2019-09-10 PROCEDURE — 71045 X-RAY EXAM CHEST 1 VIEW: CPT | Mod: 26

## 2019-09-10 PROCEDURE — 96374 THER/PROPH/DIAG INJ IV PUSH: CPT

## 2019-09-10 PROCEDURE — 84443 ASSAY THYROID STIM HORMONE: CPT

## 2019-09-10 PROCEDURE — 71250 CT THORAX DX C-: CPT

## 2019-09-10 PROCEDURE — 85730 THROMBOPLASTIN TIME PARTIAL: CPT

## 2019-09-10 PROCEDURE — 82803 BLOOD GASES ANY COMBINATION: CPT

## 2019-09-10 PROCEDURE — 80053 COMPREHEN METABOLIC PANEL: CPT

## 2019-09-10 PROCEDURE — 83605 ASSAY OF LACTIC ACID: CPT

## 2019-09-10 PROCEDURE — 84484 ASSAY OF TROPONIN QUANT: CPT

## 2019-09-10 PROCEDURE — 87040 BLOOD CULTURE FOR BACTERIA: CPT

## 2019-09-10 PROCEDURE — 99284 EMERGENCY DEPT VISIT MOD MDM: CPT

## 2019-09-10 RX ORDER — FUROSEMIDE 40 MG
40 TABLET ORAL ONCE
Refills: 0 | Status: COMPLETED | OUTPATIENT
Start: 2019-09-10 | End: 2019-09-10

## 2019-09-10 RX ORDER — IPRATROPIUM/ALBUTEROL SULFATE 18-103MCG
3 AEROSOL WITH ADAPTER (GRAM) INHALATION ONCE
Refills: 0 | Status: COMPLETED | OUTPATIENT
Start: 2019-09-10 | End: 2019-09-10

## 2019-09-10 RX ORDER — PIPERACILLIN AND TAZOBACTAM 4; .5 G/20ML; G/20ML
3.38 INJECTION, POWDER, LYOPHILIZED, FOR SOLUTION INTRAVENOUS ONCE
Refills: 0 | Status: COMPLETED | OUTPATIENT
Start: 2019-09-10 | End: 2019-09-10

## 2019-09-10 RX ADMIN — Medication 40 MILLIGRAM(S): at 17:20

## 2019-09-10 RX ADMIN — Medication 3 MILLILITER(S): at 17:27

## 2019-09-10 RX ADMIN — PIPERACILLIN AND TAZOBACTAM 200 GRAM(S): 4; .5 INJECTION, POWDER, LYOPHILIZED, FOR SOLUTION INTRAVENOUS at 17:20

## 2019-09-10 RX ADMIN — Medication 125 MILLIGRAM(S): at 17:20

## 2019-09-10 NOTE — ED PROVIDER NOTE - PROGRESS NOTE DETAILS
paged Dr. Tien Vieyra, spoke with Dr. Arias, case discussed, no acute findings, [facility]

## 2019-09-10 NOTE — ED PROVIDER NOTE - PATIENT PORTAL LINK FT
You can access the FollowMyHealth Patient Portal offered by [facility] by registering at the following website: [email or web address]. By joining [employer]’s FollowMyHealth portal, you will also be able to view your health information using other applications (apps) compatible with our system.

## 2019-09-10 NOTE — ED ADULT NURSE NOTE - NSIMPLEMENTINTERV_GEN_ALL_ED
Implemented All Universal Safety Interventions:  [place] to call system. Call bell, personal items and telephone within reach. Instruct patient to call for assistance. Room bathroom lighting operational. Non-slip footwear when patient is off stretcher. Physically safe environment: no spills, clutter or unnecessary equipment. Stretcher in lowest position, wheels locked, appropriate side rails in place.

## 2019-09-10 NOTE — ED ADULT NURSE NOTE - PMH
Cerebrovascular accident    Diabetes mellitus    GERD (gastroesophageal reflux disease)    Hyperlipidemia    Hypertension

## 2019-09-10 NOTE — ED PROVIDER NOTE - OBJECTIVE STATEMENT
89 female sent to ER by ambulance from [facility] for evaluation of cough, difficulty breathing and lethargy.

## 2019-09-10 NOTE — ED ADULT NURSE NOTE - OBJECTIVE STATEMENT
Pt BIB EMs c/c of swelling and bulging of craniotomy site to L skull, lethargy, cough and possible aspiration during barium swallow 1 week ago.

## 2019-09-10 NOTE — ED ADULT NURSE REASSESSMENT NOTE - NS ED NURSE REASSESS COMMENT FT1
Assumed care for pt awake, nonverbal, noted trache collar suctioned with relief. VSS, no acute distress or respiratory distress noted at this time. Noted PEG tue, site is clean and dry. Pt is incontinent, incontinence care provided. waiting dispo and consult. will continue to monitor.

## 2019-09-11 RX ORDER — METHYLPHENIDATE HCL 5 MG
0 TABLET ORAL
Qty: 0 | Refills: 0 | DISCHARGE

## 2019-09-11 RX ORDER — AMLODIPINE BESYLATE 2.5 MG/1
1 TABLET ORAL
Qty: 0 | Refills: 0 | DISCHARGE

## 2019-09-11 RX ORDER — NIFEDIPINE 30 MG
0 TABLET, EXTENDED RELEASE 24 HR ORAL
Qty: 0 | Refills: 0 | DISCHARGE

## 2019-09-11 RX ORDER — METFORMIN HYDROCHLORIDE 850 MG/1
1 TABLET ORAL
Qty: 0 | Refills: 0 | DISCHARGE

## 2019-09-11 RX ORDER — HYDRALAZINE HCL 50 MG
1 TABLET ORAL
Qty: 0 | Refills: 0 | DISCHARGE

## 2019-09-11 RX ORDER — OMEPRAZOLE 10 MG/1
1 CAPSULE, DELAYED RELEASE ORAL
Qty: 0 | Refills: 0 | DISCHARGE

## 2019-09-11 RX ORDER — ROBINUL 0.2 MG/ML
1 INJECTION INTRAMUSCULAR; INTRAVENOUS
Qty: 0 | Refills: 0 | DISCHARGE

## 2019-09-11 RX ORDER — LOSARTAN POTASSIUM 100 MG/1
1 TABLET, FILM COATED ORAL
Qty: 0 | Refills: 0 | DISCHARGE

## 2019-09-11 RX ORDER — FUROSEMIDE 40 MG
1 TABLET ORAL
Qty: 0 | Refills: 0 | DISCHARGE

## 2019-09-12 PROBLEM — J45.901 UNSPECIFIED ASTHMA WITH (ACUTE) EXACERBATION: Chronic | Status: ACTIVE | Noted: 2018-11-12

## 2019-09-12 LAB
-  AMIKACIN: SIGNIFICANT CHANGE UP
-  AMPICILLIN/SULBACTAM: SIGNIFICANT CHANGE UP
-  AMPICILLIN: SIGNIFICANT CHANGE UP
-  AZTREONAM: SIGNIFICANT CHANGE UP
-  CEFAZOLIN: SIGNIFICANT CHANGE UP
-  CEFEPIME: SIGNIFICANT CHANGE UP
-  CEFOXITIN: SIGNIFICANT CHANGE UP
-  CEFTRIAXONE: SIGNIFICANT CHANGE UP
-  CIPROFLOXACIN: SIGNIFICANT CHANGE UP
-  ERTAPENEM: SIGNIFICANT CHANGE UP
-  IMIPENEM: SIGNIFICANT CHANGE UP
-  LEVOFLOXACIN: SIGNIFICANT CHANGE UP
-  MEROPENEM: SIGNIFICANT CHANGE UP
-  NITROFURANTOIN: SIGNIFICANT CHANGE UP
-  PIPERACILLIN/TAZOBACTAM: SIGNIFICANT CHANGE UP
-  TIGECYCLINE: SIGNIFICANT CHANGE UP
-  TRIMETHOPRIM/SULFAMETHOXAZOLE: SIGNIFICANT CHANGE UP
CULTURE RESULTS: SIGNIFICANT CHANGE UP
METHOD TYPE: SIGNIFICANT CHANGE UP
ORGANISM # SPEC MICROSCOPIC CNT: SIGNIFICANT CHANGE UP
ORGANISM # SPEC MICROSCOPIC CNT: SIGNIFICANT CHANGE UP
SPECIMEN SOURCE: SIGNIFICANT CHANGE UP

## 2019-09-15 LAB
CULTURE RESULTS: SIGNIFICANT CHANGE UP
CULTURE RESULTS: SIGNIFICANT CHANGE UP
SPECIMEN SOURCE: SIGNIFICANT CHANGE UP
SPECIMEN SOURCE: SIGNIFICANT CHANGE UP

## 2019-10-02 NOTE — ED PROVIDER NOTE - CONDITION AT DISCHARGE:
Take over the counter pain medication/tylenol or ibuprofen, dosage as per instructions on bottle
Improved

## 2020-11-24 NOTE — H&P ADULT - MOUTH
[address] labs recently normal;  Second BP normal. She will see MFM today and if OK then RTO 11/27 for BP check; LOTUS precautions moist

## 2021-01-01 NOTE — ED ADULT NURSE NOTE - GENITOURINARY ASSESSMENT
Vital signs stable overnight on BCPAP +5. FiO2: 21%. Alternating prongs and mask - skin intact. Tolerating Q3H feeds. Voiding/ stooling. Continue with plan of care.    - - -

## 2022-02-09 NOTE — ED ADULT NURSE NOTE - OBJECTIVE STATEMENT
eye exam updated  
Pt came in for chest pain when coughing for about a month. States having asthma.

## 2022-03-11 PROBLEM — I10 ESSENTIAL (PRIMARY) HYPERTENSION: Chronic | Status: ACTIVE | Noted: 2019-09-10

## 2022-03-11 PROBLEM — E78.5 HYPERLIPIDEMIA, UNSPECIFIED: Chronic | Status: ACTIVE | Noted: 2019-09-10

## 2022-03-11 PROBLEM — E11.9 TYPE 2 DIABETES MELLITUS WITHOUT COMPLICATIONS: Chronic | Status: ACTIVE | Noted: 2019-09-10

## 2022-03-11 PROBLEM — K21.9 GASTRO-ESOPHAGEAL REFLUX DISEASE WITHOUT ESOPHAGITIS: Chronic | Status: ACTIVE | Noted: 2019-09-10

## 2022-03-11 PROBLEM — I63.9 CEREBRAL INFARCTION, UNSPECIFIED: Chronic | Status: ACTIVE | Noted: 2019-09-10

## 2022-03-16 PROBLEM — Z00.00 ENCOUNTER FOR PREVENTIVE HEALTH EXAMINATION: Status: ACTIVE | Noted: 2022-03-16

## 2022-03-17 ENCOUNTER — APPOINTMENT (OUTPATIENT)
Dept: OTOLARYNGOLOGY | Facility: CLINIC | Age: 87
End: 2022-03-17
Payer: MEDICARE

## 2022-03-17 VITALS
DIASTOLIC BLOOD PRESSURE: 64 MMHG | WEIGHT: 154 LBS | HEART RATE: 100 BPM | BODY MASS INDEX: 26.29 KG/M2 | SYSTOLIC BLOOD PRESSURE: 125 MMHG | HEIGHT: 64 IN

## 2022-03-17 DIAGNOSIS — Z78.9 OTHER SPECIFIED HEALTH STATUS: ICD-10-CM

## 2022-03-17 DIAGNOSIS — Z86.79 PERSONAL HISTORY OF OTHER DISEASES OF THE CIRCULATORY SYSTEM: ICD-10-CM

## 2022-03-17 DIAGNOSIS — Z86.39 PERSONAL HISTORY OF OTHER ENDOCRINE, NUTRITIONAL AND METABOLIC DISEASE: ICD-10-CM

## 2022-03-17 PROCEDURE — 31575 DIAGNOSTIC LARYNGOSCOPY: CPT

## 2022-03-17 PROCEDURE — 99203 OFFICE O/P NEW LOW 30 MIN: CPT

## 2022-03-17 RX ORDER — CLOTRIMAZOLE AND BETAMETHASONE DIPROPIONATE 10; .5 MG/G; MG/G
1-0.05 CREAM TOPICAL
Refills: 0 | Status: ACTIVE | COMMUNITY

## 2022-03-17 RX ORDER — LEVOTHYROXINE SODIUM 0.17 MG/1
TABLET ORAL
Refills: 0 | Status: ACTIVE | COMMUNITY

## 2022-03-17 RX ORDER — VENLAFAXINE HCL 50 MG
TABLET ORAL
Refills: 0 | Status: ACTIVE | COMMUNITY

## 2022-03-17 RX ORDER — ATORVASTATIN CALCIUM 80 MG/1
TABLET, FILM COATED ORAL
Refills: 0 | Status: ACTIVE | COMMUNITY

## 2022-03-17 RX ORDER — AMMONIUM LACTATE 12 %
CREAM (GRAM) TOPICAL
Refills: 0 | Status: ACTIVE | COMMUNITY

## 2022-03-17 RX ORDER — ALBUTEROL 90 MCG
AEROSOL (GRAM) INHALATION
Refills: 0 | Status: ACTIVE | COMMUNITY

## 2022-03-17 RX ORDER — ALBUTEROL SULFATE 2.5 MG/.5ML
SOLUTION RESPIRATORY (INHALATION)
Refills: 0 | Status: ACTIVE | COMMUNITY

## 2022-03-17 RX ORDER — PREDNISONE 50 MG/1
TABLET ORAL
Refills: 0 | Status: ACTIVE | COMMUNITY

## 2022-03-17 RX ORDER — METFORMIN HYDROCHLORIDE 625 MG/1
TABLET ORAL
Refills: 0 | Status: ACTIVE | COMMUNITY

## 2022-03-17 NOTE — PHYSICAL EXAM
[Nasal Endoscopy Performed] : nasal endoscopy was performed, see procedure section for findings [Midline] : trachea located in midline position [Normal] : no rashes [de-identified] : galen wallis 6 disposable inner cannula cuffless with moisture chamber

## 2022-03-17 NOTE — ASSESSMENT
[FreeTextEntry1] : 89 year old female with tracheostomy placed in hospital after she was admitted for a fall, intubated, and had respiratory failure. Now she is at home with tracheostomy, and family would like to consider decannulation. They do not have home supplies. We will proceed with obtaining home supplies for a shiley 4 disposable inner cannula and trach. They will return for trach change at that time. I do not think a trach change before then is necessary.

## 2022-03-17 NOTE — HISTORY OF PRESENT ILLNESS
[de-identified] : 89 year old female presents for initial evaluation for tracheostomy change\par Reports 06/2019 she obtained the trach after falling at home. Reports that last trach change was three months ago while in the nursing home. Reports has since then been discharged and lives at home with her daughter. \par Reports they do not know what size the trach--cuffless. Denies bleeding or swelling around trach site.\par Reports she on a regular diet without difficulty. Reports she has not obtained a speaking valve yet and would like one.\par Denies dysphagia, odynophagia, dyspnea or otalgia.\par They have not had any issues with trach. No known upper or lower airway obstruction. Had a cap for a week but then was told not ready for decannulation. has not been downsized. \par Denies recent fevers/infections.

## 2022-03-17 NOTE — REASON FOR VISIT
[Initial Evaluation] : an initial evaluation for [Other: _____] : [unfilled] [FreeTextEntry2] : for tracheostomy change.

## 2022-03-17 NOTE — PROCEDURE
[FreeTextEntry1] : Laryngoscopy and tracheoscopy [FreeTextEntry2] : tracheostomy [FreeTextEntry3] : Procedure: Flexible fiberoptic laryngoscopy\par \par Pre-operative diagnosis: \par \par Indication: unable to tolerate mirror exam\par \par Details:\par After decongestant and lidocaine was sprayed in the bilateral nasal cavities, a flexible laryngoscope was inserted into the right nares. The nasal cavity, middle meatus, nasopharynx, and glottis were visualized. The endoscope was then inserted into the left nares and the nasal cavity was visualized. The patient tolerated procedure well.\par \par Results:\par Right nasal cavity: clear without masses or lesions, clear secretions\par Right inferior turbinate: normal\par Right middle turbinate: normal\par Right middle meatus: normal without masses, pus\par Right ETO: normal\par Left nasal cavity: clear without masses or lesions, clear secretions\par Left inferior turbinate: normal\par Left middle turbinate: normal\par Left middle meatus: normal without masses, pus\par Left ETO: normal\par Nasopharynx: normal without masses or lesions\par Base of tongue: clear\par Vallecula: Clear\par Secretions: normal\par Glottis: Vocal cords mobile and symmetric, piriform sinus clear, normal AE folds, arytenoids\par Normal appearing subglottis.\par No obstruction\par \par Tracheoscopy - normal patent airway. trach in place.

## 2022-04-08 NOTE — ED ADULT NURSE NOTE - ED STAT RN HANDOFF TIME
Patient called to provide BP update for Tanvi Clark MD     BP was 151/63 on Wednesday at her appointment with Dr Ortez.   She was very upset with everything she was being told. Thought she may have to have lung surgery, possibly get a chest tube etc.     Now less worried. Was told she only needs a follow up CT chest in 6 mo.    BP this am at 8:50 was 112/60  BP now is 115/54   Told BP in goal. To keep follow up appointment on 4/13. Patient verbalized understanding.    
18:52

## 2022-04-27 ENCOUNTER — APPOINTMENT (OUTPATIENT)
Dept: OTOLARYNGOLOGY | Facility: CLINIC | Age: 87
End: 2022-04-27
Payer: MEDICARE

## 2022-04-27 PROCEDURE — 99212 OFFICE O/P EST SF 10 MIN: CPT

## 2022-04-27 NOTE — HISTORY OF PRESENT ILLNESS
[de-identified] : 89 year old female presents for tracheostomy change.\par Denies bleeding, bruising, swelling around the trach. Denies foul smelling secretions. Current trach size is 6DCFN. Plan to downsize today. \par

## 2022-04-27 NOTE — CONSULT LETTER
[Consult Letter:] : I had the pleasure of evaluating your patient, [unfilled]. [Please see my note below.] : Please see my note below. [Consult Closing:] : Thank you very much for allowing me to participate in the care of this patient.  If you have any questions, please do not hesitate to contact me. [Sincerely,] : Sincerely, [FreeTextEntry3] : Marylou Peter MD\par Facial Plastic & Reconstructive Surgery\par Department of Otolaryngology\par [address]\par [place], [place]\par [phone]\par \par [address]\[place] [address]\par [phone]

## 2022-04-27 NOTE — PROCEDURE
[FreeTextEntry1] : Trach change [FreeTextEntry2] : Downsize [FreeTextEntry3] : The Shiley 6 DCFN was removed and the Shiley 4 DCFN was placed. Confirmed position with scope. Suctioned. Trach tie placed. HME chamber placed. Son given instructions for cap use at home.

## 2022-04-27 NOTE — PHYSICAL EXAM
[de-identified] : galen wallis 6 disposable inner cannula cuffless with moisture chamber [Nasal Endoscopy Performed] : nasal endoscopy was performed, see procedure section for findings [Midline] : trachea located in midline position [Normal] : no rashes

## 2022-04-27 NOTE — ASSESSMENT
[FreeTextEntry1] : 89 year old female with tracheostomy after respiratory failure while in hospital for fall. Trach downsized today. Will follow up with son in 2 weeks, if doing well with downsize, can start capping trial.

## 2022-05-08 ENCOUNTER — EMERGENCY (EMERGENCY)
Facility: HOSPITAL | Age: 87
LOS: 1 days | Discharge: HOME | End: 2022-05-08
Attending: STUDENT IN AN ORGANIZED HEALTH CARE EDUCATION/TRAINING PROGRAM | Admitting: STUDENT IN AN ORGANIZED HEALTH CARE EDUCATION/TRAINING PROGRAM
Payer: MEDICARE

## 2022-05-08 VITALS
OXYGEN SATURATION: 98 % | SYSTOLIC BLOOD PRESSURE: 160 MMHG | HEIGHT: 62 IN | RESPIRATION RATE: 24 BRPM | DIASTOLIC BLOOD PRESSURE: 68 MMHG | HEART RATE: 95 BPM | TEMPERATURE: 98 F

## 2022-05-08 VITALS
DIASTOLIC BLOOD PRESSURE: 67 MMHG | HEART RATE: 75 BPM | OXYGEN SATURATION: 98 % | SYSTOLIC BLOOD PRESSURE: 113 MMHG | RESPIRATION RATE: 16 BRPM

## 2022-05-08 LAB
ALBUMIN SERPL ELPH-MCNC: 3.9 G/DL — SIGNIFICANT CHANGE UP (ref 3.3–5)
ALP SERPL-CCNC: 108 U/L — SIGNIFICANT CHANGE UP (ref 40–120)
ALT FLD-CCNC: 16 U/L — SIGNIFICANT CHANGE UP (ref 4–33)
ANION GAP SERPL CALC-SCNC: 20 MMOL/L — HIGH (ref 7–14)
AST SERPL-CCNC: 21 U/L — SIGNIFICANT CHANGE UP (ref 4–32)
BASE EXCESS BLDV CALC-SCNC: 0 MMOL/L — SIGNIFICANT CHANGE UP (ref -2–3)
BASOPHILS # BLD AUTO: 0.03 K/UL — SIGNIFICANT CHANGE UP (ref 0–0.2)
BASOPHILS NFR BLD AUTO: 0.3 % — SIGNIFICANT CHANGE UP (ref 0–2)
BILIRUB SERPL-MCNC: 0.2 MG/DL — SIGNIFICANT CHANGE UP (ref 0.2–1.2)
BLOOD GAS VENOUS COMPREHENSIVE RESULT: SIGNIFICANT CHANGE UP
BLOOD GAS VENOUS COMPREHENSIVE RESULT: SIGNIFICANT CHANGE UP
BUN SERPL-MCNC: 25 MG/DL — HIGH (ref 7–23)
CALCIUM SERPL-MCNC: 9.4 MG/DL — SIGNIFICANT CHANGE UP (ref 8.4–10.5)
CHLORIDE BLDV-SCNC: 103 MMOL/L — SIGNIFICANT CHANGE UP (ref 96–108)
CHLORIDE SERPL-SCNC: 99 MMOL/L — SIGNIFICANT CHANGE UP (ref 98–107)
CO2 BLDV-SCNC: 27.9 MMOL/L — HIGH (ref 22–26)
CO2 SERPL-SCNC: 22 MMOL/L — SIGNIFICANT CHANGE UP (ref 22–31)
CREAT SERPL-MCNC: 0.89 MG/DL — SIGNIFICANT CHANGE UP (ref 0.5–1.3)
EGFR: 60 ML/MIN/1.73M2 — SIGNIFICANT CHANGE UP
EOSINOPHIL # BLD AUTO: 0.06 K/UL — SIGNIFICANT CHANGE UP (ref 0–0.5)
EOSINOPHIL NFR BLD AUTO: 0.6 % — SIGNIFICANT CHANGE UP (ref 0–6)
GAS PNL BLDV: 139 MMOL/L — SIGNIFICANT CHANGE UP (ref 136–145)
GLUCOSE BLDV-MCNC: 196 MG/DL — HIGH (ref 70–99)
GLUCOSE SERPL-MCNC: 192 MG/DL — HIGH (ref 70–99)
HCO3 BLDV-SCNC: 26 MMOL/L — SIGNIFICANT CHANGE UP (ref 22–29)
HCT VFR BLD CALC: 43.8 % — SIGNIFICANT CHANGE UP (ref 34.5–45)
HCT VFR BLDA CALC: 40 % — SIGNIFICANT CHANGE UP (ref 34.5–46.5)
HGB BLD CALC-MCNC: 13.3 G/DL — SIGNIFICANT CHANGE UP (ref 11.5–15.5)
HGB BLD-MCNC: 13.6 G/DL — SIGNIFICANT CHANGE UP (ref 11.5–15.5)
IANC: 7.85 K/UL — HIGH (ref 1.8–7.4)
IMM GRANULOCYTES NFR BLD AUTO: 0.4 % — SIGNIFICANT CHANGE UP (ref 0–1.5)
LACTATE BLDV-MCNC: 5.5 MMOL/L — CRITICAL HIGH (ref 0.5–2)
LYMPHOCYTES # BLD AUTO: 1.17 K/UL — SIGNIFICANT CHANGE UP (ref 1–3.3)
LYMPHOCYTES # BLD AUTO: 12.2 % — LOW (ref 13–44)
MCHC RBC-ENTMCNC: 27 PG — SIGNIFICANT CHANGE UP (ref 27–34)
MCHC RBC-ENTMCNC: 31.1 GM/DL — LOW (ref 32–36)
MCV RBC AUTO: 86.9 FL — SIGNIFICANT CHANGE UP (ref 80–100)
MONOCYTES # BLD AUTO: 0.45 K/UL — SIGNIFICANT CHANGE UP (ref 0–0.9)
MONOCYTES NFR BLD AUTO: 4.7 % — SIGNIFICANT CHANGE UP (ref 2–14)
NEUTROPHILS # BLD AUTO: 7.85 K/UL — HIGH (ref 1.8–7.4)
NEUTROPHILS NFR BLD AUTO: 81.8 % — HIGH (ref 43–77)
NRBC # BLD: 0 /100 WBCS — SIGNIFICANT CHANGE UP
NRBC # FLD: 0 K/UL — SIGNIFICANT CHANGE UP
PCO2 BLDV: 49 MMHG — HIGH (ref 39–42)
PH BLDV: 7.34 — SIGNIFICANT CHANGE UP (ref 7.32–7.43)
PLATELET # BLD AUTO: 179 K/UL — SIGNIFICANT CHANGE UP (ref 150–400)
PO2 BLDV: 31 MMHG — SIGNIFICANT CHANGE UP
POTASSIUM BLDV-SCNC: 4 MMOL/L — SIGNIFICANT CHANGE UP (ref 3.5–5.1)
POTASSIUM SERPL-MCNC: 3.9 MMOL/L — SIGNIFICANT CHANGE UP (ref 3.5–5.3)
POTASSIUM SERPL-SCNC: 3.9 MMOL/L — SIGNIFICANT CHANGE UP (ref 3.5–5.3)
PROT SERPL-MCNC: 7.3 G/DL — SIGNIFICANT CHANGE UP (ref 6–8.3)
RBC # BLD: 5.04 M/UL — SIGNIFICANT CHANGE UP (ref 3.8–5.2)
RBC # FLD: 14.6 % — HIGH (ref 10.3–14.5)
SAO2 % BLDV: 45.1 % — SIGNIFICANT CHANGE UP
SARS-COV-2 RNA SPEC QL NAA+PROBE: SIGNIFICANT CHANGE UP
SODIUM SERPL-SCNC: 141 MMOL/L — SIGNIFICANT CHANGE UP (ref 135–145)
WBC # BLD: 9.6 K/UL — SIGNIFICANT CHANGE UP (ref 3.8–10.5)
WBC # FLD AUTO: 9.6 K/UL — SIGNIFICANT CHANGE UP (ref 3.8–10.5)

## 2022-05-08 PROCEDURE — 99284 EMERGENCY DEPT VISIT MOD MDM: CPT

## 2022-05-08 PROCEDURE — 71045 X-RAY EXAM CHEST 1 VIEW: CPT | Mod: 26

## 2022-05-08 RX ORDER — SODIUM CHLORIDE 9 MG/ML
1000 INJECTION INTRAMUSCULAR; INTRAVENOUS; SUBCUTANEOUS ONCE
Refills: 0 | Status: COMPLETED | OUTPATIENT
Start: 2022-05-08 | End: 2022-05-08

## 2022-05-08 RX ADMIN — SODIUM CHLORIDE 1000 MILLILITER(S): 9 INJECTION INTRAMUSCULAR; INTRAVENOUS; SUBCUTANEOUS at 18:54

## 2022-05-08 NOTE — ED PROVIDER NOTE - CLINICAL SUMMARY MEDICAL DECISION MAKING FREE TEXT BOX
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 VSWNL on presentation. On exam, no sign of infection or bleeding at trach site, gargling sound produced when inner tube is placed, thick mucus removed when suctioned. Lung sounds are clear to auscultation bilaterally. No abdominal tenderness. No peripheral edema. Will get labs, CXR to rule out penumonia. Will call respiratory to try to suction and reassess.

## 2022-05-08 NOTE — ED ADULT NURSE NOTE - CAS EDN DISCHARGE INTERVENTIONS
Problem: Ineffective Coping  Goal: Participates in unit activities  Interventions:  - Provide therapeutic environment   - Provide required programming   - Redirect inappropriate behaviors    Outcome: Progressing IV discontinued, cath removed intact

## 2022-05-08 NOTE — ED PROVIDER NOTE - NSICDXPASTMEDICALHX_GEN_ALL_CORE_FT
PAST MEDICAL HISTORY:  Cerebrovascular accident     Diabetes mellitus     DM (diabetes mellitus)     GERD (gastroesophageal reflux disease)     HTN (hypertension)     Hyperlipidemia     Hypertension     Moderate asthma with acute exacerbation, unspecified whether persistent

## 2022-05-08 NOTE — ED PROVIDER NOTE - PATIENT PORTAL LINK FT
You can access the FollowMyHealth Patient Portal offered by [facility] by registering at the following website: [email or web address]. By joining [employer]’s FollowMyHealth portal, you will also be able to view your health information using other applications (apps) compatible with our system.

## 2022-05-08 NOTE — ED PROVIDER NOTE - PHYSICAL EXAMINATION
Gen: Patient is well-appearing, NAD, follows command  HEENT: NCAT, normal conjunctiva, tongue midline, oral mucosa moist, no sign of infection or bleeding at trach site, gargling sound produced when inner tube is placed, thick mucus removed when suctioned.   Lung: CTAB, no respiratory distress, no wheezes/rhonchi/rales B/L  CV: RRR, no murmurs, rubs or gallops, distal pulses 2+ b/l  Abd: soft, NT, ND, no guarding, no rigidity, no rebound tenderness  MSK: no visible deformities, limited ROM of RUE  Neuro: No focal sensory or motor deficits  Skin: Warm, well perfused, no leg swelling  Psych: normal affect, calm

## 2022-05-08 NOTE — ED ADULT TRIAGE NOTE - CHIEF COMPLAINT QUOTE
family  states unable to place inner cannula to trach  x past 3 days- today unable to pass suction catheter. denies fevers, increased  secretions. trach replaced 2 wks ago size 4 ent clinic

## 2022-05-08 NOTE — ED ADULT NURSE NOTE - DOES PATIENT HAVE ADVANCE DIRECTIVE
From: Jovi Lo  To: Chin Hartmann MD  Sent: 1/19/2017 6:52 AM CST  Subject: Sildenafil    I saw on tv and the internet [facility] in [place] offers a generic medication with the same ingredients as Viagra called Sildenafil at a low cost is this something I can get a script for? is it FDA approved ?  
no
No

## 2022-05-08 NOTE — ED ADULT NURSE REASSESSMENT NOTE - NS ED NURSE REASSESS COMMENT FT1
Report received from dayshift RN. Patient nonverbal, awake, alert and smiling. No nonverbal indicators of pain at this time. Patient with chronic trach, suctioning provided. Vitals stable. Fluids running per orders. Stretcher in lowest position, wheels locked, appropriate side rails in place, call bell in reach.

## 2022-05-08 NOTE — ED PROVIDER NOTE - ATTENDING CONTRIBUTION TO CARE
89F w h/o CVA s/p intracranial hemorrhage, bedbound, nonverbal at baseline, HTN, DM, w/ tracheostomy presents w/ 3 days of difficulty suctioning as per aide at bedside. Patient's trach was replaced 2 weeks ago. Denies f/c, cough, sob, n/v, or mental status change from baseline     Except as documented in the HPI,  all other systems are negative    CONSTITUTIONAL: NAD, awake, alert  HEAD: Normocephalic; atraumatic  ENMT: External appears normal, MMM  NECK: no tenderness, FROM  CARD: Normal Sl, S2; no audible murmurs  RESP: normal wob, trach in place, sating well   ABD: soft, non-distended; non-tender  MSK: no edema, no cyanosis   SKIN: Warm, dry, no rashes  NEURO: awake, alert    89F w h/o CVA s/p intracranial hemorrhage, bedbound, nonverbal at baseline, HTN, DM, w/ tracheostomy presents w/ 3 days of difficulty suctioning, well appearing otherwise, vitals reassuring, will call respiratory for smaller tube, cxr r/o pna, labs

## 2022-05-08 NOTE — ED ADULT NURSE NOTE - OBJECTIVE STATEMENT
Pt received to RM 22: awake and alert, non verbal, PMH of head injury s/p trach, presents to ED with aide that states she is unable to advance the inner cannula (size 4) and states there is copious mucous. Trach was replaced 2 weeks ago. Pt was suctioned and provider at bedside was able to advance inner cannula with minimal discomfort, respiratory paged for consult. Respirations are even and unlabored, sating at 100% on RA, 22 G to L forearm placed, labs sent.

## 2022-05-08 NOTE — ED PROVIDER NOTE - NSFOLLOWUPINSTRUCTIONS_ED_ALL_ED_FT
You were seen for difficulty clearing the tracheostomy tube.     A presumptive diagnosis is made today, but further evaluation may be required by your primary care doctor and/or specialist for a definitive diagnosis. Therefore, follow up as directed and if symptoms change/worsen or any emergency conditions, please return to the ER.    If needed, call patient access services at [phone] to find a primary care doctor, or call at [phone] to make an appointment at the clinic.

## 2022-05-08 NOTE — ED PROVIDER NOTE - OBJECTIVE STATEMENT
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 Aide at bedside and provides history. Reports tracheostomy tube was replaced to a smaller size tube two weeks ago at Pulmonary clinic. Now tube size is ID 5.0, OD 9.4. Reports difficulty placing the inner tube, and gargling sounds when inner tube is in place. Denies fever, cough, presence of other acute symptoms. 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 Aide at bedside and provides history. Reports tracheostomy tube was replaced to a smaller size tube two weeks ago at Pulmonary clinic. Now tube size is ID 5.0, OD 9.4. Reports difficulty placing the inner tube, and gargling sounds when inner tube is in place. Denies fever, cough, presence of other acute symptoms. Family contact info: Erik (son) [phone], Mo (son) [phone].

## 2022-05-18 NOTE — ED ADULT TRIAGE NOTE - WEIGHT METHOD
Post Kidney and Pancreas Transplant Team Conference  Date: 5/18/2022  Transplant Coordinator: Yessica Becker     Attendees:  [x]  Dr. Pack [] Brandi Hernandez, EDWARD [x] Tamiko Cárdenas LPN     [x]  Dr. Motta [] Lizette Park RN [] Radhika Arnett LPN   [x]  Dr. Lee [x] Yessica Becker, EDWARD    [x]  Dr. Corona [x] Tasha Cartwright RN [] Bulmaro Weiner, PharmD   [] Dr. El [x] Hamida Guevara, EDWARD    [] Dr. Chavarria [x] Roderick Messer RN    [] Dr. Ocampo [x] Lynette De, EDWARD [] Nena Wheeler RN   [] Dr. De León [x] Naz Glaser RN    [x]  Dr. Aranda [] Radha Hurt RN    [] Dr. Wright [x] Ashely Tse RN    [] Sally Brito NP [x] Jovana Lemos RN        Verbal Plan Read Back:    Recommend cardiac testing (exercise stress test)  Closure kidney biopsy beginning of June 2022 before pancreas biopsy  If cr is 1.5 or higher complete biopsy right away    Routed to RN Coordinator   Tamiko Cárdenas LPN    Cardiac stress test ordered. Patient will notify RN[facility] if she does not hear about scheduling.   Gave patient phone number for scheduling for cardiac stress test. Order entered.     Scheduled for closure biopsy on 6/9. Arrival at 11am to [facility] Waiting room    Patient v/u to do COVID home test within 48 hours of procedure, and take a photo to present to IR.   Patient v/u that she needs a ride for procedure.   Patient v/u to be NPO for 6 hours prior to procedure.   
stated

## 2022-05-28 ENCOUNTER — EMERGENCY (EMERGENCY)
Facility: HOSPITAL | Age: 87
LOS: 1 days | Discharge: HOME | End: 2022-05-28
Attending: EMERGENCY MEDICINE | Admitting: EMERGENCY MEDICINE
Payer: MEDICARE

## 2022-05-28 VITALS
SYSTOLIC BLOOD PRESSURE: 130 MMHG | OXYGEN SATURATION: 96 % | DIASTOLIC BLOOD PRESSURE: 74 MMHG | HEART RATE: 82 BPM | RESPIRATION RATE: 20 BRPM | TEMPERATURE: 99 F

## 2022-05-28 VITALS
HEIGHT: 62 IN | RESPIRATION RATE: 22 BRPM | HEART RATE: 93 BPM | DIASTOLIC BLOOD PRESSURE: 67 MMHG | OXYGEN SATURATION: 95 % | TEMPERATURE: 98 F | SYSTOLIC BLOOD PRESSURE: 136 MMHG

## 2022-05-28 LAB
ALBUMIN SERPL ELPH-MCNC: 4.3 G/DL — SIGNIFICANT CHANGE UP (ref 3.3–5)
ALP SERPL-CCNC: 96 U/L — SIGNIFICANT CHANGE UP (ref 40–120)
ALT FLD-CCNC: 11 U/L — SIGNIFICANT CHANGE UP (ref 4–33)
ANION GAP SERPL CALC-SCNC: 17 MMOL/L — HIGH (ref 7–14)
AST SERPL-CCNC: 15 U/L — SIGNIFICANT CHANGE UP (ref 4–32)
BILIRUB SERPL-MCNC: 0.3 MG/DL — SIGNIFICANT CHANGE UP (ref 0.2–1.2)
BUN SERPL-MCNC: 27 MG/DL — HIGH (ref 7–23)
CALCIUM SERPL-MCNC: 9.2 MG/DL — SIGNIFICANT CHANGE UP (ref 8.4–10.5)
CHLORIDE SERPL-SCNC: 102 MMOL/L — SIGNIFICANT CHANGE UP (ref 98–107)
CO2 SERPL-SCNC: 24 MMOL/L — SIGNIFICANT CHANGE UP (ref 22–31)
CREAT SERPL-MCNC: 0.7 MG/DL — SIGNIFICANT CHANGE UP (ref 0.5–1.3)
EGFR: 80 ML/MIN/1.73M2 — SIGNIFICANT CHANGE UP
FLUAV AG NPH QL: SIGNIFICANT CHANGE UP
FLUBV AG NPH QL: SIGNIFICANT CHANGE UP
GLUCOSE SERPL-MCNC: 205 MG/DL — HIGH (ref 70–99)
HCT VFR BLD CALC: 39.1 % — SIGNIFICANT CHANGE UP (ref 34.5–45)
HGB BLD-MCNC: 12.2 G/DL — SIGNIFICANT CHANGE UP (ref 11.5–15.5)
MCHC RBC-ENTMCNC: 26.9 PG — LOW (ref 27–34)
MCHC RBC-ENTMCNC: 31.2 GM/DL — LOW (ref 32–36)
MCV RBC AUTO: 86.1 FL — SIGNIFICANT CHANGE UP (ref 80–100)
NRBC # BLD: 0 /100 WBCS — SIGNIFICANT CHANGE UP
NRBC # FLD: 0 K/UL — SIGNIFICANT CHANGE UP
PLATELET # BLD AUTO: 158 K/UL — SIGNIFICANT CHANGE UP (ref 150–400)
POTASSIUM SERPL-MCNC: 3.8 MMOL/L — SIGNIFICANT CHANGE UP (ref 3.5–5.3)
POTASSIUM SERPL-SCNC: 3.8 MMOL/L — SIGNIFICANT CHANGE UP (ref 3.5–5.3)
PROT SERPL-MCNC: 7.3 G/DL — SIGNIFICANT CHANGE UP (ref 6–8.3)
RBC # BLD: 4.54 M/UL — SIGNIFICANT CHANGE UP (ref 3.8–5.2)
RBC # FLD: 14.9 % — HIGH (ref 10.3–14.5)
RSV RNA NPH QL NAA+NON-PROBE: SIGNIFICANT CHANGE UP
SARS-COV-2 RNA SPEC QL NAA+PROBE: SIGNIFICANT CHANGE UP
SODIUM SERPL-SCNC: 143 MMOL/L — SIGNIFICANT CHANGE UP (ref 135–145)
WBC # BLD: 9.83 K/UL — SIGNIFICANT CHANGE UP (ref 3.8–10.5)
WBC # FLD AUTO: 9.83 K/UL — SIGNIFICANT CHANGE UP (ref 3.8–10.5)

## 2022-05-28 PROCEDURE — 99284 EMERGENCY DEPT VISIT MOD MDM: CPT

## 2022-05-28 PROCEDURE — 71045 X-RAY EXAM CHEST 1 VIEW: CPT | Mod: 26

## 2022-05-28 NOTE — ED PROVIDER NOTE - NSFOLLOWUPINSTRUCTIONS_ED_ALL_ED_FT
You were seen in the ED for increased tracheal secretions.   The following labs/imaging were obtained: see attached (if applicable)  Continue home medications (if any).   Return to the ED if you develop fever, change in mental status, shortness of breath, chest pain,  worsening or new concerning symptoms.  Follow up with your primary care in 2-3 days.   Follow up at the ENT clinic within 1 week.   Discussed with pt results of work up, strict return precautions, and need for follow up.  Pt expressed understanding and agrees with plan.

## 2022-05-28 NOTE — ED ADULT NURSE NOTE - ED STAT RN HANDOFF DETAILS
pt remained hemodynamically stable, trach suctioned multiple times, ;personal care assisted with, assisted pt to family car, no falls or injuries sustained.

## 2022-05-28 NOTE — ED ADULT NURSE NOTE - CAS EDN DISCHARGE ASSESSMENT
OB/GYN Resident Interval Note    Called and spoke to RN to see if patient is willing to be evaluated this morning. RN reports the patient stated \"quit opening my M-F-ing door and NO\" when asked if OB/GYN could see her this morning. Please page OB/GYN team with any further questions or concerns of if patient has any obstetrical complaints.      Pj Wright DO  Ob/Gyn Resident PGY2  [address] [place] [address]   3/16/2019 6:23 AM Alert and oriented to person, place and time

## 2022-05-28 NOTE — ED ADULT NURSE NOTE - OBJECTIVE STATEMENT
PT received from triage, accompanied by son. Patient is non verbal, per son, pt has no common way of communicating with patient; they just try to read her body language. pt is bed bound. Came home from NH about 5 weeks ago after being there for 2 years. pt has trach in place. 5 weeks ago, trach size was changed from 6Fr to 4Fr. Son has noticed worsening thickened secretions with limited success during suction. Son denies fevers. Skin intact. Pt does not appear to be in any distress. Crackles noted in all lung fields. Suctioned patient with minimal improvement. Left forearm 20g inserted. 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 Pt placed on tele, monitoring O2. Will continue to suction as needed.

## 2022-05-28 NOTE — ED PROVIDER NOTE - OBJECTIVE STATEMENT
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 No fever. No trach bleeding. Family has been frequently suctioning. No AMS. Acting at baseline.

## 2022-05-28 NOTE — ED ADULT NURSE NOTE - NSIMPLEMENTINTERV_GEN_ALL_ED
Implemented All Universal Safety Interventions:  [place] to call system. Call bell, personal items and telephone within reach. Instruct patient to call for assistance. Room bathroom lighting operational. Non-slip footwear when patient is off stretcher. Physically safe environment: no spills, clutter or unnecessary equipment. Stretcher in lowest position, wheels locked, appropriate side rails in place.

## 2022-05-28 NOTE — ED PROVIDER NOTE - ATTENDING CONTRIBUTION TO CARE
89 year old female with history of CVA with trach, htn, DB here with increased trach secretions, trach size was decreased months ago, Pt at baseline, but family has had to suction more. no fever or chills reported. On exam: rrr, lungs cta, pt in no distress, pt is non verbal. ENT consulted, labs and xray noted. Precautions reviewed.

## 2022-05-28 NOTE — ED PROVIDER NOTE - CLINICAL SUMMARY MEDICAL DECISION MAKING FREE TEXT BOX
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 Ddx tracheitis vs pneumonia. Labs, XR, suctioning.

## 2022-05-28 NOTE — ED PROVIDER NOTE - PROGRESS NOTE DETAILS
XR, labs and VS not consistent with tracheitis or pneumonia. ENT consulted. Recommends keeping the same trach size as the plan is for the patient to be decannulated in the future. HD stable for dc.

## 2022-05-28 NOTE — ED PROVIDER NOTE - PHYSICAL EXAMINATION
Gen: AAOx3, non-toxic  Head: NCAT  HEENT: EOMI, oral mucosa moist, normal conjunctiva  Lung: Trach with no bleeding; thick white secretions noted. Blt crackles. no respiratory distress. No wheezing.   CV: RRR, no murmurs, rubs or gallops  Abd: soft, NTND, no guarding, no CVA tenderness  MSK: no visible deformities  Neuro: No focal sensory or motor deficits, normal CN exam   Skin: Warm, well perfused, no rash  Psych: normal affect.

## 2022-05-28 NOTE — CONSULT NOTE ADULT - SUBJECTIVE AND OBJECTIVE BOX
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 No fever. No trach bleeding. Family has been frequently suctioning. No AMS. Acting at baseline.    ent consulted for trach mx. no sob or resop distress. no fevers.     Physical Exam  T(C): 36.6 (05-28-22 @ 18:41), Max: 36.6 (05-28-22 @ 14:25)  HR: 76 (05-28-22 @ 18:41) (75 - 93)  BP: 114/71 (05-28-22 @ 18:41) (112/64 - 136/67)  RR: 20 (05-28-22 @ 18:41) (20 - 22)  SpO2: 100% (05-28-22 @ 18:41) (95% - 100%)    General: NAD, A+Ox3  No respiratory distress, stridor, or stertor  4CFN in place soft velcro collar. HME in place  clear secretions seen from trach.     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 fiberoptic examination wnl.   - consider empiric abx if concern for pna, but cxr is clear, afebrile and no wbc, so suspicion is low  - routine f/up with Dr Peter  - discussed w attending  - no further ent intervention indicated at this time        --------------------------------------------------------------  Thank you for the consult,    Rebel Matthews MD  Resident  Department of Otolaryngology - Head and Neck Surgery  *AVAILABLE ON [employer] TEAMS*  Spectra #[id number]  Peds Page #[phone]  Adult Page #[phone]  ---------------------------------------------------------------

## 2022-05-28 NOTE — ED PROVIDER NOTE - NS ED ROS FT
GENERAL: No fever or chills  EYES: no change in vision  HEENT: see hpi  CARDIAC: no chest pain or palpiations   PULMONARY: see hpi  GI: no abdominal pain, nausea, vomiting, diarrhea, or constipation   : No changes in urination  SKIN: no rashes  NEURO: no headache, numbness, or weakness.  MSK: No joint pain

## 2022-05-28 NOTE — ED PROVIDER NOTE - PATIENT PORTAL LINK FT
You can access the FollowMyHealth Patient Portal offered by [facility] by registering at the following website: [email or web address]. By joining [facility]’s FollowMyHealth portal, you will also be able to view your health information using other applications (apps) compatible with our system.

## 2022-07-11 NOTE — ED ADULT NURSE NOTE - NS PRO PASSIVE SMOKE EXP
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 Per chart review, pt stated that he was in his usual state until 3-4 days ago when he visited a family member's house and had dietary indiscretions. Refers that since then he has had low appetite. Also statedtoday he started to notice a dark urine and increasing SOB so he decided to come to the ED. He denies palpitaitons, ICD shocks, but refers some chest tightness.      He also refers chills and soft stools for the past 2 days.     Pt to have LVAD exchanged on Wed.         No

## 2022-08-05 ENCOUNTER — APPOINTMENT (OUTPATIENT)
Dept: OTOLARYNGOLOGY | Facility: CLINIC | Age: 87
End: 2022-08-05

## 2022-08-05 PROCEDURE — 31622 DX BRONCHOSCOPE/WASH: CPT

## 2022-08-05 PROCEDURE — 99212 OFFICE O/P EST SF 10 MIN: CPT | Mod: 25

## 2022-08-08 NOTE — PROCEDURE
[FreeTextEntry1] : Trach change [FreeTextEntry3] : AFter positioning patient, the trach was changed to a Shiley 6.0 CFN. Secretions were suctioned. Patient tolerated well. Tracheoscopy confirmed placement.

## 2022-08-08 NOTE — PHYSICAL EXAM
[de-identified] : galen wallis 4 disposable inner cannula cuffless with moisture chamber [Nasal Endoscopy Performed] : nasal endoscopy was performed, see procedure section for findings [Midline] : trachea located in midline position [Normal] : no rashes

## 2022-08-08 NOTE — HISTORY OF PRESENT ILLNESS
[de-identified] : 89 year old female with tracheostomy secondary to respiratory failure.\par Presents today for Trach change. Last changed 04/27/22.\par Downsized at last visit from [facility] 6DCFN to 4DCFN.\par Now with noisy breathing and increase in secretions since downsizing.\par Unable to tolerate capping at this time. Was in ER because of mucous plugging.\par Son would like to go back to the larger sized trach. No current use of supplemental oxygen.

## 2022-08-08 NOTE — ASSESSMENT
[FreeTextEntry1] : 89 year old female with respiratory failure s/p trach. Was previously downsized to Shiley 4 but was unable to achieve adequate pulm toilet and had episode of plugging. Changed to Shiley 6 today.

## 2022-08-31 NOTE — ED PROVIDER NOTE - CONSTITUTIONAL DISTRESS
Intubation    Date/Time: 8/30/2022 6:43 PM  Performed by: Alex Alas CRNA  Authorized by: Juan Jeffery MD     Intubation:     Induction:  Rapid sequence induction    Intubated:  Postinduction    Mask Ventilation:  N/a    Attempts:  1    Attempted By:  CRNA    Method of Intubation:  Video laryngoscopy    Blade:  Bonilla 4    Laryngeal View Grade: Grade I - full view of cords      Difficult Airway Encountered?: No      Complications:  None    Airway Device:  Oral endotracheal tube    Airway Device Size:  8.0    Style/Cuff Inflation:  Cuffed    Inflation Amount (mL):  5    Secured at:  The lips    Placement Verified By:  Capnometry    Complicating Factors:  None    Findings Post-Intubation:  BS equal bilateral and atraumatic/condition of teeth unchanged     MILD

## 2022-10-15 ENCOUNTER — EMERGENCY (EMERGENCY)
Facility: HOSPITAL | Age: 87
LOS: 1 days | Discharge: HOME | End: 2022-10-15
Attending: STUDENT IN AN ORGANIZED HEALTH CARE EDUCATION/TRAINING PROGRAM | Admitting: STUDENT IN AN ORGANIZED HEALTH CARE EDUCATION/TRAINING PROGRAM

## 2022-10-15 VITALS
RESPIRATION RATE: 16 BRPM | DIASTOLIC BLOOD PRESSURE: 49 MMHG | TEMPERATURE: 98 F | HEART RATE: 71 BPM | OXYGEN SATURATION: 97 % | HEIGHT: 62 IN | SYSTOLIC BLOOD PRESSURE: 147 MMHG

## 2022-10-15 LAB
ALBUMIN SERPL ELPH-MCNC: 4.2 G/DL — SIGNIFICANT CHANGE UP (ref 3.3–5)
ALP SERPL-CCNC: 86 U/L — SIGNIFICANT CHANGE UP (ref 40–120)
ALT FLD-CCNC: 11 U/L — SIGNIFICANT CHANGE UP (ref 4–33)
ANION GAP SERPL CALC-SCNC: 14 MMOL/L — SIGNIFICANT CHANGE UP (ref 7–14)
AST SERPL-CCNC: 15 U/L — SIGNIFICANT CHANGE UP (ref 4–32)
B PERT DNA SPEC QL NAA+PROBE: SIGNIFICANT CHANGE UP
B PERT+PARAPERT DNA PNL SPEC NAA+PROBE: SIGNIFICANT CHANGE UP
BASE EXCESS BLDV CALC-SCNC: 2 MMOL/L — SIGNIFICANT CHANGE UP (ref -2–3)
BASE EXCESS BLDV CALC-SCNC: 2.9 MMOL/L — SIGNIFICANT CHANGE UP (ref -2–3)
BASOPHILS # BLD AUTO: 0.02 K/UL — SIGNIFICANT CHANGE UP (ref 0–0.2)
BASOPHILS NFR BLD AUTO: 0.3 % — SIGNIFICANT CHANGE UP (ref 0–2)
BILIRUB SERPL-MCNC: 0.2 MG/DL — SIGNIFICANT CHANGE UP (ref 0.2–1.2)
BLOOD GAS VENOUS COMPREHENSIVE RESULT: SIGNIFICANT CHANGE UP
BLOOD GAS VENOUS COMPREHENSIVE RESULT: SIGNIFICANT CHANGE UP
BORDETELLA PARAPERTUSSIS (RAPRVP): SIGNIFICANT CHANGE UP
BUN SERPL-MCNC: 27 MG/DL — HIGH (ref 7–23)
C PNEUM DNA SPEC QL NAA+PROBE: SIGNIFICANT CHANGE UP
CALCIUM SERPL-MCNC: 10.1 MG/DL — SIGNIFICANT CHANGE UP (ref 8.4–10.5)
CHLORIDE BLDV-SCNC: 102 MMOL/L — SIGNIFICANT CHANGE UP (ref 96–108)
CHLORIDE BLDV-SCNC: 103 MMOL/L — SIGNIFICANT CHANGE UP (ref 96–108)
CHLORIDE SERPL-SCNC: 103 MMOL/L — SIGNIFICANT CHANGE UP (ref 98–107)
CO2 BLDV-SCNC: 30.2 MMOL/L — HIGH (ref 22–26)
CO2 BLDV-SCNC: 30.4 MMOL/L — HIGH (ref 22–26)
CO2 SERPL-SCNC: 27 MMOL/L — SIGNIFICANT CHANGE UP (ref 22–31)
CREAT SERPL-MCNC: 0.94 MG/DL — SIGNIFICANT CHANGE UP (ref 0.5–1.3)
EGFR: 56 ML/MIN/1.73M2 — LOW
EOSINOPHIL # BLD AUTO: 0.06 K/UL — SIGNIFICANT CHANGE UP (ref 0–0.5)
EOSINOPHIL NFR BLD AUTO: 1 % — SIGNIFICANT CHANGE UP (ref 0–6)
FLUAV SUBTYP SPEC NAA+PROBE: SIGNIFICANT CHANGE UP
FLUBV RNA SPEC QL NAA+PROBE: SIGNIFICANT CHANGE UP
GAS PNL BLDV: 141 MMOL/L — SIGNIFICANT CHANGE UP (ref 136–145)
GAS PNL BLDV: 141 MMOL/L — SIGNIFICANT CHANGE UP (ref 136–145)
GLUCOSE BLDV-MCNC: 131 MG/DL — HIGH (ref 70–99)
GLUCOSE BLDV-MCNC: 132 MG/DL — HIGH (ref 70–99)
GLUCOSE SERPL-MCNC: 136 MG/DL — HIGH (ref 70–99)
HADV DNA SPEC QL NAA+PROBE: SIGNIFICANT CHANGE UP
HCO3 BLDV-SCNC: 29 MMOL/L — SIGNIFICANT CHANGE UP (ref 22–29)
HCO3 BLDV-SCNC: 29 MMOL/L — SIGNIFICANT CHANGE UP (ref 22–29)
HCOV 229E RNA SPEC QL NAA+PROBE: SIGNIFICANT CHANGE UP
HCOV HKU1 RNA SPEC QL NAA+PROBE: SIGNIFICANT CHANGE UP
HCOV NL63 RNA SPEC QL NAA+PROBE: SIGNIFICANT CHANGE UP
HCOV OC43 RNA SPEC QL NAA+PROBE: SIGNIFICANT CHANGE UP
HCT VFR BLD CALC: 38.7 % — SIGNIFICANT CHANGE UP (ref 34.5–45)
HCT VFR BLDA CALC: 34 % — LOW (ref 34.5–46.5)
HCT VFR BLDA CALC: 34 % — LOW (ref 34.5–46.5)
HGB BLD CALC-MCNC: 11.2 G/DL — LOW (ref 11.5–15.5)
HGB BLD CALC-MCNC: 11.3 G/DL — LOW (ref 11.5–15.5)
HGB BLD-MCNC: 11.7 G/DL — SIGNIFICANT CHANGE UP (ref 11.5–15.5)
HMPV RNA SPEC QL NAA+PROBE: SIGNIFICANT CHANGE UP
HPIV1 RNA SPEC QL NAA+PROBE: SIGNIFICANT CHANGE UP
HPIV2 RNA SPEC QL NAA+PROBE: SIGNIFICANT CHANGE UP
HPIV3 RNA SPEC QL NAA+PROBE: SIGNIFICANT CHANGE UP
HPIV4 RNA SPEC QL NAA+PROBE: SIGNIFICANT CHANGE UP
IANC: 3.79 K/UL — SIGNIFICANT CHANGE UP (ref 1.8–7.4)
IMM GRANULOCYTES NFR BLD AUTO: 0.5 % — SIGNIFICANT CHANGE UP (ref 0–0.9)
LACTATE BLDV-MCNC: 2.7 MMOL/L — HIGH (ref 0.5–2)
LACTATE BLDV-MCNC: 3.5 MMOL/L — HIGH (ref 0.5–2)
LYMPHOCYTES # BLD AUTO: 1.72 K/UL — SIGNIFICANT CHANGE UP (ref 1–3.3)
LYMPHOCYTES # BLD AUTO: 28.6 % — SIGNIFICANT CHANGE UP (ref 13–44)
M PNEUMO DNA SPEC QL NAA+PROBE: SIGNIFICANT CHANGE UP
MCHC RBC-ENTMCNC: 26.6 PG — LOW (ref 27–34)
MCHC RBC-ENTMCNC: 30.2 GM/DL — LOW (ref 32–36)
MCV RBC AUTO: 88 FL — SIGNIFICANT CHANGE UP (ref 80–100)
MONOCYTES # BLD AUTO: 0.39 K/UL — SIGNIFICANT CHANGE UP (ref 0–0.9)
MONOCYTES NFR BLD AUTO: 6.5 % — SIGNIFICANT CHANGE UP (ref 2–14)
NEUTROPHILS # BLD AUTO: 3.79 K/UL — SIGNIFICANT CHANGE UP (ref 1.8–7.4)
NEUTROPHILS NFR BLD AUTO: 63.1 % — SIGNIFICANT CHANGE UP (ref 43–77)
NRBC # BLD: 0 /100 WBCS — SIGNIFICANT CHANGE UP (ref 0–0)
NRBC # FLD: 0 K/UL — SIGNIFICANT CHANGE UP (ref 0–0)
PCO2 BLDV: 50 MMHG — HIGH (ref 39–42)
PCO2 BLDV: 53 MMHG — HIGH (ref 39–42)
PH BLDV: 7.34 — SIGNIFICANT CHANGE UP (ref 7.32–7.43)
PH BLDV: 7.37 — SIGNIFICANT CHANGE UP (ref 7.32–7.43)
PLATELET # BLD AUTO: 136 K/UL — LOW (ref 150–400)
PO2 BLDV: 20 MMHG — SIGNIFICANT CHANGE UP
PO2 BLDV: 32 MMHG — SIGNIFICANT CHANGE UP
POTASSIUM BLDV-SCNC: 3.9 MMOL/L — SIGNIFICANT CHANGE UP (ref 3.5–5.1)
POTASSIUM BLDV-SCNC: 4.3 MMOL/L — SIGNIFICANT CHANGE UP (ref 3.5–5.1)
POTASSIUM SERPL-MCNC: 4.6 MMOL/L — SIGNIFICANT CHANGE UP (ref 3.5–5.3)
POTASSIUM SERPL-SCNC: 4.6 MMOL/L — SIGNIFICANT CHANGE UP (ref 3.5–5.3)
PROT SERPL-MCNC: 7.4 G/DL — SIGNIFICANT CHANGE UP (ref 6–8.3)
RAPID RVP RESULT: SIGNIFICANT CHANGE UP
RBC # BLD: 4.4 M/UL — SIGNIFICANT CHANGE UP (ref 3.8–5.2)
RBC # FLD: 14 % — SIGNIFICANT CHANGE UP (ref 10.3–14.5)
RSV RNA SPEC QL NAA+PROBE: SIGNIFICANT CHANGE UP
RV+EV RNA SPEC QL NAA+PROBE: SIGNIFICANT CHANGE UP
SAO2 % BLDV: 24.5 % — SIGNIFICANT CHANGE UP
SAO2 % BLDV: 51.8 % — SIGNIFICANT CHANGE UP
SARS-COV-2 RNA SPEC QL NAA+PROBE: SIGNIFICANT CHANGE UP
SODIUM SERPL-SCNC: 144 MMOL/L — SIGNIFICANT CHANGE UP (ref 135–145)
TROPONIN T, HIGH SENSITIVITY RESULT: 29 NG/L — SIGNIFICANT CHANGE UP
TROPONIN T, HIGH SENSITIVITY RESULT: 30 NG/L — SIGNIFICANT CHANGE UP
WBC # BLD: 6.01 K/UL — SIGNIFICANT CHANGE UP (ref 3.8–10.5)
WBC # FLD AUTO: 6.01 K/UL — SIGNIFICANT CHANGE UP (ref 3.8–10.5)

## 2022-10-15 PROCEDURE — 71045 X-RAY EXAM CHEST 1 VIEW: CPT | Mod: 26

## 2022-10-15 PROCEDURE — 99285 EMERGENCY DEPT VISIT HI MDM: CPT

## 2022-10-15 RX ORDER — SODIUM CHLORIDE 9 MG/ML
1000 INJECTION, SOLUTION INTRAVENOUS ONCE
Refills: 0 | Status: COMPLETED | OUTPATIENT
Start: 2022-10-15 | End: 2022-10-15

## 2022-10-15 RX ADMIN — SODIUM CHLORIDE 1000 MILLILITER(S): 9 INJECTION, SOLUTION INTRAVENOUS at 20:33

## 2022-10-15 NOTE — ED PROVIDER NOTE - PHYSICAL EXAMINATION
Const: not in acute distress  Eyes: no conjunctival injection  HEENT: Head NCAT, Moist MM.  Neck: Trachea midline.   CVS: +S1/S2, Peripheral pulses 2+ and equal in all extremities.  RESP: Unlabored respiratory effort. Coarse breath sounds.   GI: Nontender/Nondistended, No CVA tenderness b/l.   MSK: Normocephalic/Atraumatic, No Lower Extremities edema b/l.   Skin: Intact.   Neuro: R sided deficits.  Psych: Alert and Awake. Const: not in acute distress  Eyes: no conjunctival injection  HEENT: Head NCAT, Moist MM.  Neck: Trachea midline.   CVS: +S1/S2, Peripheral pulses 2+ and equal in all extremities.  RESP: Increased work of breathing. Coarse breath sounds. Tachypneic.   GI: Nontender/Nondistended, No CVA tenderness b/l.   MSK: Normocephalic/Atraumatic, No Lower Extremities edema b/l.   Skin: Intact.   Neuro: R sided deficits.  Psych: Alert and Awake.

## 2022-10-15 NOTE — ED PROVIDER NOTE - ATTENDING CONTRIBUTION TO CARE
I have personally performed a face to face medical and diagnostic evaluation of the patient. I have discussed with and reviewed the Resident's note and agree with the History, ROS, Physical Exam and MDM unless otherwise indicated. A brief summary of my personal evaluation and impression can be found below.    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 Patient's  at bedside.  endorses patient needing to have increased suctioning at home.  also endorsing patient responding less (by shaking head yes/no) although not lethargic. Previously, patient was more responsive to 's questions, but not as consistent now. Denies fever, chills, n/v, CP, abdominal pain. Per son, plan is to decanulize but recently trach size was changed from 4 back to 6 as patient would commonly mucous plug.     On exam: Patient breathing comfortable, +audible upper airway congestion, no secretions from trach. Intermittent cough, satting 100% on RA. Patient able to respond yes/no to questions in room without difficulty. Lungs clear B/L. Will deep suction with RT, obtains labs, RVP, CXR to r/o viral vs bacterial etiiology such as pneumonia. Dispo pending lab results and reassessment.

## 2022-10-15 NOTE — ED ADULT NURSE REASSESSMENT NOTE - NS ED NURSE REASSESS COMMENT FT1
break coverage RN: pt suctioned, repeat blood work drawn and sent. pt resting comfortably. no new orders at this time.

## 2022-10-15 NOTE — ED PROVIDER NOTE - NSFOLLOWUPINSTRUCTIONS_ED_ALL_ED_FT
You were seen today in the emergency room for shortness of breath    Follow up with the ENT doctor in 2-3 days for further evaluation of the tracheostomy and symptoms     If you develop any new or worsening symptoms you need to return immediately to the emergency department. If you experience any of the following please come right back to the emergency room: chest pain that becomes much worse with walking up stairs or exercising, uncontrollable nausea and vomiting, severe chest pain that will not go away, passing out, new persistent numbness and/or weakness, increasing need for suctioning, or blue skin color (cyanosis)

## 2022-10-15 NOTE — ED PROVIDER NOTE - CLINICAL SUMMARY MEDICAL DECISION MAKING FREE TEXT BOX
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 The differential diagnosis includes but is not limited to increased secretion, URI vs pneumonia, mucus plugging, less likely pneumothorax, less likely cardiac in nature. Holding off on CT head due to no history of trauma recently. Will get CBC, CMP, VBG, CXR, and RVP.

## 2022-10-15 NOTE — ED ADULT NURSE NOTE - CHIEF COMPLAINT QUOTE
Pt with trach, c/o congestion, and  sob x 1 week. Pt's primary language English, non verbal due to trach as per family.  Pt with R side weakness

## 2022-10-15 NOTE — ED PROVIDER NOTE - PROGRESS NOTE DETAILS
Craig HATCH (PGY-3)  labs nonactionable and cxr appears grossly clear. pt is still well appearing. explained results of w/u to son. son is ok with bringing pt back to home. will d/c to em with return precautions. son will pick pt up from ED

## 2022-10-15 NOTE — ED ADULT TRIAGE NOTE - CHIEF COMPLAINT QUOTE
Pt with trach, c/o congestion, and  sob x 1 week. Pt's primary language English, non verbal due to trach as per family Pt with trach, c/o congestion, and  sob x 1 week. Pt's primary language English, non verbal due to trach as per family.  Pt with R side weakness

## 2022-10-15 NOTE — ED PROVIDER NOTE - NS ED ROS FT
CONST: no fevers  EYES: no blurry vision   ENT: no sore throat  CV: no chest pain, no extremity swelling  RESP: (+) shortness of breath, increased secretion  ABD: no abdominal pain, no nausea, no vomiting, no diarrhea, no melena  : no hematuria  MSK: no back pain  NEURO: no headache, no focal weakness, no dizziness  HEME: no bruising  SKIN: no rash

## 2022-10-15 NOTE — ED PROVIDER NOTE - OBJECTIVE STATEMENT
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 Patient's  at bedside.  endorses patient needing to have increased suctions.  also endorsing patient's increased confusion. Previously, patient was more responsive to 's questions, but not as consistent now. Denies fever, chills, n/v, CP, and abdominal pain.

## 2023-02-07 NOTE — ED ADULT NURSE NOTE - CADM POA CENTRAL LINE
Chief Complaint   Patient presents with   • UTI     Entered automatically based on patient selection in Patient Portal.       HISTORY OF PRESENT ILLNESS:    Melinda Diaz is a 68 year old female who is requesting an E-Visit for evaluation of possible UTI. Patient reports painful urination with urgency. Symptoms have been present for a few days. She is able to pass urine. There is a h/o UTI's, no alarm features present. Chart reviewed.    The patient's responses to the questions contained in the condition-specific questionnaire submission were reviewed.    MEDICATIONS  The medication list in the medical record as well as updates to the medication list submitted by the patient with this E-Visit were reviewed.      HISTORIES:  I have personally reviewed and updated the following electronic medical record sections: Current medications, Allergies, Problem list, Past Medical History, Past Surgical History, and Social History    ALLERGIES:   ALLERGIES:   Allergen Reactions   • Bactrim NAUSEA and PRURITUS   • Dander    • Mold   (Environmental) Other (See Comments)     Nasal congestion   • Oxycontin Nausea & Vomiting   • Vicodin [Hydrocodone-Acetaminophen] Nausea & Vomiting        ASSESSMENT:   Melinda was seen today for uti.    Diagnoses and all orders for this visit:    Dysuria  -     POST VIRTUAL VISIT TESTING; Future  -     URINALYSIS & REFLEX MICROSCOPY WITH CULTURE IF INDICATED; Future    -check UA, will treat if pos.  If neg, recommend in person check  -push fluids  -if taking any over the counter Azo, hold before submitting sample      ?11 minutes were spent reviewing the chart, electronic questionnaire, completing documentation, and referring/communicating with other health care professionals.      FOLLOW-UP:    Return if symptoms worsen or fail to improve.    PATIENT INSTRUCTIONS:   Attached in after visit summary.     The patient should follow up with primary physician or to go to the ER sooner if symptoms  get worse or if new symptoms not indicated through this asynchronous visit  appear.    The patient will address any questions about the diagnosis or plan of care by calling the nurse triage number listed in the visit summary.     CONSENT:  This visit is being performed virtually via asynchronous E-visit and written consent by the patient was submitted.     Clinical Location: [facility]  Melinda ZAKIYA Diaz location Home and is physically present in   the [facility] at the time of this visit.     GALILEA Andersen  2/7/2023  8:40 AM             No
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
## 2023-08-30 NOTE — PATIENT PROFILE ADULT - PATIENT REPRESENTATIVE: ( YOU CAN CHOOSE ANY PERSON THAT CAN ASSIST YOU WITH YOUR HEALTH CARE PREFERENCES, DOES NOT HAVE TO BE A SPOUSE, IMMEDIATE FAMILY OR SIGNIFICANT OTHER/PARTNER)
Problem: Discharge Planning  Goal: Discharge to home or other facility with appropriate resources  Outcome: Progressing     Problem: Wound:  Goal: Will show signs of wound healing; wound closure and no evidence of infection  Description: Will show signs of wound healing; wound closure and no evidence of infection  Outcome: Progressing     Problem: Venous:  Goal: Signs of wound healing will improve  Description: Signs of wound healing will improve  Outcome: Progressing     Problem: Compression therapy:  Goal: Will be free from complications associated with compression therapy  Description: Will be free from complications associated with compression therapy  Outcome: Progressing declines

## 2023-12-04 ENCOUNTER — APPOINTMENT (OUTPATIENT)
Dept: OTOLARYNGOLOGY | Facility: CLINIC | Age: 88
End: 2023-12-04
Payer: MEDICARE

## 2023-12-04 VITALS
HEART RATE: 89 BPM | BODY MASS INDEX: 30.73 KG/M2 | WEIGHT: 180 LBS | OXYGEN SATURATION: 96 % | HEIGHT: 64 IN | DIASTOLIC BLOOD PRESSURE: 85 MMHG | SYSTOLIC BLOOD PRESSURE: 132 MMHG

## 2023-12-04 DIAGNOSIS — Z87.09 PERSONAL HISTORY OF OTHER DISEASES OF THE RESPIRATORY SYSTEM: ICD-10-CM

## 2023-12-04 DIAGNOSIS — Z93.0 TRACHEOSTOMY STATUS: ICD-10-CM

## 2023-12-04 DIAGNOSIS — R49.1 APHONIA: ICD-10-CM

## 2023-12-04 DIAGNOSIS — J39.8 OTHER SPECIFIED DISEASES OF UPPER RESPIRATORY TRACT: ICD-10-CM

## 2023-12-04 PROCEDURE — 31575 DIAGNOSTIC LARYNGOSCOPY: CPT

## 2023-12-04 PROCEDURE — 99213 OFFICE O/P EST LOW 20 MIN: CPT | Mod: 25

## 2023-12-04 RX ORDER — VENLAFAXINE 37.5 MG/1
37.5 TABLET ORAL
Qty: 90 | Refills: 0 | Status: ACTIVE | COMMUNITY
Start: 2023-12-03

## 2023-12-04 RX ORDER — FUROSEMIDE 80 MG/1
TABLET ORAL
Refills: 0 | Status: COMPLETED | COMMUNITY
End: 2023-12-04

## 2023-12-04 RX ORDER — SULFAMETHOXAZOLE AND TRIMETHOPRIM 800; 160 MG/1; MG/1
800-160 TABLET ORAL
Qty: 14 | Refills: 0 | Status: COMPLETED | COMMUNITY
Start: 2023-06-14

## 2023-12-04 RX ORDER — SIMVASTATIN 80 MG/1
TABLET, FILM COATED ORAL
Refills: 0 | Status: ACTIVE | COMMUNITY

## 2023-12-04 RX ORDER — FUROSEMIDE 20 MG/1
20 TABLET ORAL
Qty: 90 | Refills: 0 | Status: ACTIVE | COMMUNITY
Start: 2023-08-18

## 2023-12-04 RX ORDER — CEPHALEXIN 500 MG/1
500 CAPSULE ORAL
Qty: 28 | Refills: 0 | Status: COMPLETED | COMMUNITY
Start: 2023-06-14

## 2024-02-26 NOTE — ED ADULT NURSE NOTE - ED STAT RN HAND OFF
Handoff [Mother] : mother [Grade:  _____] : Grade: [unfilled] [Dog] : dog [Smokers in Household] : there are smokers in the home [de-identified] : Mother

## 2025-03-04 NOTE — ED PROVIDER NOTE - PATIENT PORTAL LINK FT
Detail Level: Detailed Prescription Strength Graduated Compression Stockings Recommendations: The patient was counseled that prescription strength graduated compression stockings should be worn for all waking hours. They will follow up with a venous specialist to monitor graduated compression stocking usage and their symptoms. You can access the FollowMyHealth Patient Portal offered by [facility] by registering at the following website: [email or web address]. By joining [facility]’s FollowMyHealth portal, you will also be able to view your health information using other applications (apps) compatible with our system.

## 2025-03-11 NOTE — ED ADULT NURSE NOTE - NSSISCREENINGQ3_ED_A_ED
Problem: Prexisting or High Potential for Compromised Skin Integrity  Goal: Skin integrity is maintained or improved  Description: INTERVENTIONS:  - Identify patients at risk for skin breakdown  - Assess and monitor skin integrity  - Assess and monitor nutrition and hydration status  - Monitor labs   - Assess for incontinence   - Turn and reposition patient  - Assist with mobility/ambulation  - Relieve pressure over bony prominences  - Avoid friction and shearing  - Provide appropriate hygiene as needed including keeping skin clean and dry  - Evaluate need for skin moisturizer/barrier cream  - Collaborate with interdisciplinary team   - Patient/family teaching  - Consider wound care consult   Outcome: Progressing     Problem: Potential for Falls  Goal: Patient will remain free of falls  Description: INTERVENTIONS:  - Educate patient/family on patient safety including physical limitations  - Instruct patient to call for assistance with activity   - Consult OT/PT to assist with strengthening/mobility   - Keep Call bell within reach  - Keep bed low and locked with side rails adjusted as appropriate  - Keep care items and personal belongings within reach  - Initiate and maintain comfort rounds  - Make Fall Risk Sign visible to staff  - Apply yellow socks and bracelet for high fall risk patients  - Consider moving patient to room near nurses station  Outcome: Progressing      No